# Patient Record
Sex: MALE | Race: WHITE | NOT HISPANIC OR LATINO | Employment: OTHER | URBAN - METROPOLITAN AREA
[De-identification: names, ages, dates, MRNs, and addresses within clinical notes are randomized per-mention and may not be internally consistent; named-entity substitution may affect disease eponyms.]

---

## 2017-06-14 ENCOUNTER — OFFICE VISIT (OUTPATIENT)
Dept: URGENT CARE | Facility: PHYSICIAN GROUP | Age: 82
End: 2017-06-14
Payer: MEDICARE

## 2017-06-14 ENCOUNTER — HOSPITAL ENCOUNTER (OUTPATIENT)
Facility: MEDICAL CENTER | Age: 82
End: 2017-06-14
Attending: PHYSICIAN ASSISTANT
Payer: MEDICARE

## 2017-06-14 VITALS
SYSTOLIC BLOOD PRESSURE: 140 MMHG | DIASTOLIC BLOOD PRESSURE: 72 MMHG | TEMPERATURE: 98.4 F | OXYGEN SATURATION: 99 % | HEART RATE: 60 BPM

## 2017-06-14 DIAGNOSIS — R30.0 DYSURIA: ICD-10-CM

## 2017-06-14 LAB
APPEARANCE UR: CLEAR
BILIRUB UR STRIP-MCNC: NORMAL MG/DL
COLOR UR AUTO: YELLOW
GLUCOSE UR STRIP.AUTO-MCNC: NORMAL MG/DL
KETONES UR STRIP.AUTO-MCNC: NORMAL MG/DL
LEUKOCYTE ESTERASE UR QL STRIP.AUTO: NORMAL
NITRITE UR QL STRIP.AUTO: NORMAL
PH UR STRIP.AUTO: 5 [PH] (ref 5–8)
PROT UR QL STRIP: NORMAL MG/DL
RBC UR QL AUTO: NORMAL
SP GR UR STRIP.AUTO: 1.01
UROBILINOGEN UR STRIP-MCNC: NORMAL MG/DL

## 2017-06-14 PROCEDURE — 87086 URINE CULTURE/COLONY COUNT: CPT

## 2017-06-14 PROCEDURE — 81002 URINALYSIS NONAUTO W/O SCOPE: CPT | Performed by: PHYSICIAN ASSISTANT

## 2017-06-14 PROCEDURE — 99214 OFFICE O/P EST MOD 30 MIN: CPT | Performed by: PHYSICIAN ASSISTANT

## 2017-06-14 ASSESSMENT — ENCOUNTER SYMPTOMS
NAUSEA: 0
SWEATS: 0
CHILLS: 0
FLANK PAIN: 0

## 2017-06-14 NOTE — MR AVS SNAPSHOT
Shawanda Daley JrSheng   2017 9:45 AM   Office Visit   MRN: [id number]    Department:  [facility]   Dept Phone:  [phone]    Description:  Male : 1935   Provider:  Gilda Back PA-C           Reason for Visit     Other Possible UTI; x 1 month      Allergies as of 2017     No Known Allergies      You were diagnosed with     Dysuria   [788.1.ICD-9-CM]  -  Primary     Urinary frequency   [788.41.ICD-9-CM]         Vital Signs     Blood Pressure Pulse Temperature Oxygen Saturation Smoking Status       140/72 mmHg 60 36.9 °C (98.4 °F) 99% Never Smoker        Basic Information     Date Of Birth Sex Race Ethnicity Preferred Language    1935 Male White Non- English      Problem List              ICD-10-CM Priority Class Noted - Resolved    BPH (benign prostatic hyperplasia) N40.0   3/4/2016 - Present    History of kidney stones Z87.442   3/4/2016 - Present    IGT (impaired glucose tolerance) R73.02   9/15/2016 - Present    Lump or mass in breast N63   2016 - Present      Health Maintenance        Date Due Completion Dates    IMM DTaP/Tdap/Td Vaccine (1 - Tdap) 3/24/1955 ---    COLONOSCOPY 2023 (Done)    Override on 2013: Done            Results     POCT Urinalysis                   Current Immunizations     13-VALENT PCV PREVNAR 2014    Influenza Vaccine Adult HD 2015    Pneumococcal polysaccharide vaccine (PPSV-23) 9/15/2016    SHINGLES VACCINE 2011      Below and/or attached are the medications your provider expects you to take. Review all of your home medications and newly ordered medications with your provider and/or pharmacist. Follow medication instructions as directed by your provider and/or pharmacist. Please keep your medication list with you and share with your provider. Update the information when medications are discontinued, doses are changed, or new medications (including over-the-counter products) are added; and carry  medication information at all times in the event of emergency situations     Allergies:  No Known Allergies          Medications  Valid as of: June 14, 2017 - 10:36 AM    Generic Name Brand Name Tablet Size Instructions for use    .                 Medicines prescribed today were sent to:     [id number] - [place], [place] - [address]    [address] [place] [address]    Phone: [phone] Fax: [phone]    Open 24 Hours?: No      Medication refill instructions:       If your prescription bottle indicates you have medication refills left, it is not necessary to call your provider’s office. Please contact your pharmacy and they will refill your medication.    If your prescription bottle indicates you do not have any refills left, you may request refills at any time through one of the following ways: The online [employer] system (except Urgent Care), by calling your provider’s office, or by asking your pharmacy to contact your provider’s office with a refill request. Medication refills are processed only during regular business hours and may not be available until the next business day. Your provider may request additional information or to have a follow-up visit with you prior to refilling your medication.   *Please Note: Medication refills are assigned a new Rx number when refilled electronically. Your pharmacy may indicate that no refills were authorized even though a new prescription for the same medication is available at the pharmacy. Please request the medicine by name with the pharmacy before contacting your provider for a refill.        Your To Do List     Future Labs/Procedures Complete By Expires    Urine Culture  As directed 6/14/2018         [employer] Access Code: Activation code not generated  Current [employer] Status: Active

## 2017-06-14 NOTE — PROGRESS NOTES
Subjective:      Shawanda Daley Jr. is a 82 y.o. male who presents with Other    PMH:  has a past medical history of Renal disorder (2015) and Cataract. He also has no past medical history of Arthritis.  MEDS: No current outpatient prescriptions on file.  ALLERGIES: No Known Allergies  SURGHX:   Past Surgical History   Procedure Laterality Date   • Other  2000     anal cyst   • Inguinal hernia repair robotic Bilateral 8/10/2016     Procedure: INGUINAL HERNIA REPAIR ROBOTIC with mesh  ;  Surgeon: Francis Fletcher M.D.;  Location: SURGERY [facility];  Service:    • Other abdominal surgery  9/10/2016     hernia with mesh   • Breast biopsy Left 11/18/2016     Procedure: BREAST BIOPSY;  Surgeon: Adrianne Dobbins M.D.;  Location: SURGERY [facility];  Service:      SOCHX:  reports that he has never smoked. He has never used smokeless tobacco. He reports that he drinks alcohol. He reports that he does not use illicit drugs.  FH: family history includes Cancer in his maternal uncle; Heart Disease in his father; Other in his mother.          HPI Comments: Patient presents with:    PT states he has noticed occasional burning on initiation of urination when he gets up in the middle of the night to urinate, but otherwise no UTI symptoms.  PT states he noticed this first about a month ago, but then resolved. Has occurred more frequently in the past 1-2 weeks, but not with most urination.     PT denies fever, chills, back pain , abdominal pain, testicular pain, rash or discharge.       Dysuria   This is a new problem. The current episode started 1 to 4 weeks ago. The problem occurs intermittently. The problem has been waxing and waning. The quality of the pain is described as burning. The pain is mild. There has been no fever. He is sexually active. There is no history of pyelonephritis. Pertinent negatives include no chills, discharge, flank pain, frequency, hematuria, hesitancy, nausea, sweats or urgency. He has tried  nothing for the symptoms. His past medical history is significant for kidney stones. There is no history of recurrent UTIs.       Review of Systems   Constitutional: Negative for chills.   Gastrointestinal: Negative for nausea.   Genitourinary: Positive for dysuria. Negative for hesitancy, urgency, frequency, hematuria and flank pain.   All other systems reviewed and are negative.         Objective:     /72 mmHg  Pulse 60  Temp(Src) 36.9 °C (98.4 °F)  SpO2 99%     Physical Exam   Constitutional: He is oriented to person, place, and time. He appears well-developed and well-nourished. No distress.   HENT:   Head: Normocephalic and atraumatic.   Nose: Nose normal.   Mouth/Throat: Oropharynx is clear and moist.   Eyes: EOM are normal. Pupils are equal, round, and reactive to light.   Neck: Normal range of motion. Neck supple.   Cardiovascular: Normal rate, regular rhythm and normal heart sounds.    Pulmonary/Chest: Effort normal and breath sounds normal.   Abdominal: Soft. Bowel sounds are normal. There is no rebound and no guarding.   Genitourinary:   Declined exam   Musculoskeletal: Normal range of motion.   Neurological: He is alert and oriented to person, place, and time.   Skin: Skin is warm and dry.   Psychiatric: He has a normal mood and affect.   Nursing note and vitals reviewed.         UA: neg dip     Assessment/Plan:     1. Dysuria  POCT Urinalysis    Urine Culture        Urine culture sent, will wait to treat for culture results.     PT encouraged to increase fluid intake.     PT should follow up with PCP in 1-2 days for re-evaluation if symptoms have not improved.  Discussed red flags and reasons to return to UC or ED.  Pt and/or family verbalized understanding of diagnosis and follow up instructions and was given informational handout on diagnosis.  PT discharged.

## 2017-06-15 DIAGNOSIS — R30.0 DYSURIA: ICD-10-CM

## 2017-06-17 LAB
BACTERIA UR CULT: NORMAL
SIGNIFICANT IND 70042: NORMAL
SOURCE SOURCE: NORMAL

## 2018-02-20 ENCOUNTER — HOSPITAL ENCOUNTER (OUTPATIENT)
Dept: LAB | Facility: MEDICAL CENTER | Age: 83
End: 2018-02-20
Attending: UROLOGY
Payer: MEDICARE

## 2018-02-20 LAB — PSA SERPL-MCNC: 4.45 NG/ML (ref 0–4)

## 2018-02-20 PROCEDURE — 36415 COLL VENOUS BLD VENIPUNCTURE: CPT

## 2018-02-20 PROCEDURE — 84153 ASSAY OF PSA TOTAL: CPT

## 2018-03-01 ENCOUNTER — TELEPHONE (OUTPATIENT)
Dept: MEDICAL GROUP | Facility: MEDICAL CENTER | Age: 83
End: 2018-03-01

## 2018-03-01 NOTE — TELEPHONE ENCOUNTER
Future Appointments       Provider Department Center    3/2/2018 1:40 PM Luis Serrano M.D.; [place] [address] [address] [facility] ANNUAL WELLNESS VISIT PRE-VISIT PLANNING WITHOUT OUTREACH     1.  Reviewed note from last office visit with PCP: YES Last office visit 09/15/16    2.  If any orders were placed at last visit, do we have Results/Consult Notes?        •  Labs - Labs ordered, completed on 02/20/18 and results are in chart.       •  Imaging - Imaging ordered, completed and results are in chart. 11/03/16  BREAST/MAMMO       •  Referrals - No referrals were ordered at last office visit.     3.  Immunizations were updated in [place] using WebIZ?: Yes       •  WebIZ Recommendations: TDAP        •  Is patient due for Tdap? YES. Patient was not notified of copay/out of pocket cost.       •  Is patient due for Shingles? NO     4.  Patient has the following Care Path diagnoses on Problem List:  NONE    5.  MDX printed and highlighted for Provider? YES    6.  Patient is due for the following Health Maintenance Topics:     Health Maintenance Due   Topic Date Due   • IMM DTaP/Tdap/Td Vaccine (1 - Tdap) 03/24/1955   • IMM INFLUENZA (1) 09/01/2017   • Annual Wellness Visit  09/16/2017

## 2018-03-02 ENCOUNTER — OFFICE VISIT (OUTPATIENT)
Dept: MEDICAL GROUP | Facility: MEDICAL CENTER | Age: 83
End: 2018-03-02
Payer: MEDICARE

## 2018-03-02 VITALS
TEMPERATURE: 98.2 F | BODY MASS INDEX: 24.22 KG/M2 | HEIGHT: 71 IN | RESPIRATION RATE: 16 BRPM | HEART RATE: 53 BPM | OXYGEN SATURATION: 97 % | SYSTOLIC BLOOD PRESSURE: 130 MMHG | DIASTOLIC BLOOD PRESSURE: 62 MMHG | WEIGHT: 173 LBS

## 2018-03-02 DIAGNOSIS — R73.02 IGT (IMPAIRED GLUCOSE TOLERANCE): ICD-10-CM

## 2018-03-02 DIAGNOSIS — Z00.00 MEDICARE ANNUAL WELLNESS VISIT, SUBSEQUENT: ICD-10-CM

## 2018-03-02 DIAGNOSIS — N40.0 BENIGN PROSTATIC HYPERPLASIA, UNSPECIFIED WHETHER LOWER URINARY TRACT SYMPTOMS PRESENT: ICD-10-CM

## 2018-03-02 PROCEDURE — G0439 PPPS, SUBSEQ VISIT: HCPCS | Performed by: INTERNAL MEDICINE

## 2018-03-02 RX ORDER — TAMSULOSIN HYDROCHLORIDE 0.4 MG/1
CAPSULE ORAL
COMMUNITY
Start: 2018-02-05 | End: 2019-01-01

## 2018-03-02 ASSESSMENT — PATIENT HEALTH QUESTIONNAIRE - PHQ9: CLINICAL INTERPRETATION OF PHQ2 SCORE: 0

## 2018-03-02 ASSESSMENT — ACTIVITIES OF DAILY LIVING (ADL): BATHING_REQUIRES_ASSISTANCE: 0

## 2018-03-02 NOTE — PROGRESS NOTES
CC: Wellness examination    HPI:   Shawanda presents today with the following.  Elaina describes their overall health as good.    1. Medicare annual wellness visit, subsequent  Discussed healthy lifestyle habits as well as screening regimens. Information given on advance directives      Information for advance directives given to patient or instructed to bring in advance directives into to office to put in chart.      Depression Screening    Little interest or pleasure in doing things?  0 - not at all  Feeling down, depressed , or hopeless? 0 - not at all  Patient Health Questionnaire Score: 0     If depressive symptoms identified deferred to follow up visit unless specifically addressed in assessment and plan.    Interpretation of PHQ-9 Total Score   Score Severity   1-4 No Depression   5-9 Mild Depression   10-14 Moderate Depression   15-19 Moderately Severe Depression   20-27 Severe Depression    Screening for Cognitive Impairment    Three Minute Recall (apple, watch, slim)  3/3    Draw clock face with all 12 numbers set to the hand to show 10 minutes past 11 o'clock  1 4/5  Cognitive concerns identified deferred for follow up unless specifically addressed in assessment and plan.    Fall Risk Assessment    Has the patient had two or more falls in the last year or any fall with injury in the last year?  No    Safety Assessment    Throw rugs on floor.  Yes  Handrails on all stairs.  Yes  Good lighting in all hallways.  Yes  Difficulty hearing.  Yes  Patient counseled about all safety risks that were identified.    Functional Assessment ADLs    Are there any barriers preventing you from cooking for yourself or meeting nutritional needs?  No.    Are there any barriers preventing you from driving safely or obtaining transportation?  No.    Are there any barriers preventing you from using a telephone or calling for help?  No.    Are there any barriers preventing you from shopping?  No.    Are there any barriers preventing  you from taking care of your own finances?  No.    Are there any barriers preventing you from managing your medications?  No.    Are there any barriers preventing you from showering/bathing yourself?  No.    Are currently engaging any exercise or physical activity?  Yes.       Health Maintenance Summary                IMM DTaP/Tdap/Td Vaccine Postponed 3/19/2025 Originally [birth year]24/1954. Insurance/Financial    Annual Wellness Visit Next Due 3/3/2019      Done 3/2/2018 Visit Dx: Medicare annual wellness visit, subsequent     Patient has more history with this topic...    COLONOSCOPY Next Due 5/17/2023      Done 5/17/2013           Patient Care Team:  Lius Serrano M.D. as PCP - General (Internal Medicine)  Luis Mckeon M.D. as Consulting Physician (Urology)  Ryder Flynn M.D. as Consulting Physician (Ophthalmology)            Health Care Screening: Age-appropriate preventive services that Medicare covers were discussed today and ordered as indicated and agreed upon by the patient, and as recommended by USPTF and ACIP.     Patient Active Problem List    Diagnosis Date Noted   • IGT (impaired glucose tolerance) 09/15/2016   • BPH (benign prostatic hyperplasia) 03/04/2016   • History of kidney stones 03/04/2016       Current Outpatient Prescriptions   Medication Sig Dispense Refill   • tamsulosin (FLOMAX) 0.4 MG capsule        No current facility-administered medications for this visit.        Family History   Problem Relation Age of Onset   • Other Mother      old age   • Heart Disease Father    • Cancer Maternal Uncle      prostate       Social History     Social History   • Marital status:      Spouse name: N/A   • Number of children: N/A   • Years of education: N/A     Occupational History   • Not on file.     Social History Main Topics   • Smoking status: Never Smoker   • Smokeless tobacco: Never Used   • Alcohol use 0.0 oz/week      Comment: 1-2 glasses of wine per month   • Drug use: No   • Sexual  "activity: No     Other Topics Concern   • Not on file     Social History Narrative   • No narrative on file       Past Surgical History:   Procedure Laterality Date   • BREAST BIOPSY Left 11/18/2016    Procedure: BREAST BIOPSY;  Surgeon: Adrianne Dobbins M.D.;  Location: SURGERY [facility];  Service:    • OTHER ABDOMINAL SURGERY  9/10/2016    hernia with mesh   • INGUINAL HERNIA REPAIR ROBOTIC Bilateral 8/10/2016    Procedure: INGUINAL HERNIA REPAIR ROBOTIC with mesh  ;  Surgeon: Francis Fletcher M.D.;  Location: SURGERY [facility];  Service:    • OTHER  2000    anal cyst       Allergies as of 03/02/2018   • (No Known Allergies)        ROS: Denies Chest pain, SOB, LE edema.    /62   Pulse (!) 53   Temp 36.8 °C (98.2 °F)   Resp 16   Ht 1.791 m (5' 10.5\")   Wt 78.5 kg (173 lb)   SpO2 97%   BMI 24.47 kg/m²      Physical Exam:  Gen:         Alert and oriented, No apparent distress.  Neck:        No Lymphadenopathy or Bruits.  Lungs:     Clear to auscultation bilaterally  CV:          Regular rate and rhythm. No murmurs, rubs or gallops.  Abd:         Soft non tender, non distended. Normal active bowel sounds.  No  Hepatosplenomegaly, No pulsatile masses.                   Ext:          No clubbing, cyanosis, edema.      Assessment and Plan.   82 y.o. male with the following issues.    1. Medicare annual wellness visit, subsequent  Discussed healthy lifestyle habits as well as screening regimens.    - Annual Wellness Visit - Includes PPPS Subsequent ()    2. IGT (impaired glucose tolerance)  Discussion on the importance of more routine follow-up have sent for blood work follow-up for abnormalities. Will see back in 6 months to continue monitoring    - Annual Wellness Visit - Includes PPPS Subsequent ()  - COMP METABOLIC PANEL; Future  - LIPID PROFILE; Future  - HEMOGLOBIN A1C; Future    3. Benign prostatic hyperplasia, unspecified whether lower urinary tract symptoms present  Stable " followed by urology no change to medications.  - tamsulosin (FLOMAX) 0.4 MG capsule;   - Annual Wellness Visit - Includes PPPS Subsequent ()        Referrals offered: Community-based lifestyle interventions to reduce health risks and promote self-management and wellness, fall prevention, nutrition, physical activity, tobacco-use cessation, weight loss, and mental health services as per orders if indicated.    Discussion today about general wellness and lifestyle habits:    · Prevent falls and reduce trip hazards; Cautioned about securing or removing rugs.  · Have a working fire alarm and carbon monoxide detector;   · Engage in regular physical activity and social activities

## 2018-03-13 ENCOUNTER — HOSPITAL ENCOUNTER (OUTPATIENT)
Dept: LAB | Facility: MEDICAL CENTER | Age: 83
End: 2018-03-13
Attending: INTERNAL MEDICINE
Payer: MEDICARE

## 2018-03-13 DIAGNOSIS — R73.02 IGT (IMPAIRED GLUCOSE TOLERANCE): ICD-10-CM

## 2018-03-13 LAB
ALBUMIN SERPL BCP-MCNC: 4 G/DL (ref 3.2–4.9)
ALBUMIN/GLOB SERPL: 1.9 G/DL
ALP SERPL-CCNC: 51 U/L (ref 30–99)
ALT SERPL-CCNC: 10 U/L (ref 2–50)
ANION GAP SERPL CALC-SCNC: 6 MMOL/L (ref 0–11.9)
AST SERPL-CCNC: 11 U/L (ref 12–45)
BILIRUB SERPL-MCNC: 0.7 MG/DL (ref 0.1–1.5)
BUN SERPL-MCNC: 22 MG/DL (ref 8–22)
CALCIUM SERPL-MCNC: 8.9 MG/DL (ref 8.5–10.5)
CHLORIDE SERPL-SCNC: 109 MMOL/L (ref 96–112)
CHOLEST SERPL-MCNC: 131 MG/DL (ref 100–199)
CO2 SERPL-SCNC: 26 MMOL/L (ref 20–33)
CREAT SERPL-MCNC: 1.18 MG/DL (ref 0.5–1.4)
EST. AVERAGE GLUCOSE BLD GHB EST-MCNC: 117 MG/DL
GLOBULIN SER CALC-MCNC: 2.1 G/DL (ref 1.9–3.5)
GLUCOSE SERPL-MCNC: 107 MG/DL (ref 65–99)
HBA1C MFR BLD: 5.7 % (ref 0–5.6)
HDLC SERPL-MCNC: 37 MG/DL
LDLC SERPL CALC-MCNC: 76 MG/DL
POTASSIUM SERPL-SCNC: 4.4 MMOL/L (ref 3.6–5.5)
PROT SERPL-MCNC: 6.1 G/DL (ref 6–8.2)
SODIUM SERPL-SCNC: 141 MMOL/L (ref 135–145)
TRIGL SERPL-MCNC: 89 MG/DL (ref 0–149)

## 2018-03-13 PROCEDURE — 36415 COLL VENOUS BLD VENIPUNCTURE: CPT

## 2018-03-13 PROCEDURE — 80053 COMPREHEN METABOLIC PANEL: CPT

## 2018-03-13 PROCEDURE — 83036 HEMOGLOBIN GLYCOSYLATED A1C: CPT

## 2018-03-13 PROCEDURE — 80061 LIPID PANEL: CPT

## 2018-09-05 ENCOUNTER — OFFICE VISIT (OUTPATIENT)
Dept: MEDICAL GROUP | Facility: MEDICAL CENTER | Age: 83
End: 2018-09-05
Payer: MEDICARE

## 2018-09-05 VITALS
BODY MASS INDEX: 22.4 KG/M2 | TEMPERATURE: 97.8 F | HEIGHT: 71 IN | HEART RATE: 58 BPM | RESPIRATION RATE: 16 BRPM | OXYGEN SATURATION: 97 % | WEIGHT: 160 LBS | SYSTOLIC BLOOD PRESSURE: 126 MMHG | DIASTOLIC BLOOD PRESSURE: 62 MMHG

## 2018-09-05 DIAGNOSIS — R53.83 OTHER FATIGUE: ICD-10-CM

## 2018-09-05 DIAGNOSIS — Z11.3 SCREEN FOR STD (SEXUALLY TRANSMITTED DISEASE): ICD-10-CM

## 2018-09-05 DIAGNOSIS — R15.9 INCONTINENCE OF FECES, UNSPECIFIED FECAL INCONTINENCE TYPE: ICD-10-CM

## 2018-09-05 DIAGNOSIS — G62.9 NEUROPATHY: ICD-10-CM

## 2018-09-05 DIAGNOSIS — Z13.220 SCREENING, LIPID: ICD-10-CM

## 2018-09-05 DIAGNOSIS — R73.02 IGT (IMPAIRED GLUCOSE TOLERANCE): ICD-10-CM

## 2018-09-05 LAB
HBA1C MFR BLD: 5.2 % (ref ?–5.8)
INT CON NEG: NORMAL
INT CON POS: NORMAL

## 2018-09-05 PROCEDURE — 99214 OFFICE O/P EST MOD 30 MIN: CPT | Performed by: INTERNAL MEDICINE

## 2018-09-05 PROCEDURE — 83036 HEMOGLOBIN GLYCOSYLATED A1C: CPT | Performed by: INTERNAL MEDICINE

## 2018-09-05 NOTE — PROGRESS NOTES
"CC: Follow-up blood sugars complaining of foot numbness and rectal incontinence.    HPI:   Shawanda presents today with the following.    1. IGT (impaired glucose tolerance)  Presents with a slightly elevated blood sugar in the past A1c of 5.7 recheck today under 5.5.  No true history of diabetes.    2. Neuropathy (HCC)  In complaint today is some discomfort in his feet mostly at night.  Does not have any pain with ambulation he notices it more when he is asleep at night.  Does not prevent him from sleep laying and pain is not severe.  Denies any radiation and no back pain or other pain in his legs.  He is noted to cross his feet multiple ×1 the visit.    3. Incontinence of feces, unspecified fecal incontinence type  Is complaining of some rectal incontinence.  He states he had some kind of surgery several years ago and since that time he has had some issues.  Symptoms are getting worse with time.  He denies any pain but reports if he does not get to the restroom immediately he will have an accident.  Blood in stool or dark tarry stool.      Patient Active Problem List    Diagnosis Date Noted   • IGT (impaired glucose tolerance) 09/15/2016   • BPH (benign prostatic hyperplasia) 03/04/2016   • History of kidney stones 03/04/2016       Current Outpatient Prescriptions   Medication Sig Dispense Refill   • tamsulosin (FLOMAX) 0.4 MG capsule        No current facility-administered medications for this visit.          Allergies as of 09/05/2018   • (No Known Allergies)        ROS: Denies Chest pain, SOB, LE edema.    /62   Pulse (!) 58   Temp 36.6 °C (97.8 °F)   Resp 16   Ht 1.791 m (5' 10.5\")   Wt 72.6 kg (160 lb)   SpO2 97%   BMI 22.63 kg/m²     Physical Exam:  Gen:         Alert and oriented, No apparent distress.  Neck:        No Lymphadenopathy or Bruits.  Lungs:     Clear to auscultation bilaterally  CV:          Regular rate and rhythm. No murmurs, rubs or gallops.               Ext:          No clubbing, " cyanosis, edema.      Assessment and Plan.   83 y.o. male with the following issues.    1. IGT (impaired glucose tolerance)  Currently doing well no change to therapy.  - POCT  A1C  - HEMOGLOBIN A1C; Future    2. Neuropathy (HCC)  Descriptive of neuropathy however sensation is completely intact.  Have sent for blood work to evaluate.  The only other symptom he may relate is some mild fatigue  - VITAMIN B12; Future  - FOLATE; Future  - TSH; Future  - REFERRAL TO GASTROENTEROLOGY  - CBC WITH DIFFERENTIAL; Future    3. Incontinence of feces, unspecified fecal incontinence type  Given the persistence of symptoms likely related to prior surgeries have sent over to GI for evaluation.

## 2018-09-11 ENCOUNTER — HOSPITAL ENCOUNTER (OUTPATIENT)
Dept: LAB | Facility: MEDICAL CENTER | Age: 83
End: 2018-09-11
Attending: INTERNAL MEDICINE
Payer: MEDICARE

## 2018-09-11 DIAGNOSIS — Z13.220 SCREENING, LIPID: ICD-10-CM

## 2018-09-11 DIAGNOSIS — G62.9 NEUROPATHY: ICD-10-CM

## 2018-09-11 DIAGNOSIS — R53.83 OTHER FATIGUE: ICD-10-CM

## 2018-09-11 LAB
ALBUMIN SERPL BCP-MCNC: 3.7 G/DL (ref 3.2–4.9)
ALBUMIN/GLOB SERPL: 1.5 G/DL
ALP SERPL-CCNC: 47 U/L (ref 30–99)
ALT SERPL-CCNC: 10 U/L (ref 2–50)
ANION GAP SERPL CALC-SCNC: 7 MMOL/L (ref 0–11.9)
AST SERPL-CCNC: 12 U/L (ref 12–45)
BASOPHILS # BLD AUTO: 0.3 % (ref 0–1.8)
BASOPHILS # BLD: 0.02 K/UL (ref 0–0.12)
BILIRUB SERPL-MCNC: 0.5 MG/DL (ref 0.1–1.5)
BUN SERPL-MCNC: 30 MG/DL (ref 8–22)
CALCIUM SERPL-MCNC: 9.6 MG/DL (ref 8.5–10.5)
CHLORIDE SERPL-SCNC: 109 MMOL/L (ref 96–112)
CHOLEST SERPL-MCNC: 127 MG/DL (ref 100–199)
CO2 SERPL-SCNC: 26 MMOL/L (ref 20–33)
CREAT SERPL-MCNC: 1.29 MG/DL (ref 0.5–1.4)
EOSINOPHIL # BLD AUTO: 0.09 K/UL (ref 0–0.51)
EOSINOPHIL NFR BLD: 1.4 % (ref 0–6.9)
ERYTHROCYTE [DISTWIDTH] IN BLOOD BY AUTOMATED COUNT: 43.7 FL (ref 35.9–50)
FASTING STATUS PATIENT QL REPORTED: NORMAL
FOLATE SERPL-MCNC: 14.9 NG/ML
GLOBULIN SER CALC-MCNC: 2.4 G/DL (ref 1.9–3.5)
GLUCOSE SERPL-MCNC: 89 MG/DL (ref 65–99)
HCT VFR BLD AUTO: 41 % (ref 42–52)
HDLC SERPL-MCNC: 35 MG/DL
HGB BLD-MCNC: 13.4 G/DL (ref 14–18)
IMM GRANULOCYTES # BLD AUTO: 0.02 K/UL (ref 0–0.11)
IMM GRANULOCYTES NFR BLD AUTO: 0.3 % (ref 0–0.9)
LDLC SERPL CALC-MCNC: 75 MG/DL
LYMPHOCYTES # BLD AUTO: 2.16 K/UL (ref 1–4.8)
LYMPHOCYTES NFR BLD: 32.7 % (ref 22–41)
MCH RBC QN AUTO: 29.7 PG (ref 27–33)
MCHC RBC AUTO-ENTMCNC: 32.7 G/DL (ref 33.7–35.3)
MCV RBC AUTO: 90.9 FL (ref 81.4–97.8)
MONOCYTES # BLD AUTO: 0.51 K/UL (ref 0–0.85)
MONOCYTES NFR BLD AUTO: 7.7 % (ref 0–13.4)
NEUTROPHILS # BLD AUTO: 3.81 K/UL (ref 1.82–7.42)
NEUTROPHILS NFR BLD: 57.6 % (ref 44–72)
NRBC # BLD AUTO: 0 K/UL
NRBC BLD-RTO: 0 /100 WBC
PLATELET # BLD AUTO: 297 K/UL (ref 164–446)
PMV BLD AUTO: 9.3 FL (ref 9–12.9)
POTASSIUM SERPL-SCNC: 4.2 MMOL/L (ref 3.6–5.5)
PROT SERPL-MCNC: 6.1 G/DL (ref 6–8.2)
RBC # BLD AUTO: 4.51 M/UL (ref 4.7–6.1)
SODIUM SERPL-SCNC: 142 MMOL/L (ref 135–145)
TREPONEMA PALLIDUM IGG+IGM AB [PRESENCE] IN SERUM OR PLASMA BY IMMUNOASSAY: NON REACTIVE
TRIGL SERPL-MCNC: 86 MG/DL (ref 0–149)
TSH SERPL DL<=0.005 MIU/L-ACNC: 2.77 UIU/ML (ref 0.38–5.33)
VIT B12 SERPL-MCNC: 103 PG/ML (ref 211–911)
WBC # BLD AUTO: 6.6 K/UL (ref 4.8–10.8)

## 2018-09-11 PROCEDURE — 36415 COLL VENOUS BLD VENIPUNCTURE: CPT

## 2018-09-11 PROCEDURE — 84443 ASSAY THYROID STIM HORMONE: CPT

## 2018-09-11 PROCEDURE — 85025 COMPLETE CBC W/AUTO DIFF WBC: CPT

## 2018-09-11 PROCEDURE — 83036 HEMOGLOBIN GLYCOSYLATED A1C: CPT

## 2018-09-11 PROCEDURE — 80053 COMPREHEN METABOLIC PANEL: CPT

## 2018-09-11 PROCEDURE — 80061 LIPID PANEL: CPT

## 2018-09-11 PROCEDURE — 86780 TREPONEMA PALLIDUM: CPT

## 2018-09-11 PROCEDURE — 82607 VITAMIN B-12: CPT

## 2018-09-11 PROCEDURE — 82746 ASSAY OF FOLIC ACID SERUM: CPT

## 2018-09-12 LAB
EST. AVERAGE GLUCOSE BLD GHB EST-MCNC: 114 MG/DL
HBA1C MFR BLD: 5.6 % (ref 0–5.6)

## 2018-12-27 ENCOUNTER — PATIENT OUTREACH (OUTPATIENT)
Dept: HEALTH INFORMATION MANAGEMENT | Facility: OTHER | Age: 83
End: 2018-12-27

## 2018-12-27 NOTE — PROGRESS NOTES
1. Attempt #:1    2. HealthConnect Verified: yes    3. Verify PCP: yes    4. Review Care Team: yes    5. WebIZ Checked & Epic Updated: Yes  · WebIZ Recommendations: FLU  · Is patient due for Tdap? NO  · Is patient due for Shingles? NO    6. Reviewed/Updated the following with patient:       •   Communication Preference Obtained? YES       •   Preferred Pharmacy? YES       •   Preferred Lab? YES       •   Family History (document living status of immediate family members and if + hx of cancer, diabetes, hypertension, hyperlipidemia, heart attack, stroke) YES    7. Annual Wellness Visit Scheduling  · Scheduling Status:Scheduled     8. Care Gap Scheduling (Attempt to Schedule EACH Overdue Care Gap!)     Health Maintenance Due   Topic Date Due   • IMM INFLUENZA (1) 09/01/2018        Scheduled patient for Annual Wellness Visit     9. [employer] Activation: already active    10. [employer] Bee: no    11. Virtual Visits: no    12. Opt In to Text Messages: no    13. Patient was advised: “This is a free wellness visit. The provider will screen for medical conditions to help you stay healthy. If you have other concerns to address you may be asked to discuss these at a separate visit or there may be an additional fee.”     14. Patient was informed to arrive 15 min prior to their scheduled appointment and bring in their medication bottles.

## 2019-01-01 ENCOUNTER — NON-PROVIDER VISIT (OUTPATIENT)
Dept: MEDICAL GROUP | Facility: PHYSICIAN GROUP | Age: 84
End: 2019-01-01
Payer: MEDICARE

## 2019-01-01 ENCOUNTER — HOSPITAL ENCOUNTER (OUTPATIENT)
Dept: RADIOLOGY | Facility: MEDICAL CENTER | Age: 84
End: 2019-10-23
Attending: INTERNAL MEDICINE
Payer: MEDICARE

## 2019-01-01 ENCOUNTER — TELEPHONE (OUTPATIENT)
Dept: CARDIOLOGY | Facility: MEDICAL CENTER | Age: 84
End: 2019-01-01

## 2019-01-01 ENCOUNTER — OFFICE VISIT (OUTPATIENT)
Dept: MEDICAL GROUP | Facility: PHYSICIAN GROUP | Age: 84
End: 2019-01-01
Payer: MEDICARE

## 2019-01-01 ENCOUNTER — PATIENT OUTREACH (OUTPATIENT)
Dept: HEALTH INFORMATION MANAGEMENT | Facility: OTHER | Age: 84
End: 2019-01-01

## 2019-01-01 ENCOUNTER — HOSPITAL ENCOUNTER (OUTPATIENT)
Dept: RADIOLOGY | Facility: MEDICAL CENTER | Age: 84
End: 2019-07-16
Attending: INTERNAL MEDICINE
Payer: MEDICARE

## 2019-01-01 ENCOUNTER — HOSPITAL ENCOUNTER (OUTPATIENT)
Dept: LAB | Facility: MEDICAL CENTER | Age: 84
End: 2019-09-06
Attending: INTERNAL MEDICINE
Payer: MEDICARE

## 2019-01-01 ENCOUNTER — HOSPITAL ENCOUNTER (OUTPATIENT)
Dept: RADIOLOGY | Facility: MEDICAL CENTER | Age: 84
End: 2019-10-30
Attending: PHYSICIAN ASSISTANT
Payer: MEDICARE

## 2019-01-01 ENCOUNTER — HOSPITAL ENCOUNTER (OUTPATIENT)
Dept: LAB | Facility: MEDICAL CENTER | Age: 84
End: 2019-12-20
Attending: UROLOGY
Payer: MEDICARE

## 2019-01-01 ENCOUNTER — APPOINTMENT (OUTPATIENT)
Dept: RADIOLOGY | Facility: MEDICAL CENTER | Age: 84
End: 2019-01-01
Attending: INTERNAL MEDICINE
Payer: MEDICARE

## 2019-01-01 ENCOUNTER — HOSPITAL ENCOUNTER (OUTPATIENT)
Dept: RADIOLOGY | Facility: MEDICAL CENTER | Age: 84
End: 2019-12-31
Attending: FAMILY MEDICINE
Payer: MEDICARE

## 2019-01-01 ENCOUNTER — TELEPHONE (OUTPATIENT)
Dept: MEDICAL GROUP | Facility: PHYSICIAN GROUP | Age: 84
End: 2019-01-01

## 2019-01-01 ENCOUNTER — HOSPITAL ENCOUNTER (OUTPATIENT)
Dept: LAB | Facility: MEDICAL CENTER | Age: 84
End: 2019-12-13
Payer: MEDICARE

## 2019-01-01 ENCOUNTER — ANESTHESIA (OUTPATIENT)
Dept: SURGERY | Facility: MEDICAL CENTER | Age: 84
End: 2019-01-01
Payer: MEDICARE

## 2019-01-01 ENCOUNTER — OFFICE VISIT (OUTPATIENT)
Dept: NEUROLOGY | Facility: MEDICAL CENTER | Age: 84
End: 2019-01-01
Payer: MEDICARE

## 2019-01-01 ENCOUNTER — PATIENT MESSAGE (OUTPATIENT)
Dept: MEDICAL GROUP | Facility: PHYSICIAN GROUP | Age: 84
End: 2019-01-01

## 2019-01-01 ENCOUNTER — APPOINTMENT (OUTPATIENT)
Dept: RADIOLOGY | Facility: MEDICAL CENTER | Age: 84
End: 2019-01-01
Attending: EMERGENCY MEDICINE
Payer: MEDICARE

## 2019-01-01 ENCOUNTER — HOSPITAL ENCOUNTER (OUTPATIENT)
Facility: MEDICAL CENTER | Age: 84
End: 2019-12-28
Attending: EMERGENCY MEDICINE | Admitting: INTERNAL MEDICINE
Payer: MEDICARE

## 2019-01-01 ENCOUNTER — ANESTHESIA EVENT (OUTPATIENT)
Dept: SURGERY | Facility: MEDICAL CENTER | Age: 84
End: 2019-01-01
Payer: MEDICARE

## 2019-01-01 ENCOUNTER — OFFICE VISIT (OUTPATIENT)
Dept: CARDIOLOGY | Facility: MEDICAL CENTER | Age: 84
End: 2019-01-01
Payer: MEDICARE

## 2019-01-01 ENCOUNTER — HOSPITAL ENCOUNTER (EMERGENCY)
Facility: MEDICAL CENTER | Age: 84
End: 2019-08-08
Attending: EMERGENCY MEDICINE
Payer: MEDICARE

## 2019-01-01 ENCOUNTER — HOSPITAL ENCOUNTER (OUTPATIENT)
Dept: RADIOLOGY | Facility: MEDICAL CENTER | Age: 84
End: 2019-07-15
Attending: INTERNAL MEDICINE
Payer: MEDICARE

## 2019-01-01 ENCOUNTER — HOSPITAL ENCOUNTER (OUTPATIENT)
Facility: MEDICAL CENTER | Age: 84
End: 2019-10-24
Attending: INTERNAL MEDICINE | Admitting: INTERNAL MEDICINE
Payer: MEDICARE

## 2019-01-01 ENCOUNTER — HOSPITAL ENCOUNTER (OUTPATIENT)
Dept: LAB | Facility: MEDICAL CENTER | Age: 84
End: 2019-12-31
Attending: FAMILY MEDICINE
Payer: MEDICARE

## 2019-01-01 ENCOUNTER — HOSPITAL ENCOUNTER (OUTPATIENT)
Dept: CARDIOLOGY | Facility: MEDICAL CENTER | Age: 84
End: 2019-07-05
Attending: INTERNAL MEDICINE
Payer: MEDICARE

## 2019-01-01 ENCOUNTER — NON-PROVIDER VISIT (OUTPATIENT)
Dept: NEUROLOGY | Facility: MEDICAL CENTER | Age: 84
End: 2019-01-01
Payer: MEDICARE

## 2019-01-01 ENCOUNTER — HOSPITAL ENCOUNTER (OUTPATIENT)
Dept: LAB | Facility: MEDICAL CENTER | Age: 84
End: 2019-11-20
Attending: INTERNAL MEDICINE
Payer: MEDICARE

## 2019-01-01 ENCOUNTER — HOSPITAL ENCOUNTER (OUTPATIENT)
Dept: RADIOLOGY | Facility: MEDICAL CENTER | Age: 84
End: 2019-12-13
Payer: MEDICARE

## 2019-01-01 VITALS
HEART RATE: 52 BPM | TEMPERATURE: 96.8 F | SYSTOLIC BLOOD PRESSURE: 122 MMHG | HEIGHT: 71 IN | RESPIRATION RATE: 14 BRPM | WEIGHT: 151 LBS | OXYGEN SATURATION: 99 % | DIASTOLIC BLOOD PRESSURE: 74 MMHG | BODY MASS INDEX: 21.14 KG/M2

## 2019-01-01 VITALS
HEART RATE: 68 BPM | OXYGEN SATURATION: 92 % | RESPIRATION RATE: 18 BRPM | BODY MASS INDEX: 22.23 KG/M2 | SYSTOLIC BLOOD PRESSURE: 105 MMHG | HEIGHT: 71 IN | WEIGHT: 158.8 LBS | TEMPERATURE: 97.6 F | DIASTOLIC BLOOD PRESSURE: 62 MMHG

## 2019-01-01 VITALS
DIASTOLIC BLOOD PRESSURE: 79 MMHG | BODY MASS INDEX: 21.71 KG/M2 | RESPIRATION RATE: 18 BRPM | SYSTOLIC BLOOD PRESSURE: 128 MMHG | TEMPERATURE: 97.5 F | HEART RATE: 69 BPM | WEIGHT: 153.44 LBS | OXYGEN SATURATION: 100 %

## 2019-01-01 VITALS
HEIGHT: 71 IN | HEART RATE: 63 BPM | RESPIRATION RATE: 16 BRPM | TEMPERATURE: 97.9 F | SYSTOLIC BLOOD PRESSURE: 180 MMHG | OXYGEN SATURATION: 100 % | BODY MASS INDEX: 23.24 KG/M2 | DIASTOLIC BLOOD PRESSURE: 90 MMHG | WEIGHT: 166 LBS

## 2019-01-01 VITALS
HEART RATE: 48 BPM | OXYGEN SATURATION: 100 % | WEIGHT: 155 LBS | BODY MASS INDEX: 21.7 KG/M2 | SYSTOLIC BLOOD PRESSURE: 166 MMHG | RESPIRATION RATE: 16 BRPM | HEIGHT: 71 IN | DIASTOLIC BLOOD PRESSURE: 80 MMHG | TEMPERATURE: 97.6 F

## 2019-01-01 VITALS
SYSTOLIC BLOOD PRESSURE: 124 MMHG | OXYGEN SATURATION: 99 % | BODY MASS INDEX: 22.31 KG/M2 | RESPIRATION RATE: 16 BRPM | HEIGHT: 71 IN | TEMPERATURE: 98.2 F | WEIGHT: 159.39 LBS | DIASTOLIC BLOOD PRESSURE: 72 MMHG | HEART RATE: 54 BPM

## 2019-01-01 VITALS
TEMPERATURE: 96.9 F | HEART RATE: 48 BPM | WEIGHT: 152.4 LBS | OXYGEN SATURATION: 98 % | DIASTOLIC BLOOD PRESSURE: 70 MMHG | SYSTOLIC BLOOD PRESSURE: 120 MMHG | BODY MASS INDEX: 21.34 KG/M2 | HEIGHT: 71 IN | RESPIRATION RATE: 14 BRPM

## 2019-01-01 VITALS
HEART RATE: 48 BPM | OXYGEN SATURATION: 98 % | BODY MASS INDEX: 22.26 KG/M2 | SYSTOLIC BLOOD PRESSURE: 142 MMHG | WEIGHT: 159 LBS | DIASTOLIC BLOOD PRESSURE: 76 MMHG | HEIGHT: 71 IN

## 2019-01-01 VITALS
BODY MASS INDEX: 23.15 KG/M2 | DIASTOLIC BLOOD PRESSURE: 69 MMHG | OXYGEN SATURATION: 96 % | WEIGHT: 165.34 LBS | HEIGHT: 71 IN | RESPIRATION RATE: 20 BRPM | SYSTOLIC BLOOD PRESSURE: 131 MMHG | TEMPERATURE: 97.7 F | HEART RATE: 67 BPM

## 2019-01-01 VITALS
BODY MASS INDEX: 23.13 KG/M2 | TEMPERATURE: 98 F | HEART RATE: 53 BPM | OXYGEN SATURATION: 100 % | SYSTOLIC BLOOD PRESSURE: 140 MMHG | RESPIRATION RATE: 16 BRPM | DIASTOLIC BLOOD PRESSURE: 62 MMHG | HEIGHT: 71 IN | WEIGHT: 165.2 LBS

## 2019-01-01 VITALS
DIASTOLIC BLOOD PRESSURE: 78 MMHG | BODY MASS INDEX: 23.32 KG/M2 | HEIGHT: 71 IN | TEMPERATURE: 96.5 F | WEIGHT: 166.6 LBS | SYSTOLIC BLOOD PRESSURE: 126 MMHG | RESPIRATION RATE: 14 BRPM

## 2019-01-01 VITALS
WEIGHT: 153 LBS | SYSTOLIC BLOOD PRESSURE: 120 MMHG | HEIGHT: 71 IN | OXYGEN SATURATION: 99 % | BODY MASS INDEX: 21.42 KG/M2 | HEART RATE: 52 BPM | DIASTOLIC BLOOD PRESSURE: 68 MMHG

## 2019-01-01 VITALS
HEIGHT: 71 IN | DIASTOLIC BLOOD PRESSURE: 60 MMHG | BODY MASS INDEX: 22.82 KG/M2 | TEMPERATURE: 97.6 F | OXYGEN SATURATION: 92 % | WEIGHT: 163 LBS | RESPIRATION RATE: 16 BRPM | HEART RATE: 58 BPM | SYSTOLIC BLOOD PRESSURE: 128 MMHG

## 2019-01-01 VITALS
TEMPERATURE: 96.3 F | WEIGHT: 170 LBS | HEIGHT: 71 IN | OXYGEN SATURATION: 91 % | SYSTOLIC BLOOD PRESSURE: 112 MMHG | BODY MASS INDEX: 23.8 KG/M2 | RESPIRATION RATE: 16 BRPM | HEART RATE: 66 BPM | DIASTOLIC BLOOD PRESSURE: 70 MMHG

## 2019-01-01 DIAGNOSIS — R53.83 FATIGUE, UNSPECIFIED TYPE: ICD-10-CM

## 2019-01-01 DIAGNOSIS — E53.8 LOW SERUM VITAMIN B12: ICD-10-CM

## 2019-01-01 DIAGNOSIS — R73.02 IGT (IMPAIRED GLUCOSE TOLERANCE): ICD-10-CM

## 2019-01-01 DIAGNOSIS — R26.81 GAIT INSTABILITY: ICD-10-CM

## 2019-01-01 DIAGNOSIS — K46.9 ABDOMINAL HERNIA WITHOUT OBSTRUCTION AND WITHOUT GANGRENE, RECURRENCE NOT SPECIFIED, UNSPECIFIED HERNIA TYPE: ICD-10-CM

## 2019-01-01 DIAGNOSIS — R10.10 PAIN OF UPPER ABDOMEN: ICD-10-CM

## 2019-01-01 DIAGNOSIS — R93.1 ABNORMAL FINDINGS ON DIAGNOSTIC IMAGING OF HEART AND CORONARY CIRCULATION: ICD-10-CM

## 2019-01-01 DIAGNOSIS — M21.371 RIGHT FOOT DROP: ICD-10-CM

## 2019-01-01 DIAGNOSIS — R79.89 ELEVATED SERUM CREATININE: ICD-10-CM

## 2019-01-01 DIAGNOSIS — A04.8 H. PYLORI INFECTION: ICD-10-CM

## 2019-01-01 DIAGNOSIS — R10.33 PERIUMBILICAL ABDOMINAL PAIN: ICD-10-CM

## 2019-01-01 DIAGNOSIS — R14.0 ABDOMINAL BLOATING: ICD-10-CM

## 2019-01-01 DIAGNOSIS — R17 JAUNDICE: ICD-10-CM

## 2019-01-01 DIAGNOSIS — Z13.220 SCREENING FOR HYPERLIPIDEMIA: ICD-10-CM

## 2019-01-01 DIAGNOSIS — Z01.812 PRE-OPERATIVE LABORATORY EXAMINATION: ICD-10-CM

## 2019-01-01 DIAGNOSIS — I27.20 PULMONARY HYPERTENSION (HCC): ICD-10-CM

## 2019-01-01 DIAGNOSIS — N40.0 BENIGN PROSTATIC HYPERPLASIA, UNSPECIFIED WHETHER LOWER URINARY TRACT SYMPTOMS PRESENT: ICD-10-CM

## 2019-01-01 DIAGNOSIS — F32.A DEPRESSION, UNSPECIFIED DEPRESSION TYPE: ICD-10-CM

## 2019-01-01 DIAGNOSIS — K63.9 MURAL THICKENING OF COLON: ICD-10-CM

## 2019-01-01 DIAGNOSIS — R53.82 CHRONIC FATIGUE: ICD-10-CM

## 2019-01-01 DIAGNOSIS — R53.83 OTHER FATIGUE: ICD-10-CM

## 2019-01-01 DIAGNOSIS — G62.9 PERIPHERAL POLYNEUROPATHY: ICD-10-CM

## 2019-01-01 DIAGNOSIS — Z23 NEED FOR VACCINATION: ICD-10-CM

## 2019-01-01 DIAGNOSIS — E83.42 HYPOMAGNESEMIA: ICD-10-CM

## 2019-01-01 DIAGNOSIS — K92.1 MELENA: ICD-10-CM

## 2019-01-01 DIAGNOSIS — K31.89 GASTRIC NODULE: ICD-10-CM

## 2019-01-01 DIAGNOSIS — R60.1 ANASARCA: ICD-10-CM

## 2019-01-01 DIAGNOSIS — K59.1 FUNCTIONAL DIARRHEA: ICD-10-CM

## 2019-01-01 DIAGNOSIS — E53.8 VITAMIN B12 DEFICIENCY: ICD-10-CM

## 2019-01-01 DIAGNOSIS — R73.9 HYPERGLYCEMIA: ICD-10-CM

## 2019-01-01 DIAGNOSIS — Z13.29 SCREENING FOR ENDOCRINE DISORDER: ICD-10-CM

## 2019-01-01 DIAGNOSIS — R11.0 NAUSEA: ICD-10-CM

## 2019-01-01 DIAGNOSIS — R79.9 ELEVATED BUN: ICD-10-CM

## 2019-01-01 DIAGNOSIS — Z99.89 WALKER AS AMBULATION AID: ICD-10-CM

## 2019-01-01 DIAGNOSIS — N18.9 ACUTE KIDNEY INJURY SUPERIMPOSED ON CHRONIC KIDNEY DISEASE (HCC): ICD-10-CM

## 2019-01-01 DIAGNOSIS — Z91.81 AT HIGH RISK FOR FALLS: ICD-10-CM

## 2019-01-01 DIAGNOSIS — R63.4 WEIGHT LOSS: ICD-10-CM

## 2019-01-01 DIAGNOSIS — Z51.81 MEDICATION MONITORING ENCOUNTER: ICD-10-CM

## 2019-01-01 DIAGNOSIS — R20.2 TINGLING OF BOTH FEET: ICD-10-CM

## 2019-01-01 DIAGNOSIS — R79.89 ABNORMAL TSH: ICD-10-CM

## 2019-01-01 DIAGNOSIS — I35.0 MILD AORTIC STENOSIS BY PRIOR ECHOCARDIOGRAM: ICD-10-CM

## 2019-01-01 DIAGNOSIS — E55.9 VITAMIN D DEFICIENCY: ICD-10-CM

## 2019-01-01 DIAGNOSIS — E61.1 IRON DEFICIENCY: ICD-10-CM

## 2019-01-01 DIAGNOSIS — R41.89 COGNITIVE CHANGES: ICD-10-CM

## 2019-01-01 DIAGNOSIS — R63.4 UNINTENTIONAL WEIGHT LOSS: ICD-10-CM

## 2019-01-01 DIAGNOSIS — H61.23 BILATERAL IMPACTED CERUMEN: ICD-10-CM

## 2019-01-01 DIAGNOSIS — E87.20 METABOLIC ACIDOSIS: ICD-10-CM

## 2019-01-01 DIAGNOSIS — K43.9 SUPRAUMBILICAL HERNIA: ICD-10-CM

## 2019-01-01 DIAGNOSIS — N17.9 ACUTE KIDNEY INJURY SUPERIMPOSED ON CHRONIC KIDNEY DISEASE (HCC): ICD-10-CM

## 2019-01-01 DIAGNOSIS — Z11.3 ROUTINE SCREENING FOR STI (SEXUALLY TRANSMITTED INFECTION): ICD-10-CM

## 2019-01-01 DIAGNOSIS — R19.00 ABDOMINAL WALL BULGE: ICD-10-CM

## 2019-01-01 DIAGNOSIS — H91.8X3 OTHER SPECIFIED HEARING LOSS OF BOTH EARS: ICD-10-CM

## 2019-01-01 DIAGNOSIS — N17.9 AKI (ACUTE KIDNEY INJURY) (HCC): ICD-10-CM

## 2019-01-01 DIAGNOSIS — R29.898 WEAKNESS OF BOTH LEGS: ICD-10-CM

## 2019-01-01 DIAGNOSIS — R35.0 URINARY FREQUENCY: ICD-10-CM

## 2019-01-01 DIAGNOSIS — Z11.59 ENCOUNTER FOR HEPATITIS C SCREENING TEST FOR LOW RISK PATIENT: ICD-10-CM

## 2019-01-01 LAB
25(OH)D3 SERPL-MCNC: 27 NG/ML (ref 30–100)
ALBUMIN SERPL BCP-MCNC: 2.7 G/DL (ref 3.2–4.9)
ALBUMIN SERPL BCP-MCNC: 3.1 G/DL (ref 3.2–4.9)
ALBUMIN SERPL BCP-MCNC: 3.3 G/DL (ref 3.2–4.9)
ALBUMIN SERPL BCP-MCNC: 3.4 G/DL (ref 3.2–4.9)
ALBUMIN SERPL ELPH-MCNC: 2.88 G/DL (ref 3.75–5.01)
ALBUMIN/GLOB SERPL: 1.2 G/DL
ALBUMIN/GLOB SERPL: 1.3 G/DL
ALBUMIN/GLOB SERPL: 1.4 G/DL
ALBUMIN/GLOB SERPL: 1.4 G/DL
ALP SERPL-CCNC: 46 U/L (ref 30–99)
ALP SERPL-CCNC: 46 U/L (ref 30–99)
ALP SERPL-CCNC: 47 U/L (ref 30–99)
ALP SERPL-CCNC: 48 U/L (ref 30–99)
ALPHA1 GLOB SERPL ELPH-MCNC: 0.31 G/DL (ref 0.19–0.46)
ALPHA2 GLOB SERPL ELPH-MCNC: 0.61 G/DL (ref 0.48–1.05)
ALT SERPL-CCNC: 5 U/L (ref 2–50)
ALT SERPL-CCNC: 7 U/L (ref 2–50)
ALT SERPL-CCNC: <5 U/L (ref 2–50)
ALT SERPL-CCNC: <5 U/L (ref 2–50)
AMMONIA PLAS-SCNC: 23 UMOL/L (ref 11–45)
AMORPH CRY #/AREA URNS HPF: PRESENT /HPF
ANION GAP SERPL CALC-SCNC: 10 MMOL/L (ref 0–11.9)
ANION GAP SERPL CALC-SCNC: 11 MMOL/L (ref 0–11.9)
ANION GAP SERPL CALC-SCNC: 7 MMOL/L (ref 0–11.9)
ANION GAP SERPL CALC-SCNC: 7 MMOL/L (ref 0–11.9)
ANION GAP SERPL CALC-SCNC: 8 MMOL/L (ref 0–11.9)
APPEARANCE UR: ABNORMAL
APPEARANCE UR: NORMAL
APTT PPP: 40.2 SEC (ref 24.7–36)
AST SERPL-CCNC: 11 U/L (ref 12–45)
AST SERPL-CCNC: 6 U/L (ref 12–45)
AST SERPL-CCNC: 8 U/L (ref 12–45)
AST SERPL-CCNC: 9 U/L (ref 12–45)
B-GLOBULIN SERPL ELPH-MCNC: 0.63 G/DL (ref 0.48–1.1)
BACTERIA #/AREA URNS HPF: NEGATIVE /HPF
BASOPHILS # BLD AUTO: 0 % (ref 0–1.8)
BASOPHILS # BLD AUTO: 0.3 % (ref 0–1.8)
BASOPHILS # BLD AUTO: 0.4 % (ref 0–1.8)
BASOPHILS # BLD AUTO: 0.4 % (ref 0–1.8)
BASOPHILS # BLD AUTO: 0.5 % (ref 0–1.8)
BASOPHILS # BLD: 0 K/UL (ref 0–0.12)
BASOPHILS # BLD: 0.02 K/UL (ref 0–0.12)
BASOPHILS # BLD: 0.02 K/UL (ref 0–0.12)
BASOPHILS # BLD: 0.03 K/UL (ref 0–0.12)
BASOPHILS # BLD: 0.04 K/UL (ref 0–0.12)
BILIRUB SERPL-MCNC: 0.3 MG/DL (ref 0.1–1.5)
BILIRUB SERPL-MCNC: 0.3 MG/DL (ref 0.1–1.5)
BILIRUB SERPL-MCNC: 0.4 MG/DL (ref 0.1–1.5)
BILIRUB SERPL-MCNC: 0.5 MG/DL (ref 0.1–1.5)
BILIRUB UR QL STRIP.AUTO: NEGATIVE
BILIRUB UR STRIP-MCNC: NORMAL MG/DL
BUN SERPL-MCNC: 44 MG/DL (ref 8–22)
BUN SERPL-MCNC: 46 MG/DL (ref 8–22)
BUN SERPL-MCNC: 46 MG/DL (ref 8–22)
BUN SERPL-MCNC: 62 MG/DL (ref 8–22)
BUN SERPL-MCNC: 64 MG/DL (ref 8–22)
BUN SERPL-MCNC: 65 MG/DL (ref 8–22)
BUN SERPL-MCNC: 69 MG/DL (ref 8–22)
CALCIUM SERPL-MCNC: 7.6 MG/DL (ref 8.5–10.5)
CALCIUM SERPL-MCNC: 7.8 MG/DL (ref 8.5–10.5)
CALCIUM SERPL-MCNC: 7.9 MG/DL (ref 8.5–10.5)
CALCIUM SERPL-MCNC: 8 MG/DL (ref 8.5–10.5)
CALCIUM SERPL-MCNC: 8.3 MG/DL (ref 8.5–10.5)
CALCIUM SERPL-MCNC: 8.5 MG/DL (ref 8.5–10.5)
CALCIUM SERPL-MCNC: 8.5 MG/DL (ref 8.5–10.5)
CHLORIDE SERPL-SCNC: 110 MMOL/L (ref 96–112)
CHLORIDE SERPL-SCNC: 110 MMOL/L (ref 96–112)
CHLORIDE SERPL-SCNC: 111 MMOL/L (ref 96–112)
CHLORIDE SERPL-SCNC: 112 MMOL/L (ref 96–112)
CHLORIDE SERPL-SCNC: 112 MMOL/L (ref 96–112)
CHLORIDE SERPL-SCNC: 113 MMOL/L (ref 96–112)
CHLORIDE SERPL-SCNC: 114 MMOL/L (ref 96–112)
CHOLEST SERPL-MCNC: 94 MG/DL (ref 100–199)
CO2 SERPL-SCNC: 10 MMOL/L (ref 20–33)
CO2 SERPL-SCNC: 12 MMOL/L (ref 20–33)
CO2 SERPL-SCNC: 13 MMOL/L (ref 20–33)
CO2 SERPL-SCNC: 13 MMOL/L (ref 20–33)
CO2 SERPL-SCNC: 20 MMOL/L (ref 20–33)
CO2 SERPL-SCNC: 21 MMOL/L (ref 20–33)
CO2 SERPL-SCNC: 21 MMOL/L (ref 20–33)
COLOR UR AUTO: NORMAL
COLOR UR: YELLOW
CREAT SERPL-MCNC: 1.79 MG/DL (ref 0.5–1.4)
CREAT SERPL-MCNC: 2.09 MG/DL (ref 0.5–1.4)
CREAT SERPL-MCNC: 2.28 MG/DL (ref 0.5–1.4)
CREAT SERPL-MCNC: 3.2 MG/DL (ref 0.5–1.4)
CREAT SERPL-MCNC: 3.25 MG/DL (ref 0.5–1.4)
CREAT SERPL-MCNC: 3.26 MG/DL (ref 0.5–1.4)
CREAT SERPL-MCNC: 3.38 MG/DL (ref 0.5–1.4)
EOSINOPHIL # BLD AUTO: 0 K/UL (ref 0–0.51)
EOSINOPHIL # BLD AUTO: 0.04 K/UL (ref 0–0.51)
EOSINOPHIL # BLD AUTO: 0.04 K/UL (ref 0–0.51)
EOSINOPHIL # BLD AUTO: 0.07 K/UL (ref 0–0.51)
EOSINOPHIL # BLD AUTO: 0.1 K/UL (ref 0–0.51)
EOSINOPHIL NFR BLD: 0 % (ref 0–6.9)
EOSINOPHIL NFR BLD: 0.5 % (ref 0–6.9)
EOSINOPHIL NFR BLD: 0.7 % (ref 0–6.9)
EOSINOPHIL NFR BLD: 1 % (ref 0–6.9)
EOSINOPHIL NFR BLD: 1.8 % (ref 0–6.9)
EPI CELLS #/AREA URNS HPF: NEGATIVE /HPF
ERYTHROCYTE [DISTWIDTH] IN BLOOD BY AUTOMATED COUNT: 45.6 FL (ref 35.9–50)
ERYTHROCYTE [DISTWIDTH] IN BLOOD BY AUTOMATED COUNT: 47.9 FL (ref 35.9–50)
ERYTHROCYTE [DISTWIDTH] IN BLOOD BY AUTOMATED COUNT: 48.3 FL (ref 35.9–50)
ERYTHROCYTE [DISTWIDTH] IN BLOOD BY AUTOMATED COUNT: 49.1 FL (ref 35.9–50)
ERYTHROCYTE [DISTWIDTH] IN BLOOD BY AUTOMATED COUNT: 49.9 FL (ref 35.9–50)
ERYTHROCYTE [DISTWIDTH] IN BLOOD BY AUTOMATED COUNT: 50.5 FL (ref 35.9–50)
FASTING STATUS PATIENT QL REPORTED: NORMAL
FOLATE SERPL-MCNC: 6.2 NG/ML
GAMMA GLOB SERPL ELPH-MCNC: 0.86 G/DL (ref 0.62–1.51)
GGT SERPL-CCNC: 7 U/L (ref 7–51)
GLOBULIN SER CALC-MCNC: 2.3 G/DL (ref 1.9–3.5)
GLOBULIN SER CALC-MCNC: 2.4 G/DL (ref 1.9–3.5)
GLUCOSE SERPL-MCNC: 106 MG/DL (ref 65–99)
GLUCOSE SERPL-MCNC: 142 MG/DL (ref 65–99)
GLUCOSE SERPL-MCNC: 148 MG/DL (ref 65–99)
GLUCOSE SERPL-MCNC: 81 MG/DL (ref 65–99)
GLUCOSE SERPL-MCNC: 94 MG/DL (ref 65–99)
GLUCOSE SERPL-MCNC: 94 MG/DL (ref 65–99)
GLUCOSE SERPL-MCNC: 98 MG/DL (ref 65–99)
GLUCOSE UR STRIP.AUTO-MCNC: NEGATIVE MG/DL
GLUCOSE UR STRIP.AUTO-MCNC: NORMAL MG/DL
HCT VFR BLD AUTO: 36 % (ref 42–52)
HCT VFR BLD AUTO: 38.2 % (ref 42–52)
HCT VFR BLD AUTO: 38.4 % (ref 42–52)
HCT VFR BLD AUTO: 38.5 % (ref 42–52)
HCT VFR BLD AUTO: 40.4 % (ref 42–52)
HCT VFR BLD AUTO: 42.3 % (ref 42–52)
HDLC SERPL-MCNC: 31 MG/DL
HGB BLD-MCNC: 11.1 G/DL (ref 14–18)
HGB BLD-MCNC: 11.8 G/DL (ref 14–18)
HGB BLD-MCNC: 11.9 G/DL (ref 14–18)
HGB BLD-MCNC: 12 G/DL (ref 14–18)
HGB BLD-MCNC: 12.9 G/DL (ref 14–18)
HGB BLD-MCNC: 13.1 G/DL (ref 14–18)
HYALINE CASTS #/AREA URNS LPF: ABNORMAL /LPF
IF BLOCK AB SER QL RIA: NEGATIVE
IGA SERPL-MCNC: 269 MG/DL (ref 68–408)
IGG SERPL-MCNC: 936 MG/DL (ref 768–1632)
IGM SERPL-MCNC: 96 MG/DL (ref 35–263)
IMM GRANULOCYTES # BLD AUTO: 0.01 K/UL (ref 0–0.11)
IMM GRANULOCYTES # BLD AUTO: 0.02 K/UL (ref 0–0.11)
IMM GRANULOCYTES # BLD AUTO: 0.03 K/UL (ref 0–0.11)
IMM GRANULOCYTES NFR BLD AUTO: 0.2 % (ref 0–0.9)
IMM GRANULOCYTES NFR BLD AUTO: 0.3 % (ref 0–0.9)
IMM GRANULOCYTES NFR BLD AUTO: 0.4 % (ref 0–0.9)
IMM GRANULOCYTES NFR BLD AUTO: 0.4 % (ref 0–0.9)
IMM GRANULOCYTES NFR BLD AUTO: 0.5 % (ref 0–0.9)
INR PPP: 1.25 (ref 0.87–1.13)
INTERPRETATION SERPL IFE-IMP: ABNORMAL
INTERPRETATION SERPL IFE-IMP: ABNORMAL
KETONES UR STRIP.AUTO-MCNC: NEGATIVE MG/DL
KETONES UR STRIP.AUTO-MCNC: NORMAL MG/DL
LDLC SERPL CALC-MCNC: 46 MG/DL
LEUKOCYTE ESTERASE UR QL STRIP.AUTO: ABNORMAL
LEUKOCYTE ESTERASE UR QL STRIP.AUTO: NORMAL
LIPASE SERPL-CCNC: 14 U/L (ref 11–82)
LV EJECT FRACT  99904: 65
LV EJECT FRACT MOD 2C 99903: 63.13
LV EJECT FRACT MOD 4C 99902: 58.46
LV EJECT FRACT MOD BP 99901: 60.97
LYMPHOCYTES # BLD AUTO: 0.92 K/UL (ref 1–4.8)
LYMPHOCYTES # BLD AUTO: 1.65 K/UL (ref 1–4.8)
LYMPHOCYTES # BLD AUTO: 1.86 K/UL (ref 1–4.8)
LYMPHOCYTES # BLD AUTO: 2.01 K/UL (ref 1–4.8)
LYMPHOCYTES # BLD AUTO: 2.28 K/UL (ref 1–4.8)
LYMPHOCYTES NFR BLD: 19.5 % (ref 22–41)
LYMPHOCYTES NFR BLD: 21.1 % (ref 22–41)
LYMPHOCYTES NFR BLD: 31 % (ref 22–41)
LYMPHOCYTES NFR BLD: 32.3 % (ref 22–41)
LYMPHOCYTES NFR BLD: 35.6 % (ref 22–41)
MAGNESIUM SERPL-MCNC: 2.1 MG/DL (ref 1.5–2.5)
MCH RBC QN AUTO: 28.4 PG (ref 27–33)
MCH RBC QN AUTO: 28.5 PG (ref 27–33)
MCH RBC QN AUTO: 28.6 PG (ref 27–33)
MCH RBC QN AUTO: 28.8 PG (ref 27–33)
MCH RBC QN AUTO: 29.1 PG (ref 27–33)
MCH RBC QN AUTO: 29.3 PG (ref 27–33)
MCHC RBC AUTO-ENTMCNC: 30.6 G/DL (ref 33.7–35.3)
MCHC RBC AUTO-ENTMCNC: 30.8 G/DL (ref 33.7–35.3)
MCHC RBC AUTO-ENTMCNC: 31 G/DL (ref 33.7–35.3)
MCHC RBC AUTO-ENTMCNC: 31.2 G/DL (ref 33.7–35.3)
MCHC RBC AUTO-ENTMCNC: 31.3 G/DL (ref 33.7–35.3)
MCHC RBC AUTO-ENTMCNC: 31.9 G/DL (ref 33.7–35.3)
MCV RBC AUTO: 91.2 FL (ref 81.4–97.8)
MCV RBC AUTO: 91.6 FL (ref 81.4–97.8)
MCV RBC AUTO: 91.6 FL (ref 81.4–97.8)
MCV RBC AUTO: 93.4 FL (ref 81.4–97.8)
MCV RBC AUTO: 93.5 FL (ref 81.4–97.8)
MCV RBC AUTO: 93.7 FL (ref 81.4–97.8)
METHYLMALONATE SERPL-SCNC: 0.16 UMOL/L (ref 0–0.4)
MICRO URNS: ABNORMAL
MONOCYTES # BLD AUTO: 0.09 K/UL (ref 0–0.85)
MONOCYTES # BLD AUTO: 0.44 K/UL (ref 0–0.85)
MONOCYTES # BLD AUTO: 0.49 K/UL (ref 0–0.85)
MONOCYTES # BLD AUTO: 0.64 K/UL (ref 0–0.85)
MONOCYTES # BLD AUTO: 0.77 K/UL (ref 0–0.85)
MONOCYTES NFR BLD AUTO: 10.5 % (ref 0–13.4)
MONOCYTES NFR BLD AUTO: 2.1 % (ref 0–13.4)
MONOCYTES NFR BLD AUTO: 7.6 % (ref 0–13.4)
MONOCYTES NFR BLD AUTO: 7.6 % (ref 0–13.4)
MONOCYTES NFR BLD AUTO: 8.7 % (ref 0–13.4)
NEUTROPHILS # BLD AUTO: 2.99 K/UL (ref 1.82–7.42)
NEUTROPHILS # BLD AUTO: 3.35 K/UL (ref 1.82–7.42)
NEUTROPHILS # BLD AUTO: 3.38 K/UL (ref 1.82–7.42)
NEUTROPHILS # BLD AUTO: 4.17 K/UL (ref 1.82–7.42)
NEUTROPHILS # BLD AUTO: 6.08 K/UL (ref 1.82–7.42)
NEUTROPHILS NFR BLD: 53 % (ref 44–72)
NEUTROPHILS NFR BLD: 56.6 % (ref 44–72)
NEUTROPHILS NFR BLD: 58.8 % (ref 44–72)
NEUTROPHILS NFR BLD: 71.6 % (ref 44–72)
NEUTROPHILS NFR BLD: 76.6 % (ref 44–72)
NITRITE UR QL STRIP.AUTO: NEGATIVE
NITRITE UR QL STRIP.AUTO: NORMAL
NRBC # BLD AUTO: 0 K/UL
NRBC BLD-RTO: 0 /100 WBC
PATHOLOGY CONSULT NOTE: NORMAL
PATHOLOGY STUDY: ABNORMAL
PH UR STRIP.AUTO: 5 [PH] (ref 5–8)
PH UR STRIP.AUTO: 5 [PH] (ref 5–8)
PLATELET # BLD AUTO: 119 K/UL (ref 164–446)
PLATELET # BLD AUTO: 158 K/UL (ref 164–446)
PLATELET # BLD AUTO: 162 K/UL (ref 164–446)
PLATELET # BLD AUTO: 257 K/UL (ref 164–446)
PLATELET # BLD AUTO: 270 K/UL (ref 164–446)
PLATELET # BLD AUTO: 312 K/UL (ref 164–446)
PMV BLD AUTO: 10.1 FL (ref 9–12.9)
PMV BLD AUTO: 10.2 FL (ref 9–12.9)
PMV BLD AUTO: 10.6 FL (ref 9–12.9)
PMV BLD AUTO: 9.1 FL (ref 9–12.9)
PMV BLD AUTO: 9.5 FL (ref 9–12.9)
PMV BLD AUTO: 9.7 FL (ref 9–12.9)
POTASSIUM SERPL-SCNC: 4.4 MMOL/L (ref 3.6–5.5)
POTASSIUM SERPL-SCNC: 4.8 MMOL/L (ref 3.6–5.5)
POTASSIUM SERPL-SCNC: 4.9 MMOL/L (ref 3.6–5.5)
POTASSIUM SERPL-SCNC: 5 MMOL/L (ref 3.6–5.5)
POTASSIUM SERPL-SCNC: 5.3 MMOL/L (ref 3.6–5.5)
PROCALCITONIN SERPL-MCNC: 0.43 NG/ML
PROT SERPL-MCNC: 5 G/DL (ref 6–8.2)
PROT SERPL-MCNC: 5.3 G/DL (ref 6.3–8.2)
PROT SERPL-MCNC: 5.5 G/DL (ref 6–8.2)
PROT SERPL-MCNC: 5.7 G/DL (ref 6–8.2)
PROT SERPL-MCNC: 5.8 G/DL (ref 6–8.2)
PROT UR QL STRIP: 100 MG/DL
PROT UR QL STRIP: 30 MG/DL
PROTHROMBIN TIME: 16 SEC (ref 12–14.6)
RBC # BLD AUTO: 3.85 M/UL (ref 4.7–6.1)
RBC # BLD AUTO: 4.1 M/UL (ref 4.7–6.1)
RBC # BLD AUTO: 4.12 M/UL (ref 4.7–6.1)
RBC # BLD AUTO: 4.17 M/UL (ref 4.7–6.1)
RBC # BLD AUTO: 4.43 M/UL (ref 4.7–6.1)
RBC # BLD AUTO: 4.62 M/UL (ref 4.7–6.1)
RBC # URNS HPF: ABNORMAL /HPF
RBC UR QL AUTO: NEGATIVE
RBC UR QL AUTO: NORMAL
SODIUM SERPL-SCNC: 134 MMOL/L (ref 135–145)
SODIUM SERPL-SCNC: 135 MMOL/L (ref 135–145)
SODIUM SERPL-SCNC: 136 MMOL/L (ref 135–145)
SODIUM SERPL-SCNC: 136 MMOL/L (ref 135–145)
SODIUM SERPL-SCNC: 138 MMOL/L (ref 135–145)
SODIUM SERPL-SCNC: 138 MMOL/L (ref 135–145)
SODIUM SERPL-SCNC: 140 MMOL/L (ref 135–145)
SP GR UR STRIP.AUTO: 1.02
SP GR UR STRIP.AUTO: >1.03
T4 FREE SERPL-MCNC: 0.62 NG/DL (ref 0.53–1.43)
T4 FREE SERPL-MCNC: 0.72 NG/DL (ref 0.53–1.43)
TRIGL SERPL-MCNC: 83 MG/DL (ref 0–149)
TSH SERPL DL<=0.005 MIU/L-ACNC: 5.69 UIU/ML (ref 0.38–5.33)
TSH SERPL DL<=0.005 MIU/L-ACNC: 7.12 UIU/ML (ref 0.38–5.33)
UROBILINOGEN UR STRIP-MCNC: 0.2 MG/DL
UROBILINOGEN UR STRIP.AUTO-MCNC: 0.2 MG/DL
VIT B12 SERPL-MCNC: 218 PG/ML (ref 211–911)
VIT B12 SERPL-MCNC: >1500 PG/ML (ref 211–911)
WBC # BLD AUTO: 4.4 K/UL (ref 4.8–10.8)
WBC # BLD AUTO: 5.6 K/UL (ref 4.8–10.8)
WBC # BLD AUTO: 5.8 K/UL (ref 4.8–10.8)
WBC # BLD AUTO: 7.4 K/UL (ref 4.8–10.8)
WBC # BLD AUTO: 8.5 K/UL (ref 4.8–10.8)
WBC # BLD AUTO: 9.1 K/UL (ref 4.8–10.8)
WBC #/AREA URNS HPF: ABNORMAL /HPF

## 2019-01-01 PROCEDURE — 80053 COMPREHEN METABOLIC PANEL: CPT

## 2019-01-01 PROCEDURE — 93306 TTE W/DOPPLER COMPLETE: CPT

## 2019-01-01 PROCEDURE — 36415 COLL VENOUS BLD VENIPUNCTURE: CPT

## 2019-01-01 PROCEDURE — 84443 ASSAY THYROID STIM HORMONE: CPT

## 2019-01-01 PROCEDURE — 99220 PR INITIAL OBSERVATION CARE,LEVL III: CPT | Performed by: INTERNAL MEDICINE

## 2019-01-01 PROCEDURE — 700105 HCHG RX REV CODE 258: Performed by: NURSE PRACTITIONER

## 2019-01-01 PROCEDURE — 96375 TX/PRO/DX INJ NEW DRUG ADDON: CPT

## 2019-01-01 PROCEDURE — 97161 PT EVAL LOW COMPLEX 20 MIN: CPT

## 2019-01-01 PROCEDURE — 82784 ASSAY IGA/IGD/IGG/IGM EACH: CPT

## 2019-01-01 PROCEDURE — 99214 OFFICE O/P EST MOD 30 MIN: CPT | Performed by: FAMILY MEDICINE

## 2019-01-01 PROCEDURE — 71045 X-RAY EXAM CHEST 1 VIEW: CPT

## 2019-01-01 PROCEDURE — 700105 HCHG RX REV CODE 258: Performed by: INTERNAL MEDICINE

## 2019-01-01 PROCEDURE — 99225 PR SUBSEQUENT OBSERVATION CARE,LEVEL II: CPT | Performed by: INTERNAL MEDICINE

## 2019-01-01 PROCEDURE — 80048 BASIC METABOLIC PNL TOTAL CA: CPT

## 2019-01-01 PROCEDURE — A9270 NON-COVERED ITEM OR SERVICE: HCPCS | Performed by: INTERNAL MEDICINE

## 2019-01-01 PROCEDURE — 72148 MRI LUMBAR SPINE W/O DYE: CPT

## 2019-01-01 PROCEDURE — 78452 HT MUSCLE IMAGE SPECT MULT: CPT | Mod: 26 | Performed by: INTERNAL MEDICINE

## 2019-01-01 PROCEDURE — G0008 ADMIN INFLUENZA VIRUS VAC: HCPCS | Performed by: INTERNAL MEDICINE

## 2019-01-01 PROCEDURE — 160203 HCHG ENDO MINUTES - 1ST 30 MINS LEVEL 4: Performed by: INTERNAL MEDICINE

## 2019-01-01 PROCEDURE — G0378 HOSPITAL OBSERVATION PER HR: HCPCS

## 2019-01-01 PROCEDURE — 99214 OFFICE O/P EST MOD 30 MIN: CPT | Performed by: INTERNAL MEDICINE

## 2019-01-01 PROCEDURE — 82977 ASSAY OF GGT: CPT

## 2019-01-01 PROCEDURE — 70450 CT HEAD/BRAIN W/O DYE: CPT

## 2019-01-01 PROCEDURE — 99217 PR OBSERVATION CARE DISCHARGE: CPT | Performed by: INTERNAL MEDICINE

## 2019-01-01 PROCEDURE — 160036 HCHG PACU - EA ADDL 30 MINS PHASE I: Performed by: INTERNAL MEDICINE

## 2019-01-01 PROCEDURE — 85025 COMPLETE CBC W/AUTO DIFF WBC: CPT

## 2019-01-01 PROCEDURE — 160046 HCHG PACU - 1ST 60 MINS PHASE II: Performed by: INTERNAL MEDICINE

## 2019-01-01 PROCEDURE — 82607 VITAMIN B-12: CPT

## 2019-01-01 PROCEDURE — 700111 HCHG RX REV CODE 636 W/ 250 OVERRIDE (IP): Performed by: INTERNAL MEDICINE

## 2019-01-01 PROCEDURE — 700111 HCHG RX REV CODE 636 W/ 250 OVERRIDE (IP): Performed by: ANESTHESIOLOGY

## 2019-01-01 PROCEDURE — 85610 PROTHROMBIN TIME: CPT

## 2019-01-01 PROCEDURE — 70551 MRI BRAIN STEM W/O DYE: CPT

## 2019-01-01 PROCEDURE — 160002 HCHG RECOVERY MINUTES (STAT): Performed by: INTERNAL MEDICINE

## 2019-01-01 PROCEDURE — 96374 THER/PROPH/DIAG INJ IV PUSH: CPT

## 2019-01-01 PROCEDURE — 81002 URINALYSIS NONAUTO W/O SCOPE: CPT | Performed by: INTERNAL MEDICINE

## 2019-01-01 PROCEDURE — 99203 OFFICE O/P NEW LOW 30 MIN: CPT

## 2019-01-01 PROCEDURE — 99214 OFFICE O/P EST MOD 30 MIN: CPT | Mod: 25 | Performed by: INTERNAL MEDICINE

## 2019-01-01 PROCEDURE — 83735 ASSAY OF MAGNESIUM: CPT

## 2019-01-01 PROCEDURE — 700111 HCHG RX REV CODE 636 W/ 250 OVERRIDE (IP): Performed by: EMERGENCY MEDICINE

## 2019-01-01 PROCEDURE — 82306 VITAMIN D 25 HYDROXY: CPT

## 2019-01-01 PROCEDURE — 83690 ASSAY OF LIPASE: CPT

## 2019-01-01 PROCEDURE — 93018 CV STRESS TEST I&R ONLY: CPT | Performed by: INTERNAL MEDICINE

## 2019-01-01 PROCEDURE — 93000 ELECTROCARDIOGRAM COMPLETE: CPT | Performed by: INTERNAL MEDICINE

## 2019-01-01 PROCEDURE — 500066 HCHG BITE BLOCK, ECT: Performed by: INTERNAL MEDICINE

## 2019-01-01 PROCEDURE — 700102 HCHG RX REV CODE 250 W/ 637 OVERRIDE(OP): Performed by: INTERNAL MEDICINE

## 2019-01-01 PROCEDURE — 76700 US EXAM ABDOM COMPLETE: CPT

## 2019-01-01 PROCEDURE — 96372 THER/PROPH/DIAG INJ SC/IM: CPT | Performed by: INTERNAL MEDICINE

## 2019-01-01 PROCEDURE — G0439 PPPS, SUBSEQ VISIT: HCPCS | Performed by: INTERNAL MEDICINE

## 2019-01-01 PROCEDURE — 700101 HCHG RX REV CODE 250: Performed by: ANESTHESIOLOGY

## 2019-01-01 PROCEDURE — 700102 HCHG RX REV CODE 250 W/ 637 OVERRIDE(OP): Performed by: NURSE PRACTITIONER

## 2019-01-01 PROCEDURE — 71250 CT THORAX DX C-: CPT

## 2019-01-01 PROCEDURE — 71046 X-RAY EXAM CHEST 2 VIEWS: CPT

## 2019-01-01 PROCEDURE — 83921 ORGANIC ACID SINGLE QUANT: CPT

## 2019-01-01 PROCEDURE — 84165 PROTEIN E-PHORESIS SERUM: CPT

## 2019-01-01 PROCEDURE — 160025 RECOVERY II MINUTES (STATS): Performed by: INTERNAL MEDICINE

## 2019-01-01 PROCEDURE — 99204 OFFICE O/P NEW MOD 45 MIN: CPT | Performed by: INTERNAL MEDICINE

## 2019-01-01 PROCEDURE — 96376 TX/PRO/DX INJ SAME DRUG ADON: CPT

## 2019-01-01 PROCEDURE — 99285 EMERGENCY DEPT VISIT HI MDM: CPT | Mod: 25

## 2019-01-01 PROCEDURE — 86340 INTRINSIC FACTOR ANTIBODY: CPT

## 2019-01-01 PROCEDURE — 84155 ASSAY OF PROTEIN SERUM: CPT

## 2019-01-01 PROCEDURE — 160048 HCHG OR STATISTICAL LEVEL 1-5: Performed by: INTERNAL MEDICINE

## 2019-01-01 PROCEDURE — 74176 CT ABD & PELVIS W/O CONTRAST: CPT

## 2019-01-01 PROCEDURE — 93306 TTE W/DOPPLER COMPLETE: CPT | Mod: 26 | Performed by: INTERNAL MEDICINE

## 2019-01-01 PROCEDURE — 160009 HCHG ANES TIME/MIN: Performed by: INTERNAL MEDICINE

## 2019-01-01 PROCEDURE — 160035 HCHG PACU - 1ST 60 MINS PHASE I: Performed by: INTERNAL MEDICINE

## 2019-01-01 PROCEDURE — 95886 MUSC TEST DONE W/N TEST COMP: CPT | Performed by: PSYCHIATRY & NEUROLOGY

## 2019-01-01 PROCEDURE — A9270 NON-COVERED ITEM OR SERVICE: HCPCS | Performed by: NURSE PRACTITIONER

## 2019-01-01 PROCEDURE — 85730 THROMBOPLASTIN TIME PARTIAL: CPT

## 2019-01-01 PROCEDURE — 84439 ASSAY OF FREE THYROXINE: CPT

## 2019-01-01 PROCEDURE — 88305 TISSUE EXAM BY PATHOLOGIST: CPT

## 2019-01-01 PROCEDURE — 700111 HCHG RX REV CODE 636 W/ 250 OVERRIDE (IP)

## 2019-01-01 PROCEDURE — 81001 URINALYSIS AUTO W/SCOPE: CPT

## 2019-01-01 PROCEDURE — 82746 ASSAY OF FOLIC ACID SERUM: CPT

## 2019-01-01 PROCEDURE — 160208 HCHG ENDO MINUTES - EA ADDL 1 MIN LEVEL 4: Performed by: INTERNAL MEDICINE

## 2019-01-01 PROCEDURE — 96372 THER/PROPH/DIAG INJ SC/IM: CPT | Performed by: FAMILY MEDICINE

## 2019-01-01 PROCEDURE — A9502 TC99M TETROFOSMIN: HCPCS

## 2019-01-01 PROCEDURE — 82140 ASSAY OF AMMONIA: CPT

## 2019-01-01 PROCEDURE — 85027 COMPLETE CBC AUTOMATED: CPT

## 2019-01-01 PROCEDURE — 99285 EMERGENCY DEPT VISIT HI MDM: CPT

## 2019-01-01 PROCEDURE — 90662 IIV NO PRSV INCREASED AG IM: CPT | Performed by: INTERNAL MEDICINE

## 2019-01-01 PROCEDURE — 8041 PR SCP AHA: Performed by: INTERNAL MEDICINE

## 2019-01-01 PROCEDURE — 82150 ASSAY OF AMYLASE: CPT

## 2019-01-01 PROCEDURE — 95912 NRV CNDJ TEST 11-12 STUDIES: CPT | Performed by: PSYCHIATRY & NEUROLOGY

## 2019-01-01 PROCEDURE — 99214 OFFICE O/P EST MOD 30 MIN: CPT | Performed by: NURSE PRACTITIONER

## 2019-01-01 PROCEDURE — 51798 US URINE CAPACITY MEASURE: CPT

## 2019-01-01 PROCEDURE — 86334 IMMUNOFIX E-PHORESIS SERUM: CPT

## 2019-01-01 PROCEDURE — 84145 PROCALCITONIN (PCT): CPT

## 2019-01-01 PROCEDURE — 80061 LIPID PANEL: CPT

## 2019-01-01 RX ORDER — ERYTHROMYCIN 5 MG/G
OINTMENT OPHTHALMIC
COMMUNITY
Start: 2019-01-01 | End: 2019-01-01

## 2019-01-01 RX ORDER — METHYLPREDNISOLONE SODIUM SUCCINATE 40 MG/ML
40 INJECTION, POWDER, LYOPHILIZED, FOR SOLUTION INTRAMUSCULAR; INTRAVENOUS DAILY
Status: DISCONTINUED | OUTPATIENT
Start: 2019-01-01 | End: 2019-01-01 | Stop reason: HOSPADM

## 2019-01-01 RX ORDER — CLARITHROMYCIN 500 MG/1
500 TABLET, COATED ORAL 2 TIMES DAILY
COMMUNITY
End: 2019-01-01

## 2019-01-01 RX ORDER — HYDROMORPHONE HYDROCHLORIDE 1 MG/ML
0.2 INJECTION, SOLUTION INTRAMUSCULAR; INTRAVENOUS; SUBCUTANEOUS
Status: DISCONTINUED | OUTPATIENT
Start: 2019-01-01 | End: 2019-01-01

## 2019-01-01 RX ORDER — TAMSULOSIN HYDROCHLORIDE 0.4 MG/1
CAPSULE ORAL
COMMUNITY
Start: 2019-01-01 | End: 2019-01-01

## 2019-01-01 RX ORDER — DIPHENHYDRAMINE HCL 25 MG
25 TABLET ORAL EVERY 12 HOURS
Status: DISCONTINUED | OUTPATIENT
Start: 2019-01-01 | End: 2019-01-01 | Stop reason: HOSPADM

## 2019-01-01 RX ORDER — HYDROMORPHONE HYDROCHLORIDE 1 MG/ML
0.1 INJECTION, SOLUTION INTRAMUSCULAR; INTRAVENOUS; SUBCUTANEOUS
Status: DISCONTINUED | OUTPATIENT
Start: 2019-01-01 | End: 2019-01-01

## 2019-01-01 RX ORDER — BISACODYL 10 MG
10 SUPPOSITORY, RECTAL RECTAL
Status: DISCONTINUED | OUTPATIENT
Start: 2019-01-01 | End: 2019-01-01 | Stop reason: HOSPADM

## 2019-01-01 RX ORDER — POLYETHYLENE GLYCOL 3350 17 G/17G
1 POWDER, FOR SOLUTION ORAL
Status: DISCONTINUED | OUTPATIENT
Start: 2019-01-01 | End: 2019-01-01 | Stop reason: HOSPADM

## 2019-01-01 RX ORDER — PREDNISONE 20 MG/1
40 TABLET ORAL DAILY
Qty: 6 TAB | Refills: 0 | Status: SHIPPED | OUTPATIENT
Start: 2019-01-01 | End: 2019-01-01

## 2019-01-01 RX ORDER — TAMSULOSIN HYDROCHLORIDE 0.4 MG/1
0.4 CAPSULE ORAL 2 TIMES DAILY
COMMUNITY

## 2019-01-01 RX ORDER — ALBUTEROL SULFATE 90 UG/1
2 AEROSOL, METERED RESPIRATORY (INHALATION) EVERY 4 HOURS PRN
Qty: 8.5 G | Refills: 0 | Status: SHIPPED | OUTPATIENT
Start: 2019-01-01

## 2019-01-01 RX ORDER — EPINEPHRINE 1 MG/ML(1)
AMPUL (ML) INJECTION PRN
Status: DISCONTINUED | OUTPATIENT
Start: 2019-01-01 | End: 2019-01-01 | Stop reason: SURG

## 2019-01-01 RX ORDER — PHENYLEPHRINE HYDROCHLORIDE 10 MG/ML
INJECTION, SOLUTION INTRAMUSCULAR; INTRAVENOUS; SUBCUTANEOUS PRN
Status: DISCONTINUED | OUTPATIENT
Start: 2019-01-01 | End: 2019-01-01 | Stop reason: SURG

## 2019-01-01 RX ORDER — OMEPRAZOLE 20 MG/1
CAPSULE, DELAYED RELEASE ORAL
COMMUNITY
Start: 2019-01-01 | End: 2019-01-01

## 2019-01-01 RX ORDER — BUDESONIDE AND FORMOTEROL FUMARATE DIHYDRATE 80; 4.5 UG/1; UG/1
2 AEROSOL RESPIRATORY (INHALATION) 2 TIMES DAILY
Qty: 1 INHALER | Refills: 0 | Status: SHIPPED | OUTPATIENT
Start: 2019-01-01

## 2019-01-01 RX ORDER — GABAPENTIN 100 MG/1
100 CAPSULE ORAL EVERY EVENING
Qty: 90 CAP | Refills: 3 | Status: SHIPPED | OUTPATIENT
Start: 2019-01-01

## 2019-01-01 RX ORDER — ONDANSETRON 2 MG/ML
4 INJECTION INTRAMUSCULAR; INTRAVENOUS
Status: DISCONTINUED | OUTPATIENT
Start: 2019-01-01 | End: 2019-01-01

## 2019-01-01 RX ORDER — OMEPRAZOLE 20 MG/1
20 CAPSULE, DELAYED RELEASE ORAL 2 TIMES DAILY
Status: DISCONTINUED | OUTPATIENT
Start: 2019-01-01 | End: 2019-01-01 | Stop reason: HOSPADM

## 2019-01-01 RX ORDER — BISMUTH SUBSALICYLATE 262 MG/1
524 TABLET, CHEWABLE ORAL
Qty: 112 TAB | Refills: 0 | Status: ON HOLD
Start: 2019-01-01 | End: 2019-01-01

## 2019-01-01 RX ORDER — ONDANSETRON 4 MG/1
4 TABLET, ORALLY DISINTEGRATING ORAL EVERY 6 HOURS PRN
Qty: 30 TAB | Refills: 0 | Status: SHIPPED | OUTPATIENT
Start: 2019-01-01

## 2019-01-01 RX ORDER — OXYCODONE HCL 5 MG/5 ML
5 SOLUTION, ORAL ORAL
Status: DISCONTINUED | OUTPATIENT
Start: 2019-01-01 | End: 2019-01-01

## 2019-01-01 RX ORDER — SODIUM BICARBONATE 325 MG/1
325 TABLET ORAL 3 TIMES DAILY
Status: DISCONTINUED | OUTPATIENT
Start: 2019-01-01 | End: 2019-01-01 | Stop reason: HOSPADM

## 2019-01-01 RX ORDER — SODIUM CHLORIDE 9 MG/ML
INJECTION, SOLUTION INTRAVENOUS CONTINUOUS
Status: DISCONTINUED | OUTPATIENT
Start: 2019-01-01 | End: 2019-01-01

## 2019-01-01 RX ORDER — THIAMINE HCL 100 MG
2500 TABLET ORAL EVERY MORNING
COMMUNITY
End: 2020-01-01

## 2019-01-01 RX ORDER — CYANOCOBALAMIN 1000 UG/ML
1000 INJECTION, SOLUTION INTRAMUSCULAR; SUBCUTANEOUS
Status: DISCONTINUED | OUTPATIENT
Start: 2019-01-01 | End: 2019-01-01

## 2019-01-01 RX ORDER — DIPHENHYDRAMINE HYDROCHLORIDE 50 MG/ML
25 INJECTION INTRAMUSCULAR; INTRAVENOUS ONCE
Status: COMPLETED | OUTPATIENT
Start: 2019-01-01 | End: 2019-01-01

## 2019-01-01 RX ORDER — METOPROLOL TARTRATE 1 MG/ML
1 INJECTION, SOLUTION INTRAVENOUS
Status: DISCONTINUED | OUTPATIENT
Start: 2019-01-01 | End: 2019-01-01

## 2019-01-01 RX ORDER — SODIUM CHLORIDE, SODIUM LACTATE, POTASSIUM CHLORIDE, CALCIUM CHLORIDE 600; 310; 30; 20 MG/100ML; MG/100ML; MG/100ML; MG/100ML
INJECTION, SOLUTION INTRAVENOUS CONTINUOUS
Status: DISCONTINUED | OUTPATIENT
Start: 2019-01-01 | End: 2019-01-01 | Stop reason: HOSPADM

## 2019-01-01 RX ORDER — DEXAMETHASONE SODIUM PHOSPHATE 4 MG/ML
10 INJECTION, SOLUTION INTRA-ARTICULAR; INTRALESIONAL; INTRAMUSCULAR; INTRAVENOUS; SOFT TISSUE ONCE
Status: COMPLETED | OUTPATIENT
Start: 2019-01-01 | End: 2019-01-01

## 2019-01-01 RX ORDER — HYDRALAZINE HYDROCHLORIDE 20 MG/ML
5 INJECTION INTRAMUSCULAR; INTRAVENOUS
Status: DISCONTINUED | OUTPATIENT
Start: 2019-01-01 | End: 2019-01-01

## 2019-01-01 RX ORDER — CLARITHROMYCIN 500 MG/1
TABLET, COATED ORAL
COMMUNITY
Start: 2019-01-01 | End: 2019-01-01

## 2019-01-01 RX ORDER — ONDANSETRON 4 MG/1
4 TABLET, ORALLY DISINTEGRATING ORAL EVERY 4 HOURS PRN
Status: DISCONTINUED | OUTPATIENT
Start: 2019-01-01 | End: 2019-01-01 | Stop reason: HOSPADM

## 2019-01-01 RX ORDER — HALOPERIDOL 5 MG/ML
1 INJECTION INTRAMUSCULAR
Status: DISCONTINUED | OUTPATIENT
Start: 2019-01-01 | End: 2019-01-01

## 2019-01-01 RX ORDER — SODIUM CHLORIDE, SODIUM LACTATE, POTASSIUM CHLORIDE, CALCIUM CHLORIDE 600; 310; 30; 20 MG/100ML; MG/100ML; MG/100ML; MG/100ML
INJECTION, SOLUTION INTRAVENOUS CONTINUOUS
Status: DISCONTINUED | OUTPATIENT
Start: 2019-01-01 | End: 2019-01-01

## 2019-01-01 RX ORDER — AMOXICILLIN 500 MG/1
CAPSULE ORAL
COMMUNITY
Start: 2019-01-01 | End: 2019-01-01

## 2019-01-01 RX ORDER — OXYCODONE HCL 5 MG/5 ML
10 SOLUTION, ORAL ORAL
Status: DISCONTINUED | OUTPATIENT
Start: 2019-01-01 | End: 2019-01-01

## 2019-01-01 RX ORDER — ACETAMINOPHEN 500 MG
500-1000 TABLET ORAL EVERY 6 HOURS PRN
COMMUNITY
End: 2019-01-01

## 2019-01-01 RX ORDER — METHYLPREDNISOLONE SODIUM SUCCINATE 40 MG/ML
40 INJECTION, POWDER, LYOPHILIZED, FOR SOLUTION INTRAMUSCULAR; INTRAVENOUS DAILY
Status: DISCONTINUED | OUTPATIENT
Start: 2019-01-01 | End: 2019-01-01

## 2019-01-01 RX ORDER — HYDROMORPHONE HYDROCHLORIDE 1 MG/ML
0.4 INJECTION, SOLUTION INTRAMUSCULAR; INTRAVENOUS; SUBCUTANEOUS
Status: DISCONTINUED | OUTPATIENT
Start: 2019-01-01 | End: 2019-01-01

## 2019-01-01 RX ORDER — DEXAMETHASONE SODIUM PHOSPHATE 4 MG/ML
INJECTION, SOLUTION INTRA-ARTICULAR; INTRALESIONAL; INTRAMUSCULAR; INTRAVENOUS; SOFT TISSUE PRN
Status: DISCONTINUED | OUTPATIENT
Start: 2019-01-01 | End: 2019-01-01 | Stop reason: SURG

## 2019-01-01 RX ORDER — REGADENOSON 0.08 MG/ML
INJECTION, SOLUTION INTRAVENOUS
Status: COMPLETED
Start: 2019-01-01 | End: 2019-01-01

## 2019-01-01 RX ORDER — OMEPRAZOLE 20 MG/1
20 CAPSULE, DELAYED RELEASE ORAL 2 TIMES DAILY
Qty: 84 CAP | Refills: 0 | Status: SHIPPED | OUTPATIENT
Start: 2019-01-01 | End: 2020-01-30

## 2019-01-01 RX ORDER — TAMSULOSIN HYDROCHLORIDE 0.4 MG/1
0.4 CAPSULE ORAL DAILY
Status: DISCONTINUED | OUTPATIENT
Start: 2019-01-01 | End: 2019-01-01 | Stop reason: HOSPADM

## 2019-01-01 RX ORDER — ACETAMINOPHEN 325 MG/1
650 TABLET ORAL EVERY 6 HOURS PRN
Status: DISCONTINUED | OUTPATIENT
Start: 2019-01-01 | End: 2019-01-01 | Stop reason: HOSPADM

## 2019-01-01 RX ORDER — AMOXICILLIN 250 MG
2 CAPSULE ORAL 2 TIMES DAILY
Status: DISCONTINUED | OUTPATIENT
Start: 2019-01-01 | End: 2019-01-01 | Stop reason: HOSPADM

## 2019-01-01 RX ORDER — ONDANSETRON 2 MG/ML
4 INJECTION INTRAMUSCULAR; INTRAVENOUS EVERY 4 HOURS PRN
Status: DISCONTINUED | OUTPATIENT
Start: 2019-01-01 | End: 2019-01-01 | Stop reason: HOSPADM

## 2019-01-01 RX ORDER — SODIUM CHLORIDE 9 MG/ML
INJECTION, SOLUTION INTRAVENOUS CONTINUOUS
Status: DISCONTINUED | OUTPATIENT
Start: 2019-01-01 | End: 2019-01-01 | Stop reason: HOSPADM

## 2019-01-01 RX ORDER — ALBUTEROL SULFATE 90 UG/1
2 AEROSOL, METERED RESPIRATORY (INHALATION) EVERY 4 HOURS PRN
Status: DISCONTINUED | OUTPATIENT
Start: 2019-01-01 | End: 2019-01-01 | Stop reason: HOSPADM

## 2019-01-01 RX ORDER — MIDAZOLAM HYDROCHLORIDE 1 MG/ML
1 INJECTION INTRAMUSCULAR; INTRAVENOUS
Status: DISCONTINUED | OUTPATIENT
Start: 2019-01-01 | End: 2019-01-01

## 2019-01-01 RX ORDER — DEXMEDETOMIDINE HYDROCHLORIDE 100 UG/ML
INJECTION, SOLUTION INTRAVENOUS PRN
Status: DISCONTINUED | OUTPATIENT
Start: 2019-01-01 | End: 2019-01-01 | Stop reason: SURG

## 2019-01-01 RX ORDER — GABAPENTIN 100 MG/1
100 CAPSULE ORAL EVERY EVENING
Status: DISCONTINUED | OUTPATIENT
Start: 2019-01-01 | End: 2019-01-01 | Stop reason: HOSPADM

## 2019-01-01 RX ORDER — ONDANSETRON 2 MG/ML
INJECTION INTRAMUSCULAR; INTRAVENOUS PRN
Status: DISCONTINUED | OUTPATIENT
Start: 2019-01-01 | End: 2019-01-01 | Stop reason: HOSPADM

## 2019-01-01 RX ORDER — AMOXICILLIN 500 MG/1
1000 CAPSULE ORAL 2 TIMES DAILY
Status: ON HOLD | COMMUNITY
Start: 2019-01-01 | End: 2019-01-01

## 2019-01-01 RX ORDER — CYANOCOBALAMIN 1000 UG/ML
1000 INJECTION, SOLUTION INTRAMUSCULAR; SUBCUTANEOUS
Status: COMPLETED | OUTPATIENT
Start: 2019-01-01 | End: 2019-01-01

## 2019-01-01 RX ORDER — BUDESONIDE AND FORMOTEROL FUMARATE DIHYDRATE 80; 4.5 UG/1; UG/1
2 AEROSOL RESPIRATORY (INHALATION) 2 TIMES DAILY
Status: DISCONTINUED | OUTPATIENT
Start: 2019-01-01 | End: 2019-01-01 | Stop reason: HOSPADM

## 2019-01-01 RX ORDER — MEPERIDINE HYDROCHLORIDE 25 MG/ML
12.5 INJECTION INTRAMUSCULAR; INTRAVENOUS; SUBCUTANEOUS
Status: DISCONTINUED | OUTPATIENT
Start: 2019-01-01 | End: 2019-01-01

## 2019-01-01 RX ADMIN — CYANOCOBALAMIN 1000 MCG: 1000 INJECTION, SOLUTION INTRAMUSCULAR; SUBCUTANEOUS at 14:04

## 2019-01-01 RX ADMIN — MIDAZOLAM HYDROCHLORIDE 1 MG: 1 INJECTION, SOLUTION INTRAMUSCULAR; INTRAVENOUS at 07:59

## 2019-01-01 RX ADMIN — OMEPRAZOLE 20 MG: 20 CAPSULE, DELAYED RELEASE ORAL at 18:46

## 2019-01-01 RX ADMIN — ALBUTEROL SULFATE 2 PUFF: 90 AEROSOL, METERED RESPIRATORY (INHALATION) at 20:20

## 2019-01-01 RX ADMIN — METHYLPREDNISOLONE SODIUM SUCCINATE 40 MG: 40 INJECTION, POWDER, FOR SOLUTION INTRAMUSCULAR; INTRAVENOUS at 18:46

## 2019-01-01 RX ADMIN — SODIUM CHLORIDE: 9 INJECTION, SOLUTION INTRAVENOUS at 01:12

## 2019-01-01 RX ADMIN — SODIUM CHLORIDE, POTASSIUM CHLORIDE, SODIUM LACTATE AND CALCIUM CHLORIDE: 600; 310; 30; 20 INJECTION, SOLUTION INTRAVENOUS at 07:32

## 2019-01-01 RX ADMIN — MIDAZOLAM HYDROCHLORIDE 1 MG: 1 INJECTION, SOLUTION INTRAMUSCULAR; INTRAVENOUS at 08:11

## 2019-01-01 RX ADMIN — CYANOCOBALAMIN 1000 MCG: 1000 INJECTION, SOLUTION INTRAMUSCULAR; SUBCUTANEOUS at 11:46

## 2019-01-01 RX ADMIN — ONDANSETRON 4 MG: 2 INJECTION INTRAMUSCULAR; INTRAVENOUS at 08:13

## 2019-01-01 RX ADMIN — CYANOCOBALAMIN 1000 MCG: 1000 INJECTION, SOLUTION INTRAMUSCULAR; SUBCUTANEOUS at 09:46

## 2019-01-01 RX ADMIN — PROPOFOL 200 MCG/KG/MIN: 10 INJECTION, EMULSION INTRAVENOUS at 07:59

## 2019-01-01 RX ADMIN — DEXAMETHASONE SODIUM PHOSPHATE 10 MG: 4 INJECTION, SOLUTION INTRA-ARTICULAR; INTRALESIONAL; INTRAMUSCULAR; INTRAVENOUS; SOFT TISSUE at 13:13

## 2019-01-01 RX ADMIN — DIPHENHYDRAMINE HYDROCHLORIDE 25 MG: 50 INJECTION INTRAMUSCULAR; INTRAVENOUS at 13:13

## 2019-01-01 RX ADMIN — PROPOFOL 40 MG: 10 INJECTION, EMULSION INTRAVENOUS at 07:59

## 2019-01-01 RX ADMIN — DIPHENHYDRAMINE HYDROCHLORIDE 25 MG: 25 TABLET ORAL at 18:46

## 2019-01-01 RX ADMIN — CYANOCOBALAMIN 1000 MCG: 1000 INJECTION, SOLUTION INTRAMUSCULAR; SUBCUTANEOUS at 11:39

## 2019-01-01 RX ADMIN — ONDANSETRON 4 MG: 2 INJECTION INTRAMUSCULAR; INTRAVENOUS at 20:23

## 2019-01-01 RX ADMIN — DEXAMETHASONE SODIUM PHOSPHATE 4 MG: 4 INJECTION, SOLUTION INTRAMUSCULAR; INTRAVENOUS at 08:13

## 2019-01-01 RX ADMIN — METHYLPREDNISOLONE SODIUM SUCCINATE 40 MG: 40 INJECTION, POWDER, FOR SOLUTION INTRAMUSCULAR; INTRAVENOUS at 04:54

## 2019-01-01 RX ADMIN — SODIUM BICARBONATE TAB 325 MG 325 MG: 325 TAB at 20:20

## 2019-01-01 RX ADMIN — TAMSULOSIN HYDROCHLORIDE 0.4 MG: 0.4 CAPSULE ORAL at 04:53

## 2019-01-01 RX ADMIN — BUDESONIDE AND FORMOTEROL FUMARATE DIHYDRATE 2 PUFF: 80; 4.5 AEROSOL RESPIRATORY (INHALATION) at 18:51

## 2019-01-01 RX ADMIN — CYANOCOBALAMIN 1000 MCG: 1000 INJECTION, SOLUTION INTRAMUSCULAR; SUBCUTANEOUS at 13:40

## 2019-01-01 RX ADMIN — ALBUTEROL SULFATE 2 PUFF: 90 AEROSOL, METERED RESPIRATORY (INHALATION) at 04:59

## 2019-01-01 RX ADMIN — FENTANYL CITRATE 50 MCG: 50 INJECTION, SOLUTION INTRAMUSCULAR; INTRAVENOUS at 07:59

## 2019-01-01 RX ADMIN — SODIUM CHLORIDE: 9 INJECTION, SOLUTION INTRAVENOUS at 20:16

## 2019-01-01 RX ADMIN — CYANOCOBALAMIN 1000 MCG: 1000 INJECTION, SOLUTION INTRAMUSCULAR; SUBCUTANEOUS at 10:34

## 2019-01-01 RX ADMIN — SODIUM CHLORIDE, POTASSIUM CHLORIDE, SODIUM LACTATE AND CALCIUM CHLORIDE: 600; 310; 30; 20 INJECTION, SOLUTION INTRAVENOUS at 06:30

## 2019-01-01 RX ADMIN — ACETAMINOPHEN 650 MG: 325 TABLET, FILM COATED ORAL at 20:16

## 2019-01-01 RX ADMIN — OMEPRAZOLE 20 MG: 20 CAPSULE, DELAYED RELEASE ORAL at 04:53

## 2019-01-01 RX ADMIN — SODIUM BICARBONATE TAB 325 MG 325 MG: 325 TAB at 04:53

## 2019-01-01 RX ADMIN — REGADENOSON 0.4 MG: 0.08 INJECTION, SOLUTION INTRAVENOUS at 08:52

## 2019-01-01 RX ADMIN — CYANOCOBALAMIN 1000 MCG: 1000 INJECTION, SOLUTION INTRAMUSCULAR; SUBCUTANEOUS at 10:16

## 2019-01-01 RX ADMIN — GLYCOPYRROLATE 0.2 MG: 0.2 INJECTION INTRAMUSCULAR; INTRAVENOUS at 07:59

## 2019-01-01 RX ADMIN — ALBUTEROL SULFATE 2 PUFF: 90 AEROSOL, METERED RESPIRATORY (INHALATION) at 01:11

## 2019-01-01 RX ADMIN — DIPHENHYDRAMINE HYDROCHLORIDE 25 MG: 25 TABLET ORAL at 04:53

## 2019-01-01 RX ADMIN — EPINEPHRINE 12.5 MCG: 1 INJECTION, SOLUTION, CONCENTRATE INTRAVENOUS at 08:25

## 2019-01-01 RX ADMIN — SODIUM CHLORIDE, POTASSIUM CHLORIDE, SODIUM LACTATE AND CALCIUM CHLORIDE: 600; 310; 30; 20 INJECTION, SOLUTION INTRAVENOUS at 07:34

## 2019-01-01 RX ADMIN — ALBUTEROL SULFATE 2 PUFF: 90 AEROSOL, METERED RESPIRATORY (INHALATION) at 04:03

## 2019-01-01 RX ADMIN — PHENYLEPHRINE HYDROCHLORIDE 250 MCG: 10 INJECTION INTRAVENOUS at 08:25

## 2019-01-01 RX ADMIN — GABAPENTIN 100 MG: 100 CAPSULE ORAL at 18:51

## 2019-01-01 RX ADMIN — DEXMEDETOMIDINE HYDROCHLORIDE 15 MCG: 100 INJECTION, SOLUTION INTRAVENOUS at 08:13

## 2019-01-01 RX ADMIN — DIPHENHYDRAMINE HYDROCHLORIDE 25 MG: 25 TABLET ORAL at 06:31

## 2019-01-01 RX ADMIN — ALBUTEROL SULFATE 2 PUFF: 90 AEROSOL, METERED RESPIRATORY (INHALATION) at 13:18

## 2019-01-01 RX ADMIN — LIDOCAINE HYDROCHLORIDE 40 MG: 20 INJECTION, SOLUTION INFILTRATION; PERINEURAL at 07:59

## 2019-01-01 RX ADMIN — SODIUM CHLORIDE: 9 INJECTION, SOLUTION INTRAVENOUS at 14:30

## 2019-01-01 RX ADMIN — DIPHENHYDRAMINE HYDROCHLORIDE 25 MG: 25 TABLET ORAL at 18:51

## 2019-01-01 RX ADMIN — FENTANYL CITRATE 50 MCG: 50 INJECTION, SOLUTION INTRAMUSCULAR; INTRAVENOUS at 08:11

## 2019-01-01 RX ADMIN — TAMSULOSIN HYDROCHLORIDE 0.4 MG: 0.4 CAPSULE ORAL at 06:30

## 2019-01-01 RX ADMIN — GABAPENTIN 100 MG: 100 CAPSULE ORAL at 18:46

## 2019-01-01 RX ADMIN — BUDESONIDE AND FORMOTEROL FUMARATE DIHYDRATE 2 PUFF: 80; 4.5 AEROSOL RESPIRATORY (INHALATION) at 04:54

## 2019-01-01 RX ADMIN — METHYLPREDNISOLONE SODIUM SUCCINATE 40 MG: 40 INJECTION, POWDER, FOR SOLUTION INTRAMUSCULAR; INTRAVENOUS at 06:30

## 2019-01-01 SDOH — HEALTH STABILITY: MENTAL HEALTH: HOW OFTEN DO YOU HAVE 6 OR MORE DRINKS ON ONE OCCASION?: NEVER

## 2019-01-01 ASSESSMENT — ENCOUNTER SYMPTOMS
DIARRHEA: 0
ROS GI COMMENTS: POOR APPETITE
SHORTNESS OF BREATH: 1
TREMORS: 0
NAUSEA: 0
COUGH: 0
MYALGIAS: 0
FALLS: 1
BRUISES/BLEEDS EASILY: 0
SPEECH CHANGE: 0
FEVER: 0
SENSORY CHANGE: 0
ABDOMINAL PAIN: 0
SHORTNESS OF BREATH: 0
EYES NEGATIVE: 1
SORE THROAT: 0
ORTHOPNEA: 0
SPEECH CHANGE: 0
VOMITING: 1
FEVER: 0
MYALGIAS: 0
TINGLING: 1
DIARRHEA: 0
RESPIRATORY NEGATIVE: 1
ABDOMINAL PAIN: 0
WEAKNESS: 1
DIZZINESS: 0
CARDIOVASCULAR NEGATIVE: 1
EYE DISCHARGE: 0
PND: 0
BLURRED VISION: 0
ABDOMINAL PAIN: 1
NAUSEA: 0
MYALGIAS: 0
PALPITATIONS: 0
DOUBLE VISION: 0
CLAUDICATION: 0
SHORTNESS OF BREATH: 1
CONSTIPATION: 0
BLURRED VISION: 0
PALPITATIONS: 0
FEVER: 0
HEARTBURN: 0
BACK PAIN: 0
CONSTIPATION: 0
PALPITATIONS: 0
NECK PAIN: 0
SENSORY CHANGE: 0
DIZZINESS: 0
FOCAL WEAKNESS: 1
DEPRESSION: 0
SPUTUM PRODUCTION: 0
SHORTNESS OF BREATH: 1
BLOOD IN STOOL: 0
GENERAL WELL-BEING: FAIR
STRIDOR: 0
DIZZINESS: 0
TINGLING: 0
WHEEZING: 1
TINGLING: 0
HEADACHES: 0
BACK PAIN: 1
VOMITING: 0
CONSTITUTIONAL NEGATIVE: 1
PHOTOPHOBIA: 0
PND: 0
NAUSEA: 0
WEIGHT LOSS: 0
CONSTIPATION: 0
FOCAL WEAKNESS: 0
DIZZINESS: 0
HEADACHES: 0
HALLUCINATIONS: 0
NERVOUS/ANXIOUS: 0
NAUSEA: 0
CHILLS: 0
WEAKNESS: 1
DOUBLE VISION: 0
WEIGHT LOSS: 1
ORTHOPNEA: 0
SENSORY CHANGE: 1
HEADACHES: 0
BLURRED VISION: 0
PALPITATIONS: 0
TREMORS: 0
DIARRHEA: 0
EYE PAIN: 0
CHILLS: 0
CHILLS: 0
COUGH: 0
WEIGHT LOSS: 1
FOCAL WEAKNESS: 0
PSYCHIATRIC NEGATIVE: 1

## 2019-01-01 ASSESSMENT — LIFESTYLE VARIABLES
HOW MANY TIMES IN THE PAST YEAR HAVE YOU HAD 5 OR MORE DRINKS IN A DAY: 0
TOTAL SCORE: 0
EVER FELT BAD OR GUILTY ABOUT YOUR DRINKING: NO
ON A TYPICAL DAY WHEN YOU DRINK ALCOHOL HOW MANY DRINKS DO YOU HAVE: 0
HAVE PEOPLE ANNOYED YOU BY CRITICIZING YOUR DRINKING: NO
EVER_SMOKED: NEVER
HAVE YOU EVER FELT YOU SHOULD CUT DOWN ON YOUR DRINKING: NO
EVER HAD A DRINK FIRST THING IN THE MORNING TO STEADY YOUR NERVES TO GET RID OF A HANGOVER: NO
TOTAL SCORE: 0
DOES PATIENT WANT TO STOP DRINKING: NO
ALCOHOL_USE: NO
TOTAL SCORE: 0
CONSUMPTION TOTAL: NEGATIVE
DO YOU DRINK ALCOHOL: NO
AVERAGE NUMBER OF DAYS PER WEEK YOU HAVE A DRINK CONTAINING ALCOHOL: 0
SUBSTANCE_ABUSE: 0
EVER_SMOKED: NEVER

## 2019-01-01 ASSESSMENT — PATIENT HEALTH QUESTIONNAIRE - PHQ9
SUM OF ALL RESPONSES TO PHQ9 QUESTIONS 1 AND 2: 0
SUM OF ALL RESPONSES TO PHQ9 QUESTIONS 1 AND 2: 0
CLINICAL INTERPRETATION OF PHQ2 SCORE: 0
2. FEELING DOWN, DEPRESSED, IRRITABLE, OR HOPELESS: NOT AT ALL
1. LITTLE INTEREST OR PLEASURE IN DOING THINGS: NOT AT ALL
5. POOR APPETITE OR OVEREATING: 2 - MORE THAN HALF THE DAYS
SUM OF ALL RESPONSES TO PHQ QUESTIONS 1-9: 15
1. LITTLE INTEREST OR PLEASURE IN DOING THINGS: NOT AT ALL
2. FEELING DOWN, DEPRESSED, IRRITABLE, OR HOPELESS: NOT AT ALL
SUM OF ALL RESPONSES TO PHQ9 QUESTIONS 1 AND 2: 0
1. LITTLE INTEREST OR PLEASURE IN DOING THINGS: NOT AT ALL
CLINICAL INTERPRETATION OF PHQ2 SCORE: 2

## 2019-01-01 ASSESSMENT — GAIT ASSESSMENTS
ASSISTIVE DEVICE: 4 WHEEL WALKER
DISTANCE (FEET): 200
GAIT LEVEL OF ASSIST: SUPERVISED

## 2019-01-01 ASSESSMENT — COPD QUESTIONNAIRES
DO YOU EVER COUGH UP ANY MUCUS OR PHLEGM?: NO/ONLY WITH OCCASIONAL COLDS OR INFECTIONS
COPD SCREENING SCORE: 2
HAVE YOU SMOKED AT LEAST 100 CIGARETTES IN YOUR ENTIRE LIFE: NO/DON'T KNOW
DURING THE PAST 4 WEEKS HOW MUCH DID YOU FEEL SHORT OF BREATH: NONE/LITTLE OF THE TIME

## 2019-01-01 ASSESSMENT — PAIN SCALES - WONG BAKER
WONGBAKER_NUMERICALRESPONSE: DOESN'T HURT AT ALL

## 2019-01-01 ASSESSMENT — COGNITIVE AND FUNCTIONAL STATUS - GENERAL
CLIMB 3 TO 5 STEPS WITH RAILING: A LITTLE
MOBILITY SCORE: 23
SUGGESTED CMS G CODE MODIFIER MOBILITY: CI

## 2019-01-01 ASSESSMENT — ACTIVITIES OF DAILY LIVING (ADL)
BATHING_REQUIRES_ASSISTANCE: 0
TRANSPORTATION COMMENTS: WIFE DRIVES

## 2019-01-01 ASSESSMENT — PAIN SCALES - GENERAL: PAIN_LEVEL: 0

## 2019-02-01 NOTE — TELEPHONE ENCOUNTER
Future Appointments       Provider Department Center    2/5/2019 11:15 AM Varun Scott M.D. [facility]    3/8/2019 10:15 AM Varun Scott M.D.; [facility]  [facility]        ESTABLISHED PATIENT PRE-VISIT PLANNING     Patient was NOT contacted to complete PVP.       1.  Reviewed notes from the last few office visits within the medical group: Yes    2.  If any orders were placed at last visit or intended to be done for this visit (i.e. 6 mos follow-up), do we have Results/Consult Notes?        •  Labs - Labs ordered, completed on 09/11/2018 and results are in chart.       •  Imaging - Imaging was not ordered at last office visit.       •  Referrals - No referrals were ordered at last office visit.    3. Is this appointment scheduled as a Hospital Follow-Up? No    4.  Immunizations were updated in Epic using WebIZ?: Yes       •  Web Iz Recommendations: TD and SHINGRIX (Shingles)    5.  Patient is due for the following Health Maintenance Topics:   There are no preventive care reminders to display for this patient.    6. Orders for overdue Health Maintenance topics pended in Pre-Charting? NO    7.  AHA (MDX) form printed for Provider? YES    8.  Patient was NOT informed to arrive 15 min prior to their scheduled appointment and bring in their medication bottles.

## 2019-03-12 NOTE — TELEPHONE ENCOUNTER
Future Appointments       Provider Department Center    3/15/2019 12:55 PM Varun Scott M.D. [facility]        ESTABLISHED PATIENT PRE-VISIT PLANNING     Patient was NOT contacted to complete PVP.       1.  Reviewed notes from the last few office visits within the medical group: Yes    2.  If any orders were placed at last visit or intended to be done for this visit (i.e. 6 mos follow-up), do we have Results/Consult Notes?        •  Labs - Labs ordered, completed on 09/11/2019 and results are in chart.       •  Imaging - Imaging was not ordered at last office visit.       •  Referrals - No referrals were ordered at last office visit.    3. Is this appointment scheduled as a Hospital Follow-Up? No    4.  Immunizations were updated in [employer] using WebIZ?: Yes       •  Web Iz Recommendations: TD, VARICELLA (Chicken Pox)  and SHINGRIX (Shingles)    5.  Patient is due for the following Health Maintenance Topics:   Health Maintenance Due   Topic Date Due   • Annual Wellness Visit  03/03/2019     6. Orders for overdue Health Maintenance topics pended in Pre-Charting? YES    7.  AHA (MDX) form printed for Provider? YES    8.  Patient was NOT informed to arrive 15 min prior to their scheduled appointment and bring in their medication bottles.

## 2019-03-15 PROBLEM — R53.83 FATIGUE: Status: ACTIVE | Noted: 2019-01-01

## 2019-03-15 PROBLEM — E53.8 LOW SERUM VITAMIN B12: Status: ACTIVE | Noted: 2019-01-01

## 2019-03-15 NOTE — PATIENT INSTRUCTIONS
Today, your Healthcare Provider may have discussed the following recommendations:    1. Exercise and Physical Activity  According to the American Heart Association, it is recommended to engage in physical activity regularly and to aim for 150 minutes of moderate-intensity aerobic activity per week.  Your Healthcare Provider may have recommended taking the stairs instead of the elevator, starting or maintaining a walking program or strength-training program.    2. Emotional Well-being  Mental and emotional well-being is essential to overall health.  Your Healthcare Provider may have encouraged you to build strong, positive relationships with family and friends, become more involved in your community (by volunteering or joining a spiritual community), or focus on self-care.    3. Fall and Injury Prevention  To prevent falls and injuries and also improve your balance, your Healthcare Provider may have suggested that you use a cane or walker, start an exercise of physical therapy program, or have your vision and/or hearing tested.    4. Urinary Leakage (Urinary Incontinence)  To control or manage the leakage of urine, your Healthcare Provider may have recommended you start bladder training exercises (such as Kegel exercises), a trial of a medication or a referral to see a specialist to discuss surgical options.      · You will be receiving monthly B12 injections for 6 months and then have your B12 level checked in the lab in September

## 2019-03-15 NOTE — ASSESSMENT & PLAN NOTE
Shawanda has been having a lot of fatigue, just wants to sleep all the time. Also started having tingling in his feet. Had a B12 of 103 as of 9/2018 but hasn't been treated for this yet.

## 2019-03-15 NOTE — PROGRESS NOTES
Annual Health Assessment Questions:    1.  Are you currently engaging in any exercise or physical activity? No    2.  How would you describe your mood or emotional well-being today? Okay    3.  Have you had any falls in the last year? No    4.  Have you noticed any problems with your balance or had difficulty walking? No    5.  In the last six months have you experienced any leakage of urine? No    6. DPA/Advanced Directive: Patient has Advanced Directive, but it is not on file. Instructed to bring in a copy to scan into their chart.     PRIMARY CARE CLINIC NEW PATIENT H&P  Chief Complaint   Patient presents with   • Other     low B12      History of Present Illness     BPH (benign prostatic hyperplasia)  On flomax.     Fatigue  Shawanda has been having a lot of fatigue, just wants to sleep all the time. Also started having tingling in his feet. Had a B12 of 103 as of 9/2018 but hasn't been treated for this yet.     IGT (impaired glucose tolerance)  A1c was normal as of 9/2018.     Current Outpatient Prescriptions   Medication Sig Dispense Refill   • tamsulosin (FLOMAX) 0.4 MG capsule        Current Facility-Administered Medications   Medication Dose Route Frequency Provider Last Rate Last Dose   • cyanocobalamin (VITAMIN B-12) injection 1,000 mcg  1,000 mcg Intramuscular Q30 DAYS Varun Scott M.D.         Past Medical History:   Diagnosis Date   • BPH (benign prostatic hyperplasia) 3/4/2016   • Cataract     fabiola lens surgery   • Low serum vitamin B12 3/15/2019   • Renal disorder 2015    kidney stone     Past Surgical History:   Procedure Laterality Date   • BREAST BIOPSY Left 11/18/2016    Procedure: BREAST BIOPSY;  Surgeon: Adrianne Dobbins M.D.;  Location: SURGERY [facility];  Service:    • INGUINAL HERNIA REPAIR ROBOTIC Bilateral 8/10/2016    Procedure: INGUINAL HERNIA REPAIR ROBOTIC with mesh  ;  Surgeon: Francis Fletcher M.D.;  Location: SURGERY [facility];  Service:    • OTHER  2000    anal cyst  "    Social History   Substance Use Topics   • Smoking status: Never Smoker   • Smokeless tobacco: Never Used   • Alcohol use 0.0 oz/week      Comment: 1-2 glasses of wine per month     Social History     Social History Narrative    Retired       Family History   Problem Relation Age of Onset   • Other Mother         old age   • Heart Disease Father    • Stroke Father 86         from stroke   • Colon Cancer Father         colon cancer   • Cancer Maternal Uncle         prostate     Family Status   Relation Status   • Mo    • Fa    • MUnc    • Sis    • Bro    • Bro Alive     Allergies: Patient has no known allergies.    ROS  Constitutional: Positive for fatigue/generalized weakness.   HEENT: Negative for  vision changes, hearing changes    Respiratory: Negative for shortness of breath  Cardiovascular: Negative for chest pain, palpitations  Gastrointestinal: Negative for blood in stool, constipation, diarrhea  Genitourinary: Negative for dysuria, polyuria  Musculoskeletal: Negative for myalgias, back pain, and joint pain.   Skin: Negative for rash  Neurological: Positive for numbness, tingling  Psychiatric/Behavioral: Negative for depression, anxiety       Objective   Blood pressure (!) 180/90, pulse 63, temperature 36.6 °C (97.9 °F), resp. rate 16, height 1.791 m (5' 10.5\"), weight 75.3 kg (166 lb), SpO2 100 %. Body mass index is 23.48 kg/m².    General: Alert, oriented. In no acute distress   HEET: EOMI, PERRL, conjunctiva non-injected, sclera non-icteric.  Nares patent with no significant congestion or drainage.  Adamaris pinnae, external auditory canals, TM pearly gray with normal light reflex bilaterally.Oral mucous membranes pink and moist with no lesions.  Neck: supple with no cervical, subclavicular lymphadenopathy, JVD, palpable thyroid nodules   Lungs: clear to auscultation bilaterally with good excursion.  CV: regular rate and rhythm.  Abdomen soft, " non-distended, non-tender with normal bowel sounds. No hepatosplenomegaly, no masses palpated  Skin: no lesions. Warm, dry   Psychiatric: appropriate mood and affect     Assessment and Plan   The following treatment plan was discussed     1. Benign prostatic hyperplasia, unspecified whether lower urinary tract symptoms present  Continue flomax.     2. Other fatigue  B12 very low at 103 when checked 9/2018, will start B12 monthly injections and recheck after 6 months.     3. Low serum vitamin B12  See above.   - cyanocobalamin (VITAMIN B-12) injection 1,000 mcg; 1 mL by Intramuscular route every 30 days.  - VITAMIN B12; Future    4. IGT (impaired glucose tolerance)  A1c was normal 9/2018, will recheck fasting sugar with annual labs 9/2019.     5. Screening for hyperlipidemia  - Comp Metabolic Panel; Future  - CBC WITH DIFFERENTIAL; Future  - Lipid Profile; Future    6. Screening for endocrine disorder  - TSH WITH REFLEX TO FT4; Future  - VITAMIN D,25 HYDROXY; Future    Return in about 6 months (around 9/15/2019).    Health Maintenance      Health Maintenance Due   Topic Date Due   • Annual Wellness Visit  03/03/2019       Varun Scott MD  Internal Medicine  [facility]

## 2019-03-19 NOTE — TELEPHONE ENCOUNTER
Future Appointments       Provider Department Center    3/22/2019 1:15 PM Varun Scott M.D.; [facility]  [facility]    9/16/2019 10:55 AM Varun Scott M.D. [facility]        ANNUAL WELLNESS VISIT PRE-VISIT PLANNING WITHOUT OUTREACH    1.  Reviewed note from last office visit with PCP: YES    2.  If any orders were placed at last visit, do we have Results/Consult Notes?        •  Labs - Labs ordered, NOT completed. Patient advised to complete prior to next appointment.        •  Imaging - Imaging was not ordered at last office visit.       •  Referrals - No referrals were ordered at last office visit.    3.  Immunizations were updated in [place] using WebIZ?: Yes       •  WebIZ Recommendations: TD, SHINGRIX (Shingles) and CPOX        •  Is patient due for Tdap? NO       •  Is patient due for Shingrix? YES. Patient was not notified of copay/out of pocket cost.     4.  Patient is due for the following Health Maintenance Topics:   Health Maintenance Due   Topic Date Due   • Annual Wellness Visit  03/03/2019     5.  Reviewed/Updated the following with patient:       •   Preferred Pharmacy? YES       •   Preferred Lab? YES       •   Preferred Communication? YES       •   Allergies? YES       •   Medications? YES. Was Abstract Encounter opened and chart updated? YES       •   Social History? YES. Was Abstract Encounter opened and chart updated? YES       •   Family History (document living status of immediate family members and if + hx of  cancer, diabetes, hypertension, hyperlipidemia, heart attack, stroke) YES. Was Abstract Encounter opened and chart updated? YES    6.  Care Team Updated:       •   DME Company (gait device, O2, CPAP, etc.): NO       •   Other Specialists (eye doctor, derm, GYN, cardiology, endo, etc): YES    7. Orders for overdue Health Maintenance topics pended in Pre-Charting? NO    8.  Patient has the following Care Path diagnoses on  Problem List:  NONE    9.  Patient was advised: “This is a free wellness visit. The provider will screen for medical conditions to help you stay healthy. If you have other concerns to address you may be asked to discuss these at a separate visit or there may be an additional fee.”     10.  Patient was informed to arrive 15 min prior to their scheduled appointment and bring in their medication bottles.

## 2019-03-22 PROBLEM — F32.A DEPRESSION: Status: ACTIVE | Noted: 2019-01-01

## 2019-03-22 NOTE — PATIENT INSTRUCTIONS
Today, your Healthcare Provider may have discussed the following recommendations:    1. Exercise and Physical Activity  According to the American Heart Association, it is recommended to engage in physical activity regularly and to aim for 150 minutes of moderate-intensity aerobic activity per week.  Your Healthcare Provider may have recommended taking the stairs instead of the elevator, starting or maintaining a walking program or strength-training program.    2. Emotional Well-being  Mental and emotional well-being is essential to overall health.  Your Healthcare Provider may have encouraged you to build strong, positive relationships with family and friends, become more involved in your community (by volunteering or joining a spiritual community), or focus on self-care.    3. Fall and Injury Prevention  To prevent falls and injuries and also improve your balance, your Healthcare Provider may have suggested that you use a cane or walker, start an exercise of physical therapy program, or have your vision and/or hearing tested.    4. Urinary Leakage (Urinary Incontinence)  To control or manage the leakage of urine, your Healthcare Provider may have recommended you start bladder training exercises (such as Kegel exercises), a trial of a medication or a referral to see a specialist to discuss surgical options.    Sleep Hygiene Tips - Research & Treatments  From American Sleep Association    Getting good sleep is important in maintaining health. There are several things that you can do to promote good sleep and sleep hygiene, and ultimately get better sleep.  What is sleep hygiene?  Sleep hygiene is defined as behaviors that one can do to help promote good sleep using behavioral interventions.    TIPS:     Maintain a regular sleep routine  · Go to bed at the same time. Wake up at the same time. Ideally, your schedule will remain the same (+/- 20 minutes) every night of the week.  Avoid naps if possible  Naps decrease  the ‘Sleep Debt’ that is so necessary for easy sleep onset.  Each of us needs a certain amount of sleep per 24-hour period. We need that amount, and we don’t need more than that.When we take naps, it decreases the amount of sleep that we need the next night - which may cause sleep fragmentation and difficulty initiating sleep, and may lead to insomnia.  Don’t stay in bed awake for more than 5-10 minutes.  If you find your mind racing, or worrying about not being able to sleep during the middle of the night, get out of bed, and sit in a chair in the dark. Do your mind racing in the chair until you are sleepy, then return to bed. No TV or internet during these periods! That will just stimulate you more than desired.            If this happens several times during the night, that is OK. Just maintain your           regular wake time, and try to avoid naps.  Don’t watch TV or read in bed.  When you watch TV or read in bed, you associate the bed with wakefulness.  Drink caffeinated drinks with caution   · The effects of caffeine may last for several hours after ingestion. Caffeine can fragment sleep, and cause difficulty initiating sleep. If you drink caffeine, use it only before noon.   Remember that soda and tea contain caffeine as well.  Avoid inappropriate substances that interfere with sleep       Cigarettes, alcohol, and over-the-counter medications may cause fragmented sleep.  Exercise regularly  Exercise before 2 pm every day. Exercise promotes continuous sleep.  · Avoid rigorous exercise before bedtime. Rigorous exercise circulates endorphins into the body which may cause difficulty initiating sleep.   Have a quiet, comfortable bedroom      Set your bedroom thermostat at a comfortable temperature. Generally, a little cooler        is better than a little warmer. Turn off the TV and other extraneous noise that may disrupt sleep. Background ‘white noise’ like a fan is Ok. If your pets awaken you, keep them outside  the bedroom. Your bedroom should be dark. Turn off bright lights.  Have a comfortable mattress.  If you are a ‘clock watcher’ at night, hide the clock.  Have a comfortable pre-bedtime routine  A warm bath, shower  Meditation, or quiet time  Some who are struggling with sleep regularly find it helpful to print out these recommendations and read them regularly. If you accidentally miss some of recommendations, or have a bad night, do not fret. By following these sleep hygiene recommendations, you will help yourself to get into a routine that promotes good sleep opportunities.

## 2019-03-22 NOTE — PROGRESS NOTES
Chief Complaint   Patient presents with   • Annual Wellness Visit         HPI:  Shawanda is a 83 y.o. here for Medicare Annual Wellness Visit        Patient Active Problem List    Diagnosis Date Noted   • Fatigue 03/15/2019   • Low serum vitamin B12 03/15/2019   • IGT (impaired glucose tolerance) 09/15/2016   • BPH (benign prostatic hyperplasia) 03/04/2016   • History of kidney stones 03/04/2016       Current Outpatient Prescriptions   Medication Sig Dispense Refill   • acetaminophen (TYLENOL) 500 MG Tab Take 500-1,000 mg by mouth every 6 hours as needed.     • tamsulosin (FLOMAX) 0.4 MG capsule        Current Facility-Administered Medications   Medication Dose Route Frequency Provider Last Rate Last Dose   • cyanocobalamin (VITAMIN B-12) injection 1,000 mcg  1,000 mcg Intramuscular Q30 DAYS Varun Scott M.D.   1,000 mcg at 03/15/19 1340        Patient is taking medications as noted in medication list.  Current supplements as per medication list.     Allergies: Patient has no known allergies.    Current social contact/activities: Visiting with friends and family,    Is patient current with immunizations? Yes.    He  reports that he has never smoked. He has never used smokeless tobacco. He reports that he drinks alcohol. He reports that he does not use drugs.  Counseling given: Not Answered        DPA/Advanced directive: Patient has Living Will on file.     ROS:    Gait: Uses a cane- started using it today   Ostomy: No   Other tubes: No   Amputations: No   Chronic oxygen use No   Last eye exam 2 weeks ago  Wears hearing aids: No   : Denies any urinary leakage during the last 6 months  Annual Health Assessment Questions:    1.  Are you currently engaging in any exercise or physical activity? No    2.  How would you describe your mood or emotional well-being today? fair    3.  Have you had any falls in the last year? No    4.  Have you noticed any problems with your balance or had difficulty walking? No    5.  In the  last six months have you experienced any leakage of urine? No    6. DPA/Advanced Directive: Patient has Living Will on file.     Screening:        Depression Screening    Little interest or pleasure in doing things?  2 - more than half the days  Feeling down, depressed, or hopeless? 0 - not at all  Trouble falling or staying asleep, or sleeping too much?  2 - more than half the days  Feeling tired or having little energy?  3 - nearly every day  Poor appetite or overeating?  2 - more than half the days  Feeling bad about yourself - or that you are a failure or have let yourself or your family down? 0 - not at all  Trouble concentrating on things, such as reading the newspaper or watching television? 3 - nearly every day  Moving or speaking so slowly that other people could have noticed.  Or the opposite - being so fidgety or restless that you have been moving around a lot more than usual?  3 - nearly every day  Thoughts that you would be better off dead, or of hurting yourself?  0 - not at all  Patient Health Questionnaire Score: 15      If depressive symptoms identified deferred to follow up visit unless specifically addressed in assessment and plan.    Interpretation of PHQ-9 Total Score   Score Severity   1-4 No Depression   5-9 Mild Depression   10-14 Moderate Depression   15-19 Moderately Severe Depression   20-27 Severe Depression      Screening for Cognitive Impairment    Three Minute Recall (leader, season, table)  3/3 Leader, season, table  Draw clock face with all 12 numbers and set the hands to show 10 past 11.  Yes 11:10 5/5  If cognitive concerns identified, deferred for follow up unless specifically addressed in assessment and plan.    Fall Risk Assessment    Has the patient had two or more falls in the last year or any fall with injury in the last year?  No  If fall risk identified, deferred for follow up unless specifically addressed in assessment and plan.      Safety Assessment    Throw rugs on  floor.  Yes  Handrails on all stairs.  Yes  Good lighting in all hallways.  Yes  Difficulty hearing.  No  Patient counseled about all safety risks that were identified.    Functional Assessment ADLs    Are there any barriers preventing you from cooking for yourself or meeting nutritional needs?  No.    Are there any barriers preventing you from driving safely or obtaining transportation?  Yes. Wife drives  Are there any barriers preventing you from using a telephone or calling for help?  No.    Are there any barriers preventing you from shopping?  No.    Are there any barriers preventing you from taking care of your own finances?  No.    Are there any barriers preventing you from managing your medications?    No.    Are there any barriers preventing you from showering, bathing or dressing yourself?  No.    Are you currently engaging in any exercise or physical activity?  Yes.  Walking  What is your perception of your health?  Fair.    Health Maintenance Summary                Annual Wellness Visit Overdue 3/3/2019      Done 3/2/2018 Visit Dx: Medicare annual wellness visit, subsequent     Patient has more history with this topic...    IMM ZOSTER VACCINES Postponed 9/21/2033 Originally 4/19/2011. Insurance/Financial     Done 2/22/2011 Imm Admin: Zoster Vaccine Live (ZVL) (Zostavax)    COLONOSCOPY Next Due 11/13/2023      Done 11/13/2018 REFERRAL TO GI FOR COLONOSCOPY    IMM DTaP/Tdap/Td Vaccine Next Due 10/12/2028      Done 10/12/2018 Imm Admin: Tdap Vaccine          Patient Care Team:  Varun Scott M.D. as PCP - General (Internal Medicine)  Luis Mckeon M.D. as Consulting Physician (Urology)  Dontrell Alfonso M.D. as Consulting Physician (Ophthalmology)      Social History   Substance Use Topics   • Smoking status: Never Smoker   • Smokeless tobacco: Never Used   • Alcohol use 0.0 oz/week      Comment: 0-2 glasses of wine per month     Family History   Problem Relation Age of Onset   • Other Mother         old  "age   • Heart Disease Father    • Stroke Father 86         from stroke   • Colon Cancer Father         colon cancer   • Cancer Maternal Uncle         prostate   • Alzheimer's Disease Sister    • Cancer Brother         LUNG CANCER     He  has a past medical history of BPH (benign prostatic hyperplasia) (3/4/2016); Cataract; Low serum vitamin B12 (3/15/2019); and Renal disorder ().   Past Surgical History:   Procedure Laterality Date   • BREAST BIOPSY Left 2016    Procedure: BREAST BIOPSY;  Surgeon: Adrianne Dobbins M.D.;  Location: SURGERY [facility];  Service:    • INGUINAL HERNIA REPAIR ROBOTIC Bilateral 8/10/2016    Procedure: INGUINAL HERNIA REPAIR ROBOTIC with mesh  ;  Surgeon: Francis Fletcher M.D.;  Location: SURGERY [facility];  Service:    • OTHER      anal cyst         Exam:     Blood pressure 140/62, pulse (!) 53, temperature 36.7 °C (98 °F), temperature source Temporal, resp. rate 16, height 1.791 m (5' 10.51\"), weight 74.9 kg (165 lb 3.2 oz), SpO2 100 %. Body mass index is 23.36 kg/m².    Hearing fair.    Dentition fair  Alert, oriented in no acute distress.  Eye contact is good, speech goal directed, affect calm      Assessment and Plan. The following treatment and monitoring plan is recommended:    BPH (benign prostatic hyperplasia)  Well controlled on flomax.     Low serum vitamin B12  Just recently started on monthly B12 injections for a B12 level of 103 as of 2018.     IGT (impaired glucose tolerance)  A1c normal as of 2018.     Depression  Shawanda scored a 15 on his PHQ-9 however his wife says that he isn't depressed. 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 He denies feeling depressed but is run down. We just started treating him with monthly B12 injections starting 3/15/2019 for his B12 in the low 100s when last checked 2018.     Services suggested: No services needed at this time  Health Care Screening recommendations as per orders if " indicated.  Referrals offered: PT/OT/Nutrition counseling/Behavioral Health/Smoking cessation as per orders if indicated.    Discussion today about general wellness and lifestyle habits:    · Prevent falls and reduce trip hazards; Cautioned about securing or removing rugs.  · Have a working fire alarm and carbon monoxide detector;   · Engage in regular physical activity and social activities       Follow-up: No Follow-up on file.

## 2019-03-22 NOTE — ASSESSMENT & PLAN NOTE
Shawanda scored a 15 on his PHQ-9 however his wife says that he isn't depressed. 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 He denies feeling depressed but is run down. We just started treating him with monthly B12 injections starting 3/15/2019 for his B12 in the low 100s when last checked 9/2018.

## 2019-04-15 NOTE — PROGRESS NOTES
Shawanda Daley JrSheng is a 84 y.o. male here for a non-provider visit for B12 injection.    Reason for injection:Low Serum Vitamin B12  Order in MAR?: Yes  Patient supplied?:No  Minimum interval has been met for this injection (per MAR order): Yes    Order and dose verified by: CP  Patient tolerated injection and no adverse effects were observed or reported: Yes    # of Administrations remaining in MAR: 1

## 2019-05-15 NOTE — PROGRESS NOTES
Shawanda Daley JrSheng is a 84 y.o. male here for a non-provider visit for b12 injection.    Reason for injection: Low serum vitamin B12   Order in MAR?: Yes  Patient supplied?:No  Minimum interval has been met for this injection (per MAR order): Yes    Order and dose verified by: MT  Patient tolerated injection and no adverse effects were observed or reported: Yes    # of Administrations remaining in MAR: 4

## 2019-05-15 NOTE — TELEPHONE ENCOUNTER
Pt came in for an MA visit b12 shot.  He states today will be his third shot and is asking how long before he feels any effect.  He states he is actually feeling worse

## 2019-06-11 NOTE — PROGRESS NOTES
Pt had a question about $1005.00 bill from [employer] their number is [phone] they did a biopsy on 2 polyps and when I called them they stated that [facility] hasn't responded to the claim they will try to send another claim to [facility] and told me to tell the pt disregard the letter that was sent to him they will be sending another one In 30-40 days.

## 2019-06-11 NOTE — PROGRESS NOTES
Called the pt informed him to disregard the letter [facility] will send another claims to [facility] and then send the pt another statement in about 30 days.

## 2019-06-14 NOTE — PROGRESS NOTES
Shawanda Daley JrSheng is a 84 y.o. male here for a non-provider visit for B12 injection.    Reason for injection: Fatigue  Order in MAR?: Yes  Patient supplied?:No  Minimum interval has been met for this injection (per MAR order): Yes    Order and dose verified by: VS  Patient tolerated injection and no adverse effects were observed or reported: Yes    # of Administrations remaining in MAR: N/A

## 2019-07-03 PROBLEM — R20.2 TINGLING OF BOTH FEET: Status: ACTIVE | Noted: 2019-01-01

## 2019-07-03 PROBLEM — R30.0 DYSURIA: Status: ACTIVE | Noted: 2019-01-01

## 2019-07-03 NOTE — ASSESSMENT & PLAN NOTE
When he places his feet up on a couch his redness and swelling improves. Elevates his feet about 3 times a day and symptoms improve after doing this.

## 2019-07-03 NOTE — ASSESSMENT & PLAN NOTE
Continues to have fatigue, used to be able to walk quite a bit but now can hardly walk 30 minutes without having fatigue and feeling winded.

## 2019-07-03 NOTE — PROGRESS NOTES
PRIMARY CARE CLINIC FOLLOW UP VISIT  Chief Complaint   Patient presents with   • Urinary Frequency   • Foot Problem     bilateral tingling    • Fatigue     History of Present Illness     Dysuria  Was having some dysuria but this has resolved. In for a UA today. Drank a lot of water and had some cranberry juice.     Tingling of both feet  When he places his feet up on a couch his redness and swelling improves. Elevates his feet about 3 times a day and symptoms improve after doing this.     Fatigue  Continues to have fatigue, used to be able to walk quite a bit but now can hardly walk 30 minutes without having fatigue and feeling winded.     Current Outpatient Prescriptions   Medication Sig Dispense Refill   • erythromycin 5 MG/GM Ointment      • acetaminophen (TYLENOL) 500 MG Tab Take 500-1,000 mg by mouth every 6 hours as needed.     • tamsulosin (FLOMAX) 0.4 MG capsule        Current Facility-Administered Medications   Medication Dose Route Frequency Provider Last Rate Last Dose   • cyanocobalamin (VITAMIN B-12) injection 1,000 mcg  1,000 mcg Intramuscular Q30 DAYS Varun Scott M.D.   1,000 mcg at 06/14/19 1034     Past Medical History:   Diagnosis Date   • BPH (benign prostatic hyperplasia) 3/4/2016   • Cataract     fabiola lens surgery   • Low serum vitamin B12 3/15/2019   • Renal disorder 2015    kidney stone     Past Surgical History:   Procedure Laterality Date   • BREAST BIOPSY Left 11/18/2016    Procedure: BREAST BIOPSY;  Surgeon: Adrianne Dobbins M.D.;  Location: SURGERY [facility];  Service:    • INGUINAL HERNIA REPAIR ROBOTIC Bilateral 8/10/2016    Procedure: INGUINAL HERNIA REPAIR ROBOTIC with mesh  ;  Surgeon: Francis Fletcher M.D.;  Location: SURGERY [facility];  Service:    • OTHER  2000    anal cyst     Social History   Substance Use Topics   • Smoking status: Never Smoker   • Smokeless tobacco: Never Used   • Alcohol use 0.0 oz/week      Comment: 0-2 glasses of wine per month     Social History  "    Social History Narrative    Retired       Family History   Problem Relation Age of Onset   • Other Mother         old age   • Heart Disease Father    • Stroke Father 86         from stroke   • Colon Cancer Father         colon cancer   • Cancer Maternal Uncle         prostate   • Alzheimer's Disease Sister    • Cancer Brother         LUNG CANCER     Family Status   Relation Status   • Mo    • Fa    • MUnc    • Sis    • Bro    • Bro Alive   • MGMo    • MGFa    • PGMo    • PGFa      Allergies: Patient has no known allergies.    ROS  As per HPI above. All other systems reviewed and negative.        Objective   BP (!) 166/80   Pulse (!) 48   Temp 36.4 °C (97.6 °F)   Resp 16   Ht 1.791 m (5' 10.51\")   Wt 70.3 kg (155 lb)   SpO2 100%  Body mass index is 21.92 kg/m².    General: alert and oriented, pleasant, cooperative  HEENT: Normocephalic, atraumatic. No thyroid masses. Oropharynx clear without exudate or injection.   Cardiovascular: regular rhythm, bradycardia, normal S1/S2  Pulmonary: lungs clear to auscultation bilaterally  Skin: warm and dry, no lesions or rashes  Psychiatric: appropriate mood and affect. Good insight and appropriate judgment     Assessment and Plan   The following treatment plan was discussed     1. Urinary frequency  UA not suggestive of a UTI, dysuria has resolved.   - POCT Urinalysis    2. Tingling of both feet  Since his symptoms improve with elevation they are most likely secondary to venous insufficiency, encouraged to continue elevation and also compression socks.     4. Fatigue, unspecified type  Prolonged MI interval on EKG, also check TTE. Given this and his progressive fatigue in the past year will refer to cardiology.   - EC-ECHOCARDIOGRAM COMPLETE W/O CONT; Future  - EKG - Clinic Performed      Healthcare maintenance     There are no preventive care reminders to display for this " patient.    No Follow-up on file.    Varun Scott MD  Internal Medicine  [facility]

## 2019-07-03 NOTE — ASSESSMENT & PLAN NOTE
Was having some dysuria but this has resolved. In for a UA today. Drank a lot of water and had some cranberry juice.

## 2019-07-08 NOTE — TELEPHONE ENCOUNTER
1. Caller Name: Shawanda                                          Call Back Number: [phone] (home)        Patient approves a detailed voicemail message: N\A    2. Patient is requesting EKG results dated: 07/05/19    3. Confirmed results are in chart. Patient advised they will be contacted once interpreted by provider.

## 2019-07-08 NOTE — TELEPHONE ENCOUNTER
We had discussed during his appt that his EKG shows a slowing in electrical activity of the heart, which is why he will be seeing a cardiologist

## 2019-07-09 NOTE — TELEPHONE ENCOUNTER
Phone Number Called: [phone]    Call outcome: spoke to patient regarding message below    Message: Patient given message from below.  He states that he really appreciates you ordering the test and reviewing it.  He is very thankful that you are referring him to cardiology.  HAYDE

## 2019-07-11 PROBLEM — I27.20 PULMONARY HYPERTENSION (HCC): Status: ACTIVE | Noted: 2019-01-01

## 2019-07-11 PROBLEM — R93.1 ABNORMAL FINDINGS ON DIAGNOSTIC IMAGING OF HEART AND CORONARY CIRCULATION: Status: ACTIVE | Noted: 2019-01-01

## 2019-07-11 PROBLEM — I35.0 MILD AORTIC STENOSIS BY PRIOR ECHOCARDIOGRAM: Status: ACTIVE | Noted: 2019-01-01

## 2019-07-11 NOTE — LETTER
[facility]   [address]  [place] [address]  Phone: [phone]  Fax: [phone]              Shawanda Daley Jr.  1935    Encounter Date: 7/11/2019    Gordon Chris M.D.          PROGRESS NOTE:  Chief Complaint   Patient presents with   • Fatigue       Subjective:   Shawanda Daley Jr. is a 84 y.o. male who presents today in consultation at the request of Dr. Scott for fatigue and aortic stenosis.  This patient has deteriorated in the last year or year and a half with profound fatigue even after sleeping 9 hours at night.  This particular problem has been for the last 4 months.  For over a year he has had redness and mild pain of his feet as well as tingling.  When he tries to walk over 100 feet he gets fatigue and quite short of breath.  This is progressive over the last month.  Stool color has changed to gray.  15 to 20 pound weight loss in the last past year with poor appetite.  He states he has had a colonoscopy within the last year with minimal polyps removed.    He denies any chest discomfort or palpitations.    Recent echo demonstrates mild aortic stenosis, normal left ventricular ejection fraction, and moderate elevation of pulmonary artery pressure of 46 mmHg.  There is no history of lung disease cigarette usage, asthma, and although he sleeps poorly at night, his wife states he does not snore.    Recent EKG demonstrates sinus bradycardia at a rate of 55, mild first-degree AV block, low QRS voltage in limb leads.    Lab work from September 2018 and December 2018 including CBC, comprehensive metabolic panel thyroid panel, lipid panel all appear normal.  No recent lab work.    Past Medical History:   Diagnosis Date   • BPH (benign prostatic hyperplasia) 3/4/2016   • Cataract     fabiola lens surgery   • Low serum vitamin B12 3/15/2019   • Renal disorder 2015    kidney stone     Past Surgical History:   Procedure Laterality Date   • BREAST BIOPSY  Left 2016    Procedure: BREAST BIOPSY;  Surgeon: Adrianne Dobbins M.D.;  Location: SURGERY [facility];  Service:    • INGUINAL HERNIA REPAIR ROBOTIC Bilateral 8/10/2016    Procedure: INGUINAL HERNIA REPAIR ROBOTIC with mesh  ;  Surgeon: Francis Fletcher M.D.;  Location: SURGERY [facility];  Service:    • OTHER  2000    anal cyst     Family History   Problem Relation Age of Onset   • Other Mother         old age   • Heart Disease Father    • Stroke Father 86         from stroke   • Colon Cancer Father         colon cancer   • Cancer Maternal Uncle         prostate   • Alzheimer's Disease Sister    • Cancer Brother         LUNG CANCER     Social History     Social History   • Marital status:      Spouse name: N/A   • Number of children: N/A   • Years of education: N/A     Occupational History   • Not on file.     Social History Main Topics   • Smoking status: Never Smoker   • Smokeless tobacco: Never Used   • Alcohol use No      Comment: 0-2 glasses of wine per month   • Drug use: No   • Sexual activity: No     Other Topics Concern   • Not on file     Social History Narrative    Retired       No Known Allergies  Outpatient Encounter Prescriptions as of 2019   Medication Sig Dispense Refill   • [DISCONTINUED] erythromycin 5 MG/GM Ointment      • [DISCONTINUED] acetaminophen (TYLENOL) 500 MG Tab Take 500-1,000 mg by mouth every 6 hours as needed.     • [DISCONTINUED] tamsulosin (FLOMAX) 0.4 MG capsule        Facility-Administered Encounter Medications as of 2019   Medication Dose Route Frequency Provider Last Rate Last Dose   • cyanocobalamin (VITAMIN B-12) injection 1,000 mcg  1,000 mcg Intramuscular Q30 DAYS Varun Scott M.D.   1,000 mcg at 19 1034     Review of Systems   Constitutional: Positive for malaise/fatigue and weight loss. Negative for chills and fever.   Eyes: Negative for blurred vision and discharge.   Respiratory: Positive for shortness of breath.  "Negative for cough.    Cardiovascular: Positive for leg swelling. Negative for chest pain, palpitations and PND.   Gastrointestinal: Negative for heartburn and nausea.        Poor appetite   Genitourinary: Positive for frequency and urgency. Negative for dysuria.   Musculoskeletal: Positive for joint pain. Negative for myalgias.   Skin: Negative for itching and rash.   Neurological: Negative for dizziness and headaches.   Endo/Heme/Allergies: Negative for environmental allergies. Does not bruise/bleed easily.   Psychiatric/Behavioral: Negative for depression. The patient is not nervous/anxious.         Objective:   /76 (BP Location: Left arm, Patient Position: Sitting, BP Cuff Size: Adult)   Pulse (!) 48   Ht 1.791 m (5' 10.5\")   Wt 72.1 kg (159 lb)   SpO2 98%   BMI 22.49 kg/m²      Physical Exam   Constitutional: He is oriented to person, place, and time.   Pleasant slow-moving man   Neck: No JVD present.   Cardiovascular:   Murmur heard.   Systolic murmur is present with a grade of 2/6   Pulses:       Carotid pulses are 3+ on the right side, and 3+ on the left side.       Radial pulses are 3+ on the right side, and 3+ on the left side.        Dorsalis pedis pulses are 1+ on the left side.        Posterior tibial pulses are 2+ on the right side, and 2+ on the left side.   Carotid upstroke normal.  Murmur does not radiate to carotids   Pulmonary/Chest: Effort normal and breath sounds normal.   Abdominal: Soft. He exhibits no mass. There is no tenderness.   Musculoskeletal:   Symmetrical pitting edema in the forefeet only   Neurological: He is alert and oriented to person, place, and time.   Skin: Skin is warm and dry.       Assessment:     1. Mild aortic stenosis by prior echocardiogram     2. Fatigue, unspecified type  OVERNIGHT PULSE OXIMETRY    NM-CARDIAC STRESS TEST    DX-CHEST-2 VIEWS   3. Pulmonary hypertension (HCC)  OVERNIGHT PULSE OXIMETRY    DX-CHEST-2 VIEWS   4. Abnormal findings on diagnostic " imaging of heart and coronary circulation   NM-CARDIAC STRESS TEST       Medical Decision Making:  Today's Assessment / Status / Plan:   Fatigue and weight loss and poor appetite certainly raise the question of GI malignancy of some sort.  Change in color of stools of some concern.    Fatigue and shortness of breath with effort could be subtle manifestations of coronary artery disease, therefore chemical MPI ordered.  I do not think his mild aortic stenosis is contributing at all to his chief complaints.  With respect to pulmonary hypertension, moderate, I have ordered overnight pulse oximetry and a chest x-ray.    Follow-up after the above test.  Thank you for this consult.      Varun Scott M.D.  [address] [place] [id number]  VIA In Basket

## 2019-07-11 NOTE — PROGRESS NOTES
Chief Complaint   Patient presents with   • Fatigue       Subjective:   Shawanda Daley Jr. is a 84 y.o. male who presents today in consultation at the request of Dr. Scott for fatigue and aortic stenosis.  This patient has deteriorated in the last year or year and a half with profound fatigue even after sleeping 9 hours at night.  This particular problem has been for the last 4 months.  For over a year he has had redness and mild pain of his feet as well as tingling.  When he tries to walk over 100 feet he gets fatigue and quite short of breath.  This is progressive over the last month.  Stool color has changed to gray.  15 to 20 pound weight loss in the last past year with poor appetite.  He states he has had a colonoscopy within the last year with minimal polyps removed.    He denies any chest discomfort or palpitations.    Recent echo demonstrates mild aortic stenosis, normal left ventricular ejection fraction, and moderate elevation of pulmonary artery pressure of 46 mmHg.  There is no history of lung disease cigarette usage, asthma, and although he sleeps poorly at night, his wife states he does not snore.    Recent EKG demonstrates sinus bradycardia at a rate of 55, mild first-degree AV block, low QRS voltage in limb leads.    Lab work from September 2018 and December 2018 including CBC, comprehensive metabolic panel thyroid panel, lipid panel all appear normal.  No recent lab work.    Past Medical History:   Diagnosis Date   • BPH (benign prostatic hyperplasia) 3/4/2016   • Cataract     fabiola lens surgery   • Low serum vitamin B12 3/15/2019   • Renal disorder 2015    kidney stone     Past Surgical History:   Procedure Laterality Date   • BREAST BIOPSY Left 11/18/2016    Procedure: BREAST BIOPSY;  Surgeon: Adrianne Dobbins M.D.;  Location: SURGERY [facility];  Service:    • INGUINAL HERNIA REPAIR ROBOTIC Bilateral 8/10/2016    Procedure: INGUINAL HERNIA REPAIR ROBOTIC with mesh  ;  Surgeon: Francis Fletcher,  M.D.;  Location: SURGERY [facility];  Service:    • OTHER  2000    anal cyst     Family History   Problem Relation Age of Onset   • Other Mother         old age   • Heart Disease Father    • Stroke Father 86         from stroke   • Colon Cancer Father         colon cancer   • Cancer Maternal Uncle         prostate   • Alzheimer's Disease Sister    • Cancer Brother         LUNG CANCER     Social History     Social History   • Marital status:      Spouse name: N/A   • Number of children: N/A   • Years of education: N/A     Occupational History   • Not on file.     Social History Main Topics   • Smoking status: Never Smoker   • Smokeless tobacco: Never Used   • Alcohol use No      Comment: 0-2 glasses of wine per month   • Drug use: No   • Sexual activity: No     Other Topics Concern   • Not on file     Social History Narrative    Retired       No Known Allergies  Outpatient Encounter Prescriptions as of 2019   Medication Sig Dispense Refill   • [DISCONTINUED] erythromycin 5 MG/GM Ointment      • [DISCONTINUED] acetaminophen (TYLENOL) 500 MG Tab Take 500-1,000 mg by mouth every 6 hours as needed.     • [DISCONTINUED] tamsulosin (FLOMAX) 0.4 MG capsule        Facility-Administered Encounter Medications as of 2019   Medication Dose Route Frequency Provider Last Rate Last Dose   • cyanocobalamin (VITAMIN B-12) injection 1,000 mcg  1,000 mcg Intramuscular Q30 DAYS Varun Scott M.D.   1,000 mcg at 19 1034     Review of Systems   Constitutional: Positive for malaise/fatigue and weight loss. Negative for chills and fever.   Eyes: Negative for blurred vision and discharge.   Respiratory: Positive for shortness of breath. Negative for cough.    Cardiovascular: Positive for leg swelling. Negative for chest pain, palpitations and PND.   Gastrointestinal: Negative for heartburn and nausea.        Poor appetite   Genitourinary: Positive for frequency and urgency. Negative for dysuria.  "  Musculoskeletal: Positive for joint pain. Negative for myalgias.   Skin: Negative for itching and rash.   Neurological: Negative for dizziness and headaches.   Endo/Heme/Allergies: Negative for environmental allergies. Does not bruise/bleed easily.   Psychiatric/Behavioral: Negative for depression. The patient is not nervous/anxious.         Objective:   /76 (BP Location: Left arm, Patient Position: Sitting, BP Cuff Size: Adult)   Pulse (!) 48   Ht 1.791 m (5' 10.5\")   Wt 72.1 kg (159 lb)   SpO2 98%   BMI 22.49 kg/m²     Physical Exam   Constitutional: He is oriented to person, place, and time.   Pleasant slow-moving man   Neck: No JVD present.   Cardiovascular:   Murmur heard.   Systolic murmur is present with a grade of 2/6   Pulses:       Carotid pulses are 3+ on the right side, and 3+ on the left side.       Radial pulses are 3+ on the right side, and 3+ on the left side.        Dorsalis pedis pulses are 1+ on the left side.        Posterior tibial pulses are 2+ on the right side, and 2+ on the left side.   Carotid upstroke normal.  Murmur does not radiate to carotids   Pulmonary/Chest: Effort normal and breath sounds normal.   Abdominal: Soft. He exhibits no mass. There is no tenderness.   Musculoskeletal:   Symmetrical pitting edema in the forefeet only   Neurological: He is alert and oriented to person, place, and time.   Skin: Skin is warm and dry.       Assessment:     1. Mild aortic stenosis by prior echocardiogram     2. Fatigue, unspecified type  OVERNIGHT PULSE OXIMETRY    NM-CARDIAC STRESS TEST    DX-CHEST-2 VIEWS   3. Pulmonary hypertension (HCC)  OVERNIGHT PULSE OXIMETRY    DX-CHEST-2 VIEWS   4. Abnormal findings on diagnostic imaging of heart and coronary circulation   NM-CARDIAC STRESS TEST       Medical Decision Making:  Today's Assessment / Status / Plan:   Fatigue and weight loss and poor appetite certainly raise the question of GI malignancy of some sort.  Change in color of stools " of some concern.    Fatigue and shortness of breath with effort could be subtle manifestations of coronary artery disease, therefore chemical MPI ordered.  I do not think his mild aortic stenosis is contributing at all to his chief complaints.  With respect to pulmonary hypertension, moderate, I have ordered overnight pulse oximetry and a chest x-ray.    Follow-up after the above test.  Thank you for this consult.

## 2019-07-11 NOTE — TELEPHONE ENCOUNTER
Per Dr. Gordon Chris Would like this patient to have a OPO.    OPO sent to [facility]  Phone: [phone]  Fax: [phone]

## 2019-07-16 NOTE — TELEPHONE ENCOUNTER
Pt calls back & is notified that CXR showed no acute abnormalities, and MPI was negative for ischemia. He is getting the OPO this week and will keep his future FV w/ Alanna Mendez to review all results.

## 2019-07-16 NOTE — TELEPHONE ENCOUNTER
"Left message for pt. to call back for recent test results -  \"good news\"  (see results MPI & CXR)  "

## 2019-07-16 NOTE — PROGRESS NOTES
Shawanda Daley JrSheng is a 84 y.o. male here for a non-provider visit for B12 injection.    Reason for injection: Low Serum Vitamin B12  Order in MAR?: Yes  Patient supplied?:No  Minimum interval has been met for this injection (per MAR order): Yes    Order and dose verified by: MT  Patient tolerated injection and no adverse effects were observed or reported: Yes    # of Administrations remaining in MAR: N/A

## 2019-07-18 NOTE — PROGRESS NOTES
Pt called and let me know he received the bill again for $1005.00 and his bill states that the insurance was not able to identify him as a member. I called [facility] and made sure all of the pt's info matches what we have in . They let me know that they also had an incorrect claims address. I updated that to [id number] [place] [address] and made sure they had [facility] as the insurance company. Once that was updated they let me know a new claim would be sent and the pt can disregard the bill he received at this time, we will wait for [facility] to receive the claim. I called the pt to let him know he can disregard the bill and we will find out how much is owed once the claim is processed.

## 2019-08-07 NOTE — PROGRESS NOTES
Chief Complaint   Patient presents with   • Hypertension       Subjective:   WILLIAM Daley Jr. is a very pleasant 84 y.o. male who presents today with his equally pleasant wife Esther for follow up regarding his aortic stenosis and pulmonary hypertension.     Patient was seen for initial consultation by Dr. Gordon Chris on 7/11/19 where patient reported significant deterioration in his physical health. Primary complaint was excessive fatigue, diminished exercise capacity and 15-20 lbs weight loss. A chemical MPI, CXR and OPO was ordered. The MPI was negative for ischemia and his CXR showed no acute abnormalities.    Today patient is somewhat tearful, states that he is not doing well overall at all.  Continues to have excessive fatigue and unintentional weight loss.  Patient tells me that last night he had sudden severe stomach pain in the umbilical area with a sudden protrusion of a golf ball sized lump.  This morning he still has some abdominal discomfort rated 3-4/10 but states that the size of the lump has somewhat decreased but is still present.  Denies difficulty with bowel movements or urination.  Denies blood in his stools or black tarry stools.  Denies fever. Patient is concerned as he did have a hernia repair surgery just below the area with mesh placement, worried that the mesh could be failing.     Wife tells me that her  has hardly any appetite and that they use to walk 2-3 miles per day and he has not been able to do so for many months.     From a cardiac perspective patient does not have any complaints.  Denies chest pain, palpitations, shortness of breath, lower extremity edema or dizziness.    Past Medical History:   Diagnosis Date   • BPH (benign prostatic hyperplasia) 3/4/2016   • Cataract     fabiola lens surgery   • Low serum vitamin B12 3/15/2019   • Renal disorder 2015    kidney stone     Past Surgical History:   Procedure Laterality Date   • BREAST BIOPSY Left 11/18/2016    Procedure:  BREAST BIOPSY;  Surgeon: Adrianne Dobbins M.D.;  Location: SURGERY [facility];  Service:    • INGUINAL HERNIA REPAIR ROBOTIC Bilateral 8/10/2016    Procedure: INGUINAL HERNIA REPAIR ROBOTIC with mesh  ;  Surgeon: Francis Fletcher M.D.;  Location: SURGERY [facility];  Service:    • OTHER  2000    anal cyst     Family History   Problem Relation Age of Onset   • Other Mother         old age   • Heart Disease Father    • Stroke Father 86         from stroke   • Colon Cancer Father         colon cancer   • Cancer Maternal Uncle         prostate   • Alzheimer's Disease Sister    • Cancer Brother         LUNG CANCER     Social History     Socioeconomic History   • Marital status:      Spouse name: Not on file   • Number of children: Not on file   • Years of education: Not on file   • Highest education level: Not on file   Occupational History   • Not on file   Social Needs   • Financial resource strain: Not on file   • Food insecurity:     Worry: Not on file     Inability: Not on file   • Transportation needs:     Medical: Not on file     Non-medical: Not on file   Tobacco Use   • Smoking status: Never Smoker   • Smokeless tobacco: Never Used   Substance and Sexual Activity   • Alcohol use: No     Alcohol/week: 0.0 oz     Comment: 0-2 glasses of wine per month   • Drug use: No   • Sexual activity: Never     Partners: Female   Lifestyle   • Physical activity:     Days per week: Not on file     Minutes per session: Not on file   • Stress: Not on file   Relationships   • Social connections:     Talks on phone: Not on file     Gets together: Not on file     Attends Confucianism service: Not on file     Active member of club or organization: Not on file     Attends meetings of clubs or organizations: Not on file     Relationship status: Not on file   • Intimate partner violence:     Fear of current or ex partner: Not on file     Emotionally abused: Not on file     Physically abused: Not on file     Forced  "sexual activity: Not on file   Other Topics Concern   • Not on file   Social History Narrative    Retired       No Known Allergies  Facility-Administered Encounter Medications as of 8/8/2019   Medication Dose Route Frequency Provider Last Rate Last Dose   • cyanocobalamin (VITAMIN B-12) injection 1,000 mcg  1,000 mcg Intramuscular Q30 DAYS Varun Scott M.D.   1,000 mcg at 07/16/19 1146     No outpatient encounter medications on file as of 8/8/2019.     Review of Systems   Constitutional: Positive for malaise/fatigue and weight loss.   Respiratory: Negative for shortness of breath.    Cardiovascular: Negative for chest pain, palpitations, orthopnea, claudication, leg swelling and PND.   Gastrointestinal: Positive for abdominal pain. Negative for blood in stool, constipation, diarrhea and nausea.   Neurological: Positive for weakness. Negative for dizziness.   All other systems reviewed and are negative.       Objective:   /68 (BP Location: Left arm, Patient Position: Sitting, BP Cuff Size: Adult)   Pulse (!) 52   Ht 1.791 m (5' 10.5\")   Wt 69.4 kg (153 lb)   SpO2 99%   BMI 21.64 kg/m²     Physical Exam   Constitutional: He is oriented to person, place, and time. He appears well-developed and well-nourished. No distress.   HENT:   Head: Normocephalic.   Eyes: EOM are normal.   Neck: No JVD present.   Cardiovascular: Normal rate and regular rhythm.   Murmur heard.  Pulmonary/Chest: Effort normal and breath sounds normal. No respiratory distress.   Abdominal: Soft. Bowel sounds are decreased. There is tenderness in the periumbilical area. There is guarding.   Non-reducible mass above umbilical area.    Musculoskeletal: He exhibits no edema.   Neurological: He is alert and oriented to person, place, and time.   Skin: Skin is warm and dry.   Psychiatric: He has a normal mood and affect. His speech is normal. Judgment normal. Cognition and memory are normal.   Tearful, concerned        NM-Cardiac " Stress Test 7/15/19:  NUCLEAR IMAGING INTERPRETATION  No evidence of significant jeopardized viable myocardium or prior myocardial infarction.  Normal left ventricular size, ejection fraction, and wall motion.  ECG INTERPRETATION  Negative stress ECG for ischemia.    TTE 7/5/19:  CONCLUSIONS  No prior study is available for comparison.   Normal left ventricular systolic function.   Left ventricular ejection fraction is visually estimated to be 65%.   Grade I diastolic dysfunction.  Mild aortic stenosis. Transvalvular gradients are - Peak: 27 mmHg, Mean: 16 mmHg.  Estimated right ventricular systolic pressure  is 46 Hg.  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    Left Ventricle  Normal left ventricular chamber size. Mild concentric left ventricular hypertrophy. Normal left ventricular systolic function. Left ventricular ejection fraction is visually estimated to be 65%. Normal regional wall motion. Grade I diastolic dysfunction.    Chest X-Ray 7/16/19:  No acute cardiopulmonary abnormality.    Assessment:     1. Mild aortic stenosis by prior echocardiogram     2. Pulmonary hypertension (HCC)     3. Chronic fatigue     4. Unintentional weight loss     5. Abdominal wall bulge     6. Periumbilical abdominal pain         Medical Decision Making:  Today's Assessment / Status / Plan:     Aortic Stenosis:  -Mild per TTE on 7/5/19.  -We will continue to monitor.    Pulmonary Hypertension:  -TTE on 7/5/19 showed RVSP of 46 mmHg.  -Appears euvolemic.    Fatigue/Unintentional Weight Loss:  -Concern for malignancy.    Abdominal pain with non-reducible abdominal bulge:  -Discussed with patient that I can order a STAT abdominal CT to be completed today and if there is abnormality (which I am sure there will be of some kind) we can refer him to surgery/GI or ER depending on the results. Patient is very uncomfortable and he and his wife is very concerned about the abdominal pain and would like to go to the emergency  room for further evaluation and work-up.    Patient will follow-up with myself in 2-3 months or earlier if needed.  Encouraged patient to contact office should any questions or concerns arise in the meantime.  Patient understands and agrees plan of care.    ROGELIO Aparicio.    Collaborating Provider: Dr. Mohan Rojas       Please note that this dictation was created using voice recognition software. I have made every reasonable attempt to correct obvious errors, but I expect that there are errors of grammar and possibly content I did not discover before finalizing the note.

## 2019-08-08 PROBLEM — R10.33 PERIUMBILICAL ABDOMINAL PAIN: Status: ACTIVE | Noted: 2019-01-01

## 2019-08-08 PROBLEM — R19.00 ABDOMINAL WALL BULGE: Status: ACTIVE | Noted: 2019-01-01

## 2019-08-08 PROBLEM — R63.4 UNINTENTIONAL WEIGHT LOSS: Status: ACTIVE | Noted: 2019-01-01

## 2019-08-08 NOTE — ED TRIAGE NOTES
Chief Complaint   Patient presents with   • Abdominal Pain     mid abdomen     Pt ambulated to triage, c/o pain mid abdomen where he had previous hernia repair 2016. Bulging noted, pain with palpation.

## 2019-08-08 NOTE — DISCHARGE INSTRUCTIONS
Follow-up with primary care as scheduled for you on Monday at 12:50 PM for reevaluation, medication management, close blood pressure monitoring and continued treatment and referrals for abdominal pain/hernia as well as lower extremity paresthesias.  Follow-up with Dr. Fletcher for reevaluation of fat-containing ventral hernia.    Continue home occasions as previously indicated.  Tylenol 650 mg every 6 hours as needed for discomfort.    Return to the emergency department for persistent worsening abdominal pain, cutaneous changes or discoloration, fever, vomiting or other new concerns.

## 2019-08-09 NOTE — TELEPHONE ENCOUNTER
Future Appointments       Provider Department Center    8/12/2019 12:55 PM Varun Scott M.D. [facility]    9/16/2019 10:55 AM Varun Scott M.D. [facility]        ESTABLISHED PATIENT PRE-VISIT PLANNING     Patient was NOT contacted to complete PVP.       1.  Reviewed notes from the last few office visits within the medical group: Yes    2.  If any orders were placed at last visit or intended to be done for this visit (i.e. 6 mos follow-up), do we have Results/Consult Notes?        •  Labs - Labs ordered, completed on 08/08/2019 and results are in chart.       •  Imaging - Imaging ordered, completed and results are in chart.       •  Referrals - No referrals were ordered at last office visit.    3. Is this appointment scheduled as a Hospital Follow-Up? No    4.  Immunizations were updated in [employer] using WebIZ?: Yes       •  Web Iz Recommendations: FLU, TD, VARICELLA (Chicken Pox)  and SHINGRIX (Shingles)    5.  Patient is due for the following Health Maintenance Topics:   There are no preventive care reminders to display for this patient.    6. Orders for overdue Health Maintenance topics pended in Pre-Charting? NO    7.  AHA (MDX) form printed for Provider? No, already completed    8.  Patient was NOT informed to arrive 15 min prior to their scheduled appointment and bring in their medication bottles.

## 2019-08-09 NOTE — PROGRESS NOTES
Called pt to f/u regarding ED visit yesterday. He stated he is doing ok. He does have an appt with pcp for Monday 8/12. I also asked pt if he needed help scheduling with Dr. Fletcher at [facility]. He stated that he already scheduled that appt for 8/19. Pt did not have any questions or concerns at this time.

## 2019-08-12 PROBLEM — K46.9 ABDOMINAL HERNIA: Status: ACTIVE | Noted: 2019-01-01

## 2019-08-12 NOTE — ASSESSMENT & PLAN NOTE
He continues to be quite fatigued and has stool color changes. Has no appetite, is also continuing to lose weight and feel quite weak.

## 2019-08-12 NOTE — ASSESSMENT & PLAN NOTE
Was in the ER recently for an abdominal bulge noted to be a hernia. He has been feeling nausea for the last 6 months or so. The CT a/p in the ED demonstrated a fat containing hernia, no intestinal involvement. He has had a repair of this in 2016, has follow up with his surgeon again 8/20/19 who did the original hernia repair.

## 2019-08-26 NOTE — TELEPHONE ENCOUNTER
Dr. Francis Fletcher with [facility] is requesting cardiac clearance for pt's Incisional Hernia Repair. Clearance should be faxed to [phone] when complete.    To WILLIAM

## 2019-08-26 NOTE — LETTER
PROCEDURE/SURGERY CLEARANCE FORM      Encounter Date: 8/26/2019    Patient: Shawanda Daley Jr.  YOB: 1935    CARDIOLOGIST:  FAROOQ Paredes  REFERRING DOCTOR:  Francis Fletcher MD    The above patient is low-moderate risk to have the following procedure/surgery: Incisional Hernia Repair                                           Additional comments: Normal MPI. Echo normal except moderate pulmonary hypertension.                 MD Signature  FAROOQ Paredes

## 2019-08-26 NOTE — TELEPHONE ENCOUNTER
ROGELIO Aparicio.  You 32 minutes ago (2:58 PM)      Patient is low-moderate risk. Normal MPI. Echo normal except moderate pulmonary hypertension.       Letter drafted with above recommendations and faxed to [phone]. Receipt confirmed.

## 2019-09-13 NOTE — TELEPHONE ENCOUNTER
ESTABLISHED PATIENT PRE-VISIT PLANNING     Patient was NOT contacted to complete PVP.    1.  Reviewed notes from the last few office visits within the medical group: Yes    2.  If any orders were placed at last visit or intended to be done for this visit (i.e. 6 mos follow-up), do we have Results/Consult Notes?        •  Labs - Labs ordered, completed on 9/6/2019 and results are in chart.       •  Imaging - Imaging was not ordered at last office visit.       •  Referrals - Referral ordered, patient has NOT been seen.    3. Is this appointment scheduled as a Hospital Follow-Up? No    4.  Immunizations were updated in [employer] using WebIZ?: Yes       •  Web Iz Recommendations: FLU, TD, VARICELLA (Chicken Pox)  and SHINGRIX (Shingles)    5.  Patient is due for the following Health Maintenance Topics:   Health Maintenance Due   Topic Date Due   • IMM INFLUENZA (1) 09/01/2019     6. Orders for overdue Health Maintenance topics pended in Pre-Charting? NO    7.  AHA (MDX) form printed for Provider? No, already completed    8.  Patient was NOT informed to arrive 15 min prior to their scheduled appointment and bring in their medication bottles.

## 2019-09-16 PROBLEM — R79.89 ELEVATED SERUM CREATININE: Status: ACTIVE | Noted: 2019-01-01

## 2019-09-16 NOTE — PROGRESS NOTES
PRIMARY CARE CLINIC FOLLOW UP VISIT  Chief Complaint   Patient presents with   • Fatigue     Req B-12 injection      History of Present Illness     Fatigue  Continues to have fatigue. Has an appointment with GI on 10/2019. Has noted some thickening on his colon with prior CT imaging.     Low serum vitamin B12  After 6 months of replacement with B12 injections his B12 went from 103 to 218.     Elevated serum creatinine  Notes elevated Cr on his 2019 and 2019 labs.     Current Outpatient Medications   Medication Sig Dispense Refill   • tamsulosin (FLOMAX) 0.4 MG capsule        Current Facility-Administered Medications   Medication Dose Route Frequency Provider Last Rate Last Dose   • cyanocobalamin (VITAMIN B-12) injection 1,000 mcg  1,000 mcg Intramuscular Q30 DAYS Varun Scott M.D.         Past Medical History:   Diagnosis Date   • BPH (benign prostatic hyperplasia) 3/4/2016   • Cataract     fabiola lens surgery   • Low serum vitamin B12 3/15/2019   • Renal disorder 2015    kidney stone     Past Surgical History:   Procedure Laterality Date   • BREAST BIOPSY Left 2016    Procedure: BREAST BIOPSY;  Surgeon: Adrianne Dobbins M.D.;  Location: SURGERY [facility];  Service:    • INGUINAL HERNIA REPAIR ROBOTIC Bilateral 8/10/2016    Procedure: INGUINAL HERNIA REPAIR ROBOTIC with mesh  ;  Surgeon: Francis Fletcher M.D.;  Location: SURGERY [facility];  Service:    • OTHER  2000    anal cyst     Social History     Tobacco Use   • Smoking status: Never Smoker   • Smokeless tobacco: Never Used   Substance Use Topics   • Alcohol use: No     Alcohol/week: 0.0 oz     Comment: 0-2 glasses of wine per month   • Drug use: No     Social History     Social History Narrative    Retired       Family History   Problem Relation Age of Onset   • Other Mother         old age   • Heart Disease Father    • Stroke Father 86         from stroke   • Colon Cancer Father         colon cancer   • Cancer Maternal Uncle   "       prostate   • Alzheimer's Disease Sister    • Cancer Brother         LUNG CANCER     Family Status   Relation Name Status   • Mo 92    • Fa 86    • MUnc     • Sis 81    • Bro 73    • Bro 77 Alive   • MGMo     • MGFa     • PGMo     • PGFa       Allergies: Patient has no known allergies.    ROS  As per HPI above. All other systems reviewed and negative.        Objective   /70   Pulse (!) 48   Temp 36.1 °C (96.9 °F)   Resp 14   Ht 1.791 m (5' 10.5\")   Wt 69.1 kg (152 lb 6.4 oz)   SpO2 98%  Body mass index is 21.56 kg/m².    General: alert and oriented, pleasant, cooperative  HEENT: Normocephalic, atraumatic.   Psychiatric: appropriate mood and affect. Good insight and appropriate judgment     Assessment and Plan   The following treatment plan was discussed     1. Chronic fatigue  Shawanda continues to have chronic fatigue. He does show signs of dementia during his appointment and if his GI work up is un concerning as well this very well could be a more global picture of failure to thrive.     2. Low serum vitamin B12  B12 is still in the low 200s, continue on another 6 months of B12 injections.     3. Elevated serum creatinine  May be a progressive CKD picture, he says he does hydrate well. Recheck prior to his new \[facility]\"" care visit 2019.   - Basic Metabolic Panel; Future    4. Need for vaccination  - INFLUENZA VACCINE, HIGH DOSE (65+ ONLY)    Healthcare maintenance     Health Maintenance Due   Topic Date Due   • IMM INFLUENZA (1) 2019       Return if symptoms worsen or fail to improve.    Varun Scott MD  Internal Medicine  [facility]                   "

## 2019-09-16 NOTE — ASSESSMENT & PLAN NOTE
Continues to have fatigue. Has an appointment with GI on 10/2019. Has noted some thickening on his colon with prior CT imaging.

## 2019-10-21 NOTE — OR NURSING
"Pre-admit appointment completed. \"Preparing for your procedure\" sheet given to pt along with verbal and written instructions.     Pt states he is scheduled to get CT of chest. Given phone number to schedule CT. Pt states his BUN has been elevated. Note elevated creatinine and explain will check on lab today. Enc pt to inform his PCP that labs were drawn so PCP can also follow up. Pt reports itchy skin, bad breath, chronic fatigue, and loss of appetiite with frequent feeling of need to vomit.     Enc pt to follow up with cardiology office as last seen 7/2019 with note to return in 2-3 months. Pt voices understanding.     Dr Lara notified via email of procedure due to hx of aortic stenosis and pulmonary HTN (both noted in last cardiology note).  "

## 2019-10-22 NOTE — OR NURSING
Spoke with Randa Medrano office regarding CT of chest prior to anesthesia. She will speak with Dr Luna's nurse to follow up.

## 2019-10-22 NOTE — OR NURSING
Dr Lara reviewed patients medical history. Based upon information available, Dr Lara asks:     Can you inform me as to why this patient needs a CT of the chest?    Informed Dr Lara: Dr Luna  ordered it with dx of gastric nodules, abdominal bloating, weight loss. Pt thinks to make sure no more nodules elsewhere. Pt has had shortness of breath for past 2 years with increase in past 4 weeks.     Dr Caal replied: If I am understanding this correctly, the CT was ordered by one of Dr. Medrano’ partners with the intention of clarifying shortness of breath in the setting of abdominal nodules. Therefore, it is best that we await for the results of the CT scan prior to this patient being exposed to any type of anesthetic given the potential for pulmonary complications afterwords. Thank you.  Russell Lara M.D.  [facility] note faxed to Dr Medrano office. Office closed for the day, so will call in AM.

## 2019-10-24 NOTE — DISCHARGE INSTRUCTIONS
ENDOSCOPY HOME CARE INSTRUCTIONS    GASTROSCOPY OR ERCP  1. Don't eat or drink anything for about an hour after the test. You can then resume your regular diet.  2. Don't drive or drink alcohol for 24 hours. The medication you received will make you too drowsy.  3. Don't take any coffee, tea, or aspirin products until after you see your doctor. These can harm the lining of your stomach.  4. If you begin to vomit bloody material, or develop black or bloody stools, call your doctor as soon as possible.  5. If you have any neck, chest, abdominal pain or temp of 100 degrees, call your doctor.  6. See your doctor see below  7. Additional instructions:     RECOMMENDATION:    1)f/u pathology  2) f/u with Dr MALAVE  3) further surveillance EGD will be based on pathology  8. Prescriptions: none    Dr Medrano [phone]    You should call 911 if you develop problems with breathing or chest pain.  If any questions arise, call your doctor. If your doctor is not available, please feel free to call [phone]. You can also call the HEALTH HOTLINE open 24 hours/day, 7 days/week and speak to a nurse at [phone], or toll free [phone].    Depression / Suicide Risk    As you are discharged from this [facility] facility, it is important to learn how to keep safe from harming yourself.    Recognize the warning signs:  · Abrupt changes in personality, positive or negative- including increase in energy   · Giving away possessions  · Change in eating patterns- significant weight changes-  positive or negative  · Change in sleeping patterns- unable to sleep or sleeping all the time   · Unwillingness or inability to communicate  · Depression  · Unusual sadness, discouragement and loneliness  · Talk of wanting to die  · Neglect of personal appearance   · Rebelliousness- reckless behavior  · Withdrawal from people/activities they love  · Confusion- inability to concentrate     If you or a loved one observes any of these  behaviors or has concerns about self-harm, here's what you can do:  · Talk about it- your feelings and reasons for harming yourself  · Remove any means that you might use to hurt yourself (examples: pills, rope, extension cords, firearm)  · Get professional help from the community (Mental Health, Substance Abuse, psychological counseling)  · Do not be alone:Call your Safe Contact- someone whom you trust who will be there for you.  · Call your local CRISIS HOTLINE 325-2834 or 263-183-4477  · Call your local Children's Mobile Crisis Response Team [place] (134) 075-0853 or www.[place].com  · Call the toll free National Suicide Prevention Hotlines   · National Suicide Prevention Lifeline 388-038-YATE (8860)  · National Hope Line Network 800-SUICIDE (755-9243)    I acknowledge receipt and understanding of these Home Care Instructions.

## 2019-10-24 NOTE — ANESTHESIA POSTPROCEDURE EVALUATION
Patient: Shawanda Daley Jr.    Procedure Summary     Date:  10/24/19 Room / Location:   ENDOSCOPIC ULTRASOUND ROOM / SURGERY [facility]    Anesthesia Start:  0756 Anesthesia Stop:  0834    Procedures:       GASTROSCOPY- POSSIBLE BIOPSY, DILATION, POLYPECTOMY, CONTROL OF HEMORRHAGE      EGD, WITH ENDOSCOPIC US      EGD, WITH ASPIRATION BIOPSY- FNA Diagnosis:       Gastric polyp      (gastric polyp)    Surgeon:  Ephraim Medrano M.D. Responsible Provider:  Luis Caldwell M.D.    Anesthesia Type:  general ASA Status:  2          Final Anesthesia Type: general  Last vitals  BP   NIBP: 139/78    Temp   36.2 °C (97.2 °F)    Pulse   Heart Rate (Monitored): (!) 52   Resp   15    SpO2   98 %      Anesthesia Post Evaluation    Patient location during evaluation: PACU  Patient participation: complete - patient participated  Level of consciousness: awake and alert  Pain score: 0    Airway patency: patent  Anesthetic complications: no  Cardiovascular status: hemodynamically stable  Respiratory status: acceptable  Hydration status: euvolemic    PONV: none           Nurse Pain Score: 4 (NPRS)

## 2019-10-24 NOTE — ANESTHESIA QCDR
2019 [facility] Clinical Data Registry (for Quality Improvement)     Postoperative nausea/vomiting risk protocol (Adult = 18 yrs and Pediatric 3-17 yrs)- (430 and 463)  General inhalation anesthetic (NOT TIVA) with PONV risk factors: No  Provision of anti-emetic therapy with at least 2 different classes of agents: N/A  Patient DID NOT receive anti-emetic therapy and reason is documented in Medical Record: N/A    Multimodal Pain Management- (AQI59)  Patient undergoing Elective Surgery (i.e. Outpatient, or ASC, or Prescheduled Surgery prior to Hospital Admission): Yes  Use of Multimodal Pain Management, two or more drugs and/or interventions, NOT including systemic opioids: Yes   Exception: Documented allergy to multiple classes of analgesics:  N/A    PACU assessment of acute postoperative pain prior to Anesthesia Care End- Applies to Patients Age = 18- (ABG7)  Initial PACU pain score is which of the following: < 7/10  Patient unable to report pain score: N/A    Post-anesthetic transfer of care checklist/protocol to PACU/ICU- (426 and 427)  Upon conclusion of case, patient transferred to which of the following locations: PACU/Non-ICU  Use of transfer checklist/protocol: Yes  Exclusion: Service Performed in Patient Hospital Room (and thus did not require transfer): N/A    PACU Reintubation- (AQI31)  General anesthesia requiring endotracheal intubation (ETT) along with subsequent extubation in OR or PACU: No  Required reintubation in the PACU: N/A  Extubation was a planned trial documented in the medical record prior to removal of the original airway device: N/A    Unplanned admission to ICU related to anesthesia service up through end of PACU care- (MD51)  Unplanned admission to ICU (not initially anticipated at anesthesia start time): No

## 2019-10-24 NOTE — OP REPORT
DATE OF SERVICE:  10/24/2019     INDICATION FOR PROCEDURE:  gastric antral submucosal nodule    PROCEDURE PERFORMED: EGD with EMR and EUS     CONSENT:  Informed consent was obtained directly from the patient after benefits, risks and possible alternatives were discussed.     MEDICATIONS:  The patient received Propofol for this procedure. Please see the anesthesia record for details     PROCEDURE DESCRIPTION:  The patient was placed in the left lateral decubitus position after sedation and intubation by the anesthesiologist. Her vital signs were monitored continuously throughout the procedure.  When ready, the upper endoscope was placed in the patient's mouth and advanced easily and carefully to the esophagus and subsequently to the stomach and duodenum. The scope was then slowly withdrawn and mucosa examined. Retroflexion was performed in the stomach. The EGD scope was removed.    The EUS scope was then selected. The scope was placed in the mouth and advanced with semi-direct visualization through the oropharynx to the esophagus, stomach and duodenum. Ultrasound images are noted below. The patients toleration of this procedure was excellent.     FINDINGS:    Esophagus: normal    Stomach: mild diffuse flattening of the gastric folds with a 1cm ulcerated antral submucosal nodule identified. This had appearances of a hyperplastic polyp. After the EUS the nodule was raised with O-Rise and then removed using a hot snare. The mucosal defect was closed using one endoclip. The tissue was then collected using a anderson net    Duodenum: normal    EUS: The linear scope was selected. Arising from the gastric wall there was a mildly hypoechoic 9.9 x4.1mm mucosally based lesion in the gastric antrum. This would correlate with the identified nodule on EGD. This looked to be amenable for endoscopic removal. The liver appeared normal. There was no intrahepatic ductal dilation. The aorta, celiac artery and SMA appeared normal. The left  kidney, left adrenal and spleen appeared normal. The pancreas appeared heterogeneous with mild lobularity and hyperechoic lines. These are non-specific changes. The PD measured 0.9mm in the pancreatic body and was normal. The scope was advanced to the duodenal bulb. The duodenal wall appeared normal. The CBD measured 4.6mm and was normal caliber. The portal venous confluence was visualized and appeared normal. The gallbladder was visualized and appeared normal. The PD measured 3.2mm and was normal caliber. Again there was mild hypoechogenicity with heterogeneity, lobularity and hyperechoic lines in the pancreatic head. A discreet mass was not visualized. The scope was advanced to D2 and the uncinate appeared normal. The scope was withdrawn to the esophagus. The esophageal wall was normal. There was no lymphadenopathy present. The heart valves appeared grossly normal.        COMPLICATIONS:  No complications or blood loss during or in the immediate postoperative period.     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 This was removed via EMR technique and collected for pathology review.      RECOMMENDATION:    1)f/u pathology  2) f/u with Dr MALAVE  3) further surveillance EGD will be based on pathology  4) discharge home today

## 2019-10-24 NOTE — OR NURSING
"0833 To PACU from OR via gurney, side rails up x 2 for safety, lungs clear bilaterally, lying tilted to L side. Hypotensive BP reported to Dr Caldwell; +2 B radial pulses with pink/warm skin noted to extremities. Dr Caldwell giving \"ephedrine-like\" medication to pt. IVF opened to gravity. Breathing easy and unlabored.   0845 No changes  0855 Pt arouses to voice and touch; pt opens eyes and slightly lifts head from pillow. NPA removed from oral airway; breathing remains easy and unlabored. Pt whispers to RN and difficult to understand; eyes closed when answering. Placed warm blankets over patient for comfort.   0910 Pt arouses easily to voice and denies pain or nausea.    0925 Pt awake and denies pain or nausea. Breathing easy and unlabored.   0939 Dr Medrano here and updated patient. Pt denies pain or nausea. Pt asking \"when can I get out of here?\". Pt agrees that he feels ready to go home.   0955 Meets criteria for transfer to stage II.     "

## 2019-10-24 NOTE — ANESTHESIA TIME REPORT
Anesthesia Start and Stop Event Times     Date Time Event    10/24/2019 0723 Ready for Procedure     0756 Anesthesia Start        Responsible Staff  10/24/19    Name Role Begin End    Luis Caldwell M.D. Anesth 0756         Preop Diagnosis (Free Text):  Pre-op Diagnosis     ABNORMAL CT SCAN, GASTRIC NODULE        Preop Diagnosis (Codes):    Post op Diagnosis  Gastric nodule  ABNORMAL CT SCAN, GASTRIC NODULE    Premium Reason  Non-Premium    Comments:

## 2019-10-24 NOTE — OR NURSING
0913 Patient to stage 2. Up and dressed. Vital signs taken. Wife at chairside.   1020 Patient and family given discharge instructions. Verbalizes understanding. No further questions or concerns.

## 2019-10-24 NOTE — ANESTHESIA PREPROCEDURE EVALUATION
Relevant Problems   NEURO   (+) History of kidney stones      CARDIAC   (+) Mild aortic stenosis by prior echocardiogram   (+) Pulmonary hypertension (HCC)       Physical Exam    Airway   Mallampati: III  TM distance: >3 FB  Neck ROM: full       Cardiovascular - normal exam  Rhythm: regular  Rate: normal     Dental - normal exam         Pulmonary - normal exam  Breath sounds clear to auscultation     Abdominal    Neurological - normal exam         Other findings: Aortic stenotic murmer 3/6, SOA            Anesthesia Plan    ASA 2       Plan - general       Airway plan will be natural airway        Induction: intravenous    Postoperative Plan: Postoperative administration of opioids is intended.    Pertinent diagnostic labs and testing reviewed    Informed Consent:    Anesthetic plan and risks discussed with patient.

## 2019-11-14 PROBLEM — R41.89 COGNITIVE CHANGES: Status: ACTIVE | Noted: 2019-01-01

## 2019-11-14 PROBLEM — Z99.89 WALKER AS AMBULATION AID: Status: ACTIVE | Noted: 2019-01-01

## 2019-11-14 PROBLEM — H61.23 BILATERAL IMPACTED CERUMEN: Status: ACTIVE | Noted: 2019-01-01

## 2019-11-14 PROBLEM — E61.1 IRON DEFICIENCY: Status: ACTIVE | Noted: 2019-01-01

## 2019-11-14 PROBLEM — Z51.81 MEDICATION MONITORING ENCOUNTER: Status: ACTIVE | Noted: 2019-01-01

## 2019-11-14 PROBLEM — H91.90 IMPAIRED HEARING: Status: ACTIVE | Noted: 2019-01-01

## 2019-11-14 PROBLEM — R79.89 ABNORMAL TSH: Status: ACTIVE | Noted: 2019-01-01

## 2019-11-14 PROBLEM — R29.898 WEAKNESS OF BOTH LEGS: Status: ACTIVE | Noted: 2019-01-01

## 2019-11-14 NOTE — PATIENT INSTRUCTIONS
Shawanda was seen today for establish care.    Diagnoses and all orders for this visit:    Bilateral impacted cerumen    Benign prostatic hyperplasia, unspecified whether lower urinary tract symptoms present    Cognitive changes  -     REFERRAL TO NEUROLOGY    Weakness of both legs  -     REFERRAL TO NEUROLOGY    Walker as ambulation aid  -     REFERRAL TO NEUROLOGY    Routine screening for STI (sexually transmitted infection)  -     HEPATITIS PANEL ACUTE(4 COMPONENTS); Future  -     Miscellaneous Test; Future    Hyperglycemia  -     HEMOGLOBIN A1C; Future    Medication monitoring encounter  -     Comp Metabolic Panel; Future    Other fatigue  -     CBC WITH DIFFERENTIAL; Future  -     TSH WITH REFLEX TO FT4; Future    Abnormal TSH  -     TSH WITH REFLEX TO FT4; Future    Iron deficiency  -     IRON/TOTAL IRON BIND; Future  -     FERRITIN; Future    Vitamin D deficiency  -     VITAMIN D,25 HYDROXY; Future    Vitamin B12 deficiency  -     VITAMIN B12; Future    Hypomagnesemia  -     MAGNESIUM; Future    Encounter for hepatitis C screening test for low risk patient  -     HEPATITIS PANEL ACUTE(4 COMPONENTS); Future    Other specified hearing loss of both ears  -     REFERRAL TO AUDIOLOGY    Abdominal hernia without obstruction and without gangrene, recurrence not specified, unspecified hernia type    -We will refer him for neurology evaluation for the bilateral leg weakness and cognitive decline and they should call you number week but if they do not please call the number on the sheet and we will also refer for audiology hearing screen.  Continue to see your urologist, ophthalmologist and gastroenterologist for his abdominal hernia.  No surgery indicated right now.  We will do bilateral ear lavage of his ears.  At least 1 week before you see me please get fasting lab work 8 hours of no eating but drink plenty of water.  He will continue Flomax daily for his BPH and is being followed by urology for this.  ER precautions  given if any chest pain, shortness of breath, passing out then please go to the ER or call 911 otherwise we will see him back in about 8 weeks to go over his lab work and see what the referrals say and also because of his long-term history of vitamin B12 deficiency where he was getting injections before we will also get some of his vitamins screening as well.    Return in about 8 weeks (around 1/9/2020), or fu labs/leg weakness/cognitive decline.

## 2019-11-14 NOTE — PROGRESS NOTES
cc: Establish care, bilateral leg weakness, cognitive decline, medication monitoring, BPH stable,    Subjective:     Shawanda Daley Jr. is a 84 y.o. male presenting Retired .  Lives with his wife.  He is using a walker but not having any falls or head trauma.  No memory concerns.  He is having some hearing concerns.  He was in the ER August 2019 for abdominal discomfort and a bulge.  ED evaluation of the abdominal pain demonstrated a fat-containing supraumbilical ventral hernia and he was unable to reduce this in the emergency department however no CT evidence of intestinal involvement.  Since the discomfort was mild and all other labs were within normal limits he was supposed to follow-up outpatient.  He has already had an abdominal surgery with a mesh placed in he already saw the general surgeon for this after the ER visit but they wanted to do some preop by seeing the vascular and cardiologist and they felt to wait since it is not causing any major issues and is not in the intestines they wanted him to wait.  He did have a sleep apnea test and this was normal.  He does have history of BPH and is seeing a urologist and is taking Flomax daily for this.  They have noticed some cognitive decline from previous physicians.  And recent onset of bilateral weakness in legs of unknown etiology using a walker.    Review of systems:     Constitutional: Negative for fever, chills and positive fatigue.   HENT: Negative for sinus pressure, negative for ear pain or positive hearing loss  Eyes: Negative for blurriness, negative for double vision  Respiratory: Negative for cough and shortness of breath, negative for exertional shortness of breath  Cardiovascular: Negative for leg swelling, negative for palpitations, negative for chest pain  Gastrointestinal: Negative for nausea, vomiting, abdominal pain, constipation and diarrhea.  Genitourinary: Negative for dysuria and hematuria.   Skin: Negative for rash.  "  Neurological: Negative for dizziness, positive bilateral leg weakness and negative headaches.   Endo/Heme/Allergies: Denies bleeding, bruising, and recurrent allergies.  Psychiatric/Behavioral: Negative for depression and anxiety.        Current Outpatient Medications:   •  tamsulosin (FLOMAX) 0.4 MG capsule, tamsulosin 0.4 mg capsule  Take 1 capsule every day by oral route., Disp: , Rfl:     Allergies, past medical history, past surgical history, family history, social history reviewed and updated    Objective:     Vitals: /62 (BP Location: Left arm, Patient Position: Sitting, BP Cuff Size: Adult)   Pulse 68   Temp 36.4 °C (97.6 °F) (Temporal)   Resp 18   Ht 1.791 m (5' 10.5\")   Wt 72 kg (158 lb 12.8 oz)   SpO2 92%   BMI 22.46 kg/m²   General: Alert, pleasant, NAD  HEENT: Normocephalic.  Nontraumatic. EOMI, no icterus or pallor.  Conjunctivae and lids normal. External ears normal. Oropharynx non-erythematous, mucous membranes moist.  Neck supple.  No thyromegaly or masses palpated. No cervical or supraclavicular lymphadenopathy.  Bilateral cerumen impacting bilateral TMs visualized.  Heart: Regular rate and rhythm.  S1 and S2 normal.  No murmurs appreciated.  Respiratory: Normal respiratory effort.  Clear to auscultation bilaterally.  Abdomen: Non-distended, soft, non tender in all 4 quadrants.  Skin: Warm, dry, no rashes.  Musculoskeletal: Gait is normal.  Moves all extremities well.  Extremities: No leg edema.  Pedal pulses 2+ symmetric.   Psych:  Affect/mood is mildly anxious, judgement is moderate, memory is decreased, grooming is appropriate.    Assessment/Plan:     Shawanda was seen today for establish care.    Diagnoses and all orders for this visit:    Bilateral impacted cerumen    Benign prostatic hyperplasia, unspecified whether lower urinary tract symptoms present    Cognitive changes  -     REFERRAL TO NEUROLOGY    Weakness of both legs  -     REFERRAL TO NEUROLOGY    Walker as ambulation " aid  -     REFERRAL TO NEUROLOGY    Routine screening for STI (sexually transmitted infection)  -     HEPATITIS PANEL ACUTE(4 COMPONENTS); Future  -     Miscellaneous Test; Future    Hyperglycemia  -     HEMOGLOBIN A1C; Future    Medication monitoring encounter  -     Comp Metabolic Panel; Future    Other fatigue  -     CBC WITH DIFFERENTIAL; Future  -     TSH WITH REFLEX TO FT4; Future    Abnormal TSH  -     TSH WITH REFLEX TO FT4; Future    Iron deficiency  -     IRON/TOTAL IRON BIND; Future  -     FERRITIN; Future    Vitamin D deficiency  -     VITAMIN D,25 HYDROXY; Future    Vitamin B12 deficiency  -     VITAMIN B12; Future    Hypomagnesemia  -     MAGNESIUM; Future    Encounter for hepatitis C screening test for low risk patient  -     HEPATITIS PANEL ACUTE(4 COMPONENTS); Future    Other specified hearing loss of both ears  -     REFERRAL TO AUDIOLOGY    Abdominal hernia without obstruction and without gangrene, recurrence not specified, unspecified hernia type    -We will refer him for neurology evaluation for the bilateral leg weakness and cognitive decline and they should call you number week but if they do not please call the number on the sheet and we will also refer for audiology hearing screen.  Continue to see your urologist, ophthalmologist and gastroenterologist for his abdominal hernia.  No surgery indicated right now.  We will do bilateral ear lavage of his ears.  At least 1 week before you see me please get fasting lab work 8 hours of no eating but drink plenty of water.  He will continue Flomax daily for his BPH and is being followed by urology for this.  ER precautions given if any chest pain, shortness of breath, passing out then please go to the ER or call 911 otherwise we will see him back in about 8 weeks to go over his lab work and see what the referrals say and also because of his long-term history of vitamin B12 deficiency where he was getting injections before we will also get some of his  vitamins screening as well.  There is concern from just eating him the first time today for cognitive and memory concerns.  Multiple times when he heard what I was saying, he forgot what I had mentioned and other physicians have noticed this in the past.,  He was also agitated that I did not address the weakness of his legs and I reported that I did not we will need to do a neurological referral as he is already seen [facility] [place] and has done EMGs and has done multiple studies and nothing has been found wrong so there is a major concern for cognitive decline and his wife in the room understood this and was trend explained this to him as well to up the importance for the neurology referral.    Return in about 8 weeks (around 1/9/2020), or fu labs/leg weakness/cognitive decline.

## 2019-11-14 NOTE — TELEPHONE ENCOUNTER
NEW PATIENT VISIT PRE-VISIT PLANNING    1.  [employer] Patient is checked in Patient Demographics? YES    2.  Immunizations were updated in [facility] using WebIZ?: Yes       •  Web Iz Recommendations: TD, VARICELLA (Chicken Pox)  and SHINGRIX (Shingles)    3.  Is this appointment scheduled as a Hospital Follow-Up? No    4.  Patient is due for the following Health Maintenance Topics:   There are no preventive care reminders to display for this patient.    5. Orders for overdue Health Maintenance topics pended in Pre-Charting? NO    6.  Reviewed/Updated the following with patient:   •   Preferred Pharmacy? YES       •   Preferred Lab? YES       •   Preferred Communication? YES       •   Allergies? YES       •   Medications? YES. Was Abstract Encounter opened and chart updated? YES       •   Social History? YES. Was Abstract Encounter opened and chart updated? YES       •   Family History (document living status of immediate family members and if + hx of cancer, diabetes, hypertension, hyperlipidemia, heart attack, stroke) YES. Was Abstract Encounter opened and chart updated? YES    7.  Updated Care Team?       •   DME Company (gait device, O2, CPAP, etc.) YES       •   Other Specialists (eye doctor, derm, GYN, cardiology, endo, etc): YES    8.  Patient was informed to arrive 15 min prior to their   scheduled appointment and bring in their medication bottles.

## 2019-12-04 NOTE — PROGRESS NOTES
"Weakness in both legs   Swelling in both feet  Multiple near falls    HPI  83 yo male with 2 years of difficultly walking   Had b12 deficiency an got b12 shots but was not feeling any better.  He went to vascular surgeon and checked his circulation   He was told everything was OK however he still endorses color change below both knees together with \" feeling cold to touch\" and his feet being swollen.  He also reports pins and needles in both feet.  EMG/NCS was supposed to have the test but did not get it.  He has multiple near falls and unsteady gait.    Lumbar disk disease is causing back pain and difficulty bending down.    Cognitive dysfunction - he states his memory is good and endorses no complaints there.  No parkinsonism. No behavioral changes.  No speech or vision changes.    B12 most recently was not checked however in the past he did have low B12 and was taking supplements,  These symptoms have been ongoing for past 6 months and he had been communication with his PCP and Vascular specialists.    Used to work as an ,  Lives with his wife.  Walks with a walker due to leg weakness and unsteady gait.  He recently had lumbar spine mRI which revealed disk disease but no compression and some foraminal narrowing at L2-L3  AND l4-l5   Review of Systems   Constitutional: Negative.    HENT: Positive for hearing loss.    Eyes: Negative.    Respiratory: Negative.    Cardiovascular: Negative.    Genitourinary: Positive for frequency and urgency.   Musculoskeletal: Positive for back pain and falls.   Skin: Negative.    Neurological: Positive for tingling, sensory change, focal weakness and weakness.   Endo/Heme/Allergies: Negative.    Psychiatric/Behavioral: Negative.      Past Medical History:   Diagnosis Date   • Aortic stenosis     mild per cardiology note. Followed by [facility] cardiology.   • Bowel habit changes 10/21/2019    constipation & diarrhea   • BPH (benign prostatic hyperplasia) 3/4/2016   • Breath " "shortness     10/21/19-started 2yrs ago but has worsened in past 4 weeks. Increases with exertion.    • Cataract     fabiola lens surgery   • Chronic fatigue    • Edema of both feet 10/21/2019    and legs with redness. Denies open sores   • Hernia of abdominal wall     10/21/19-above umbilicus and pt states above previous hernia mesh & was told fatty and to push back in prn.    • History of anemia    • Indigestion     no meds   • Loss of appetite    • Low serum vitamin B12 3/15/2019   • Pulmonary hypertension (HCC)     followed by [facility]ology   • Renal disorder 2015    kidney stone.    • Renal disorder 10/21/2019    \"weak kidney\".  Elevated creatinine on labs   • Stomach pain 10/21/2019   • Urinary incontinence      Past Surgical History:   Procedure Laterality Date   • GASTROSCOPY-ENDO  10/24/2019    Procedure: GASTROSCOPY- POSSIBLE BIOPSY, DILATION, POLYPECTOMY, CONTROL OF HEMORRHAGE;  Surgeon: Ephraim Medrano M.D.;  Location: [facility];  Service: Gastroenterology   • EGD W/ENDOSCOPIC ULTRASOUND  10/24/2019    Procedure: EGD, WITH ENDOSCOPIC US;  Surgeon: Ephraim Medrano M.D.;  Location: [facility];  Service: Gastroenterology   • EGD WITH ASP/BX  10/24/2019    Procedure: EGD, WITH ASPIRATION BIOPSY- FNA;  Surgeon: Ephraim Medrano M.D.;  Location: [facility];  Service: Gastroenterology   • ENDOSCOPY-ESOPH/STOMACH  10/02/2019   • COLONOSCOPY  2018   • BREAST BIOPSY Left 11/18/2016    Procedure: BREAST BIOPSY;  Surgeon: Adrianne Dobbins M.D.;  Location: SURGERY [facility];  Service:    • INGUINAL HERNIA REPAIR ROBOTIC Bilateral 8/10/2016    Procedure: INGUINAL HERNIA REPAIR ROBOTIC with mesh  ;  Surgeon: Francis Fletcher M.D.;  Location: SURGERY [facility];  Service:    • COLONOSCOPY  2013   • CYST EXCISION  2000    anal cyst     Current Outpatient Medications on File Prior to Visit   Medication Sig Dispense Refill   • tamsulosin (FLOMAX) 0.4 MG capsule " tamsulosin 0.4 mg capsule   Take 1 capsule every day by oral route.       No current facility-administered medications on file prior to visit.      Social History     Socioeconomic History   • Marital status:      Spouse name: Not on file   • Number of children: Not on file   • Years of education: Not on file   • Highest education level: Not on file   Occupational History   • Not on file   Social Needs   • Financial resource strain: Not on file   • Food insecurity:     Worry: Not on file     Inability: Not on file   • Transportation needs:     Medical: Not on file     Non-medical: Not on file   Tobacco Use   • Smoking status: Never Smoker   • Smokeless tobacco: Never Used   Substance and Sexual Activity   • Alcohol use: No     Alcohol/week: 0.0 oz     Binge frequency: Never   • Drug use: No   • Sexual activity: Never     Partners: Female   Lifestyle   • Physical activity:     Days per week: Not on file     Minutes per session: Not on file   • Stress: Not on file   Relationships   • Social connections:     Talks on phone: Not on file     Gets together: Not on file     Attends Jain service: Not on file     Active member of club or organization: Not on file     Attends meetings of clubs or organizations: Not on file     Relationship status: Not on file   • Intimate partner violence:     Fear of current or ex partner: Not on file     Emotionally abused: Not on file     Physically abused: Not on file     Forced sexual activity: Not on file   Other Topics Concern   • Not on file   Social History Narrative    Retired .  Lives with his wife.  He is using a walker but not having any falls or head trauma.  No memory concerns.  He is having some hearing concerns.  He was hospitalized in 2019.     Family History   Problem Relation Age of Onset   • Other Mother         old age   • Heart Disease Father    • Stroke Father 86         from stroke   • Colon Cancer Father         colon cancer   • Cancer  "Maternal Uncle         prostate   • Alzheimer's Disease Sister    • Cancer Brother         LUNG CANCER   • Other Brother         on a blood thinner medication     No Known Allergies     Encounter Vitals  Standard Vitals  Vitals  Blood Pressure : 128/60  Temperature: 36.4 °C (97.6 °F)  Temp src: Temporal  Pulse: (!) 58  Respiration: 16  Pulse Oximetry: 92 %  Height: 179.1 cm (5' 10.5\")  Weight: 73.9 kg (163 lb)  Encounter Vitals  Temperature: 36.4 °C (97.6 °F)  Temp src: Temporal  Blood Pressure : 128/60  Pulse: (!) 58  Respiration: 16  Pulse Oximetry: 92 %  Weight: 73.9 kg (163 lb)  Height: 179.1 cm (5' 10.5\")  BMI (Calculated): 23.06  Pulmonary-Specific Vitals     Durable Medical Equipment-Specific Vitals       Exam   Hypoesthesia in bilateral LE   Below knees  Decreased to PP P and V   Erythema in bilateral LE   Walks with walker   Motor 4/5 in both legs   5-/5 in both arms   Cn 2-12 intact   Alert and awake   Pleasant   Coordination intact finger to nose   Heel to shin can not complete   Awake and alert x 4   Registration 5/5  Recall 4/5  Orientation 5/5  Naming intact   Some pre  Imaging   Reviewed MRI L spine   Mild to moderate foraminal stenosis   No cord compression     Lab Results   Component Value Date/Time    SODIUM 140 10/21/2019 12:40 PM    POTASSIUM 5.3 10/21/2019 12:40 PM    CHLORIDE 112 10/21/2019 12:40 PM    CO2 21 10/21/2019 12:40 PM    GLUCOSE 94 10/21/2019 12:40 PM    BUN 44 (H) 10/21/2019 12:40 PM    CREATININE 2.09 (H) 10/21/2019 12:40 PM      Lab Results   Component Value Date/Time    WBC 9.1 10/21/2019 12:40 PM    RBC 4.10 (L) 10/21/2019 12:40 PM    HEMOGLOBIN 12.0 (L) 10/21/2019 12:40 PM    HEMATOCRIT 38.4 (L) 10/21/2019 12:40 PM    MCV 93.7 10/21/2019 12:40 PM    MCH 29.3 10/21/2019 12:40 PM    MCHC 31.3 (L) 10/21/2019 12:40 PM    MPV 9.7 10/21/2019 12:40 PM    NEUTSPOLYS 56.60 09/06/2019 11:49 AM    LYMPHOCYTES 31.00 09/06/2019 11:49 AM    MONOCYTES 10.50 09/06/2019 11:49 AM    EOSINOPHILS " 1.00 09/06/2019 11:49 AM    BASOPHILS 0.50 09/06/2019 11:49 AM        Impression and plan   Bilateral LE edema, color change and below the knees paresthesias   Possible venous insufficiency vs lymph circulation problem vs peripheral neuropathy  Lumbar disk disease and foraminal stenosis may contribute to his leg weakness and pain.  Plan  MRI brain w/o contrast   EMG/NCS to r/o neuropathy/myopathy   Immunofixation  B12, methylmalonic acid  TSH  A1C   Physical therapy with gait training recommended  Gabapentin 100 mg nightly and potentially increase the dose  RTC after above

## 2019-12-06 NOTE — PROCEDURES
NERVE CONDUCTION STUDIES AND ELECTROMYOGRAPHY REPORT  [facility]  12/06/19           IMPRESSION:  This is an abnormal electrodiagnostic study due to evidence of a severe, mixed, length dependent, sensorimotor polyneuropathy.  An underlying right median neuropathy at the wrist (carpal tunnel syndrome) or right ulnar neuropathy cannot be excluded. There is no evidence of lumbosacral radiculopathy in the bilateral lower extremities. Recommend clinical correlation of the above.        Caren Sosa MD  Neurology - Neurophysiology  [facility]      REASON FOR REFERRAL:  Mr. Shawanda Daley Jr. 84 y.o. referred by Dr. Sylvain Crowley for evaluation of progressive sensory symptoms starting in the bilateral lower extremities with advancement to the bilateral upper extremities over the past 2 years.  Patient has a history of B12 deficiency.  Symptoms were relatively acute onset described as burning.  He has since had difficulty ambulating.    Height: 5'11  Weight: 159 lbs    ELECTRODIAGNOSTIC EXAMINATION:  Nerve conduction studies (NCS) and electromyography (EMG) are utilized to evaluate direct or indirect damage to the peripheral nervous system. NCS are performed to measure the nerve(s) response(s) to electrostimulation across a given nerve segment. EMG evaluates the passive and active electrical activity of the muscle(s) in question.  Muscles are innervated by specific peripheral nerves and roots. Often times, several nerves the muscle to be examined in order to determine the presence or absence of the disease process. Furthermore, nerves and muscles may need to be tested in a [id number] comparison, as well as in additional extremities, as this may be crucial in characterizing the extent of the disease process, which may be diffuse or isolated and of varying degree of severity. The extent of the neurodiagnostic exam is justified as it may help arrive to a proper diagnosis, which ultimately  may contribute to better management of the patient. Therefore, the nerves to muscles examined during the study were medically necessary.    Unless otherwise noted, temperature of the extremity(s) study was monitored before and during the examination and remained between 32 and 36 degrees C for the upper extremities, and between 30 and 36 degrees C for the lower extremities. The patient tolerated testing well, without any complications.       NERVE CONDUCTION STUDY SUMMARY:  Selected nerves of the bilateral lower extremity and right upper extremity are studied.    Unobtainable right median sensory response.  Abnormal right median motor response due to low amplitude, prolonged distal latency and slowing.  Unobtainable right ulnar sensory response.    Abnormal right ulnar motor response due to low amplitude, prolonged distal latency and slowing.  Abnormal right radial sensory response due to low amplitude.  Unobtainable bilateral sural sensory responses.  Unobtainable bilateral common peroneal motor responses at the extensor digitorum brevis.   Abnormal bilateral common peroneal motor responses at the tibialis anterior due to low amplitude, prolonged distal latency and slowing in the left common peroneal nerve.  Unobtainable bilateral tibial motor responses at the abductor hallucis brevis.      NEEDLE EMG SUMMARY:  Concentric needle study of selected right upper and lower extremity muscles is performed.     Insertion is normal in all muscles sampled.   Large amplitude prolonged duration motor unit potentials appreciated in the right abductor pollicis brevis.  With activation, there are normal morphology (amplitude/duration) motor unit action potentials firing with normal recruitment and remaining muscles tested.       PATIENT DATA TABLES    Nerve Conduction Studies     Stim Site NR Onset (ms) Norm Onset (ms) O-P Amp (µV) Norm O-P Amp Site1 Site2 Delta-P (ms) Dist (cm) Teto (m/s) Norm Teto (m/s)   Right Median Anti  Sensory (2nd Digit)  34.9°C   Wrist *NR  <3.8  >10 Wrist 2nd Digit  14.0  >50   Right Radial Anti Sensory (Base 1st Digit)  34.9°C   Wrist    2.8 <2.8 *5.2 >10 Wrist Base 1st Digit 3.9 0.0  >50   Left Sural Anti Sensory (Lat Mall)  34.9°C   Calf *NR  <4.6  >3 Calf Lat Mall  14.0  >40   Right Sural Anti Sensory (Lat Mall)  34.9°C   Calf *NR  <4.6  >3 Calf Lat Mall  14.0  >40   Right Ulnar Anti Sensory (5th Digit)  34.9°C   Wrist *NR  <3.2  >5 Wrist 5th Digit  14.0  >50        Stim Site NR Onset (ms) Norm Onset (ms) O-P Amp (mV) Norm O-P Amp Site1 Site2 Delta-0 (ms) Dist (cm) Teto (m/s) Norm Teto (m/s)   Right Median Motor (Abd Poll Brev)  34.9°C   Wrist    *8.0 <4 *4.1 >5 Elbow Wrist 9.4 29.0 *31 >50   Elbow    17.4  3.6          Left Peroneal EDB Motor (Ext Dig Brev)  34.9°C    Edema at ankle   Ankle *NR  <6  >2.5 B Fib Ankle  0.0  >40   B Fib *NR             Right Peroneal EDB Motor (Ext Dig Brev)  34.9°C    Edema at ankle   Ankle *NR  <6  >2.5 B Fib Ankle  0.0  >40   B Fib *NR             Left Peroneal TA Motor (AntTibialis)  34.9°C   Fib Head    *8.8 <4.5 *1.9 >3 Poplit Fib Head 3.2 10.0 *31 >40   Poplit    12.0  1.5          Right Peroneal TA Motor (AntTibialis)  34.9°C   Fib Head    *10.1 <4.5 *1.5 >3 Poplit Fib Head 2.5 10.0 40 >40   Poplit    12.6  1.2          Left Tibial Motor (Abd Casarez Brev)  34.9°C    Edema at ankle   Ankle *NR  <6  >4 Knee Ankle  0.0  >40   Knee *NR             Right Tibial Motor (Abd Casarez Brev)  34.9°C    Edema at ankle   Ankle *NR  <6  >4 Knee Ankle  0.0  >40   Knee *NR             Right Ulnar Motor (Abd Dig Min)  34.9°C   Wrist    *5.6 <3.1 *3.2 >7 B Elbow Wrist 6.7 21.5 *32 >50   B Elbow    12.3  2.9  A Elbow B Elbow 3.2 10.0 31    A Elbow    15.5  2.4                                             Electromyography     Side Muscle Nerve Root Ins Act Fibs Psw Amp Dur Poly Recrt Int Pat Comment   Right AntTibialis Dp Br Fibular L4-5 Nml Nml Nml Nml Nml 0 Nml Nml    Right Gastroc Tibial S1-2  Nml Nml Nml Nml Nml 0 Nml Nml    Right VastusLat Femoral L2-4 Nml Nml Nml Nml Nml 0 Nml Nml    Right GluteusMed SupGluteal L5-S1 Nml Nml Nml Nml Nml 0 Nml Nml    Right VastusMed Femoral L2-4 Nml Nml Nml Nml Nml 0 Nml Nml    Right 1stDorInt Ulnar C8-T1 Nml Nml Nml Nml Nml 0 Nml Nml    Right PronatorTeres Median C6-7 Nml Nml Nml Nml Nml 0 Nml Nml    Right Biceps Musculocut C5-6 Nml Nml Nml Nml Nml 0 Nml Nml    Right Triceps Radial C6-7-8 Nml Nml Nml Nml Nml 0 Nml Nml    Right Deltoid Axillary C5-6 Nml Nml Nml Nml Nml 0 Nml Nml    Right Abd Poll Brev Median C8-T1 Nml Nml Nml *Incr *>12ms 0 Nml Nml

## 2019-12-19 PROBLEM — A04.8 H. PYLORI INFECTION: Status: ACTIVE | Noted: 2019-01-01

## 2019-12-19 NOTE — PROGRESS NOTES
cc: H. pylori infection, cognitive change    Subjective:     Shawanda Daley Jr. is a 84 y.o. male presenting   I last saw him November 14 and he is going to do his lab work in January and see me back in January.  He did see neurology for his cognitive decline December 3 and he will see them back in 3 months.  And he wants to hold off on the audiology screening.  He has been having stomach pain for the last 6 months and he did see the gastroenterologist and 1 week ago he was diagnosed with H. pylori and he was given double antibiotic medication therapy but he kept vomiting this up.  He is staying hydrated.  He is having some diarrhea.  Kept repeating the same thing as there is cognitive changes but he is here with his wife who is helping him    Review of systems:     Constitutional: Negative for fever, chills and positive fatigue.   HENT: Negative for sinus pressure  Respiratory: Negative for cough and shortness of breath, negative for exertional shortness of breath  Cardiovascular: Negative for leg swelling, negative for palpitations, negative for chest pain  Gastrointestinal: Positive for nausea, negative vomiting, positive abdominal pain, negative constipation and positive diarrhea.  Genitourinary: Negative for dysuria and hematuria.   Skin: Negative for rash.   Neurological: Negative for dizziness, focal weakness and headaches.   Endo/Heme/Allergies: Denies bleeding, bruising, and recurrent allergies.      Current Outpatient Medications:   •  clarithromycin (BIAXIN) 500 MG Tab, Take 500 mg by mouth 2 times a day. For 14 days, Disp: , Rfl:   •  amoxicillin (AMOXIL) 500 MG Cap, Take 1,000 mg by mouth 2 Times a Day. For 2 weeks, Disp: , Rfl:   •  omeprazole (PRILOSEC) 20 MG delayed-release capsule, Take 1 Cap by mouth 2 times a day for 42 days., Disp: 84 Cap, Rfl: 0  •  bismuth subsalicylate (PEPTO-BISMOL) 262 MG Chew Tab, Take 2 Tabs by mouth 4 Times a Day,Before Meals and at Bedtime for 14 days., Disp: 112 Tab, Rfl:  "0  •  ondansetron (ZOFRAN ODT) 4 MG TABLET DISPERSIBLE, Take 1 Tab by mouth every 6 hours as needed for Nausea., Disp: 30 Tab, Rfl: 0  •  gabapentin (NEURONTIN) 100 MG Cap, Take 1 Cap by mouth every evening., Disp: 90 Cap, Rfl: 3  •  tamsulosin (FLOMAX) 0.4 MG capsule, tamsulosin 0.4 mg capsule  Take 1 capsule every day by oral route., Disp: , Rfl:     Allergies, past medical history, past surgical history, family history, social history reviewed and updated    Objective:     Vitals: /78 (BP Location: Right arm, Patient Position: Sitting, BP Cuff Size: Adult)   Temp 35.8 °C (96.5 °F) (Temporal)   Resp 14   Ht 1.791 m (5' 10.5\")   Wt 75.6 kg (166 lb 9.6 oz)   BMI 23.57 kg/m²   General: Alert, pleasant, NAD  HEENT: Normocephalic.  Nontraumatic. EOMI, no icterus or pallor.  Conjunctivae and lids normal. External ears normal.   Heart: Regular rate and rhythm.  S1 and S2 normal.  No murmurs appreciated.  Respiratory: Normal respiratory effort.  Clear to auscultation bilaterally.  Abdomen: Non-distended, soft, positive tenderness on palpation in all quadrants of his abdomen.  Skin: Warm, dry, no rashes.  Musculoskeletal: Gait is slow but steady with walker.  Moves all extremities well.  Extremities: No leg edema.  Pedal pulses 2+ symmetric.   Psych:  Affect/mood is normal, judgement is good, memory is intact, grooming is appropriate.    Assessment/Plan:     Diagnoses and all orders for this visit:    H. pylori infection  -     omeprazole (PRILOSEC) 20 MG delayed-release capsule; Take 1 Cap by mouth 2 times a day for 42 days.  -     bismuth subsalicylate (PEPTO-BISMOL) 262 MG Chew Tab; Take 2 Tabs by mouth 4 Times a Day,Before Meals and at Bedtime for 14 days.    Nausea  -     ondansetron (ZOFRAN ODT) 4 MG TABLET DISPERSIBLE; Take 1 Tab by mouth every 6 hours as needed for Nausea.    Functional diarrhea    Weakness of both legs    Cognitive changes    - And to please continue medications to treat this that was " sent by the GI doctor and if not please make appointment to see the GI doctor.  Please take omeprazole 20 mg every 12 hours for 6 weeks.  Please take clarithromycin 500 mg take 1 tab twice daily for 14 days.  Please take amoxicillin 500 mg 2 capsules twice daily for 14 days and please take bismuth Pepto-Bismol  262 mg every 6 hours for 14 days.  Also due to the nausea and diarrhea please do probiotics over-the-counter and  the Pepto-Bismol over-the-counter if they do not cover it and also I will send some nausea medication Zofran if you are getting too nauseous.  Please try to take with some probiotics to prevent any yeast infection that antibiotics can cause.  Also bismuth aka Pepto Bismol can cause constipation and black discoloration of mouth and stools which is normal.    Please keep your follow-up appointment and we will discuss your other lab work in person.  -For the diarrhea please try probiotics once to twice daily over-the-counter as above and also Pepto-Bismol 4 times a day before meals.  Please stay hydrated and drink plenty of water and for the nausea could try the Zofran but please try to finish the 2 weeks of the antibiotic therapy and then continue omeprazole 20 mg twice daily for 6 weeks and hopefully this will help and if not then please go see the gastroenterologist in person again and please follow-up with neurology in 3 months and please talk to your doctor who is giving you the gabapentin and let them know that you need to do the antibiotics and whether they want you to hold off on this treatment for another 2 weeks and please get your lab work done in January and have your follow-up appointment with me in January.  Neurology wants him to come back in 3 months.  Return in about 1 month (around 1/21/2020), or fu labs.

## 2019-12-26 PROBLEM — N18.9 ACUTE KIDNEY INJURY SUPERIMPOSED ON CHRONIC KIDNEY DISEASE (HCC): Status: ACTIVE | Noted: 2019-01-01

## 2019-12-26 PROBLEM — T78.40XA ALLERGIC REACTION: Status: ACTIVE | Noted: 2019-01-01

## 2019-12-26 PROBLEM — R06.2 WHEEZING: Status: ACTIVE | Noted: 2019-01-01

## 2019-12-26 PROBLEM — N17.9 ACUTE KIDNEY INJURY SUPERIMPOSED ON CHRONIC KIDNEY DISEASE (HCC): Status: ACTIVE | Noted: 2019-01-01

## 2019-12-26 NOTE — PROGRESS NOTES
PT ARRIVED TO THE  UNIT ORIENTED TO ROOM DISCUSSED PLAN OF CARE FAMILY AT BEDSIDE UPDATED DR OF PATIENT ARRIVAL TO ROOM  CALL LIGHT WITHIN REACH NURSING HISTORY AND ASSESSMENT DONE CONDITION STABLE

## 2019-12-26 NOTE — ED PROVIDER NOTES
ED Provider Note    CHIEF COMPLAINT  Lip swelling  Mouth swelling    HPI  Shawanda Daley Jr. is a 84 y.o. male who presents with a chief complaint of facial swelling lip swelling and diffuse itching.  The itching started on his entire body around 3:00 in the morning is not better after he changed his clothes he not take any other medication to help it such as Benadryl or steroids.  Nothing makes it better he states that the mouth swelling lip swelling has improved.  No difficulty breathing at this time no trouble swallowing    Patient states that he was diagnosed with H. pylori he was started on antibiotics a month wife gives me a bag of amoxicillin Pepto-Bismol, amoxicillin.  There is also some Zofran take as needed he also has medication for BPH which is prior.  These comparing the illness of all appear to be started on 19 December for H. pylori.  He states that he tried taking medication H. pylori before was vomiting it.  Therefore they changed the medication.  I did confirm this on December 19 visit.  Interesting clarithromycin is not listed in his bag but is mentioned in the chart.    REVIEW OF SYSTEMS  General: No fever or chills.  Eyes: No eye discharge. No eye pain.  Ear nose throat: No sore throat or  trouble swallowing.  Pulmonary: No shortness of breath or cough.  Cardiovascular: No chest pain or chest pressure.  GI: No abdominal pain nausea or vomiting.  : No dysuria or hematuria  Dermatologic: See above    All other systems are negative      PAST MEDICAL HISTORY  Past Medical History:   Diagnosis Date   • Aortic stenosis     mild per cardiology note. Followed by [facility] cardiology.   • Bowel habit changes 10/21/2019    constipation & diarrhea   • BPH (benign prostatic hyperplasia) 3/4/2016   • Breath shortness     10/21/19-started 2yrs ago but has worsened in past 4 weeks. Increases with exertion.    • Cataract     fabiola lens surgery   • Chronic fatigue    • Edema of both feet 10/21/2019    and legs with  "redness. Denies open sores   • Hernia of abdominal wall     10/21/19-above umbilicus and pt states above previous hernia mesh & was told fatty and to push back in prn.    • History of anemia    • Indigestion     no meds   • Loss of appetite    • Low serum vitamin B12 3/15/2019   • Pulmonary hypertension (HCC)     followed by [facility] cardiology   • Renal disorder 2015    kidney stone.    • Renal disorder 10/21/2019    \"weak kidney\".  Elevated creatinine on labs   • Stomach pain 10/21/2019   • Urinary incontinence        FAMILY HISTORY  Family History   Problem Relation Age of Onset   • Other Mother         old age   • Heart Disease Father    • Stroke Father 86         from stroke   • Colon Cancer Father         colon cancer   • Cancer Maternal Uncle         prostate   • Alzheimer's Disease Sister    • Cancer Brother         LUNG CANCER   • Other Brother         on a blood thinner medication       SOCIAL HISTORY  Social History     Socioeconomic History   • Marital status:      Spouse name: Not on file   • Number of children: Not on file   • Years of education: Not on file   • Highest education level: Not on file   Occupational History   • Not on file   Social Needs   • Financial resource strain: Not on file   • Food insecurity:     Worry: Not on file     Inability: Not on file   • Transportation needs:     Medical: Not on file     Non-medical: Not on file   Tobacco Use   • Smoking status: Never Smoker   • Smokeless tobacco: Never Used   Substance and Sexual Activity   • Alcohol use: No     Alcohol/week: 0.0 oz     Binge frequency: Never   • Drug use: No   • Sexual activity: Never     Partners: Female   Lifestyle   • Physical activity:     Days per week: Not on file     Minutes per session: Not on file   • Stress: Not on file   Relationships   • Social connections:     Talks on phone: Not on file     Gets together: Not on file     Attends Sikh service: Not on file     Active member of club or " organization: Not on file     Attends meetings of clubs or organizations: Not on file     Relationship status: Not on file   • Intimate partner violence:     Fear of current or ex partner: Not on file     Emotionally abused: Not on file     Physically abused: Not on file     Forced sexual activity: Not on file   Other Topics Concern   • Not on file   Social History Narrative    Retired .  Lives with his wife.  He is using a walker but not having any falls or head trauma.  No memory concerns.  He is having some hearing concerns.  He was hospitalized in 2019.       SURGICAL HISTORY  Past Surgical History:   Procedure Laterality Date   • GASTROSCOPY-ENDO  10/24/2019    Procedure: GASTROSCOPY- POSSIBLE BIOPSY, DILATION, POLYPECTOMY, CONTROL OF HEMORRHAGE;  Surgeon: Ephraim Medrano M.D.;  Location: [facility];  Service: Gastroenterology   • EGD W/ENDOSCOPIC ULTRASOUND  10/24/2019    Procedure: EGD, WITH ENDOSCOPIC US;  Surgeon: Ephraim Medrano M.D.;  Location: [facility];  Service: Gastroenterology   • EGD WITH ASP/BX  10/24/2019    Procedure: EGD, WITH ASPIRATION BIOPSY- FNA;  Surgeon: Ephraim Medrano M.D.;  Location: [facility];  Service: Gastroenterology   • ENDOSCOPY-ESOPH/STOMACH  10/02/2019   • COLONOSCOPY  2018   • BREAST BIOPSY Left 11/18/2016    Procedure: BREAST BIOPSY;  Surgeon: Adrianne Dobbins M.D.;  Location: [facility];  Service:    • INGUINAL HERNIA REPAIR ROBOTIC Bilateral 8/10/2016    Procedure: INGUINAL HERNIA REPAIR ROBOTIC with mesh  ;  Surgeon: Francis Fletcher M.D.;  Location: [facility];  Service:    • COLONOSCOPY  2013   • CYST EXCISION  2000    anal cyst       CURRENT MEDICATIONS  Home Medications     Reviewed by Krista Rojas R.N. (Registered Nurse) on 12/26/19 at 1149  Med List Status: Complete   Medication Last Dose Status   amoxicillin (AMOXIL) 500 MG Cap 12/26/2019 Active   bismuth subsalicylate  "(PEPTO-BISMOL) 262 MG Chew Tab 12/26/2019 Active   clarithromycin (BIAXIN) 500 MG Tab not taking Active   gabapentin (NEURONTIN) 100 MG Cap not taking Active   omeprazole (PRILOSEC) 20 MG delayed-release capsule 12/26/2019 Active   ondansetron (ZOFRAN ODT) 4 MG TABLET DISPERSIBLE 12/26/2019 Active   tamsulosin (FLOMAX) 0.4 MG capsule 12/26/2019 Active                ALLERGIES  No Known Allergies    PHYSICAL EXAM  VITAL SIGNS: /77   Pulse (!) 56   Temp 36.2 °C (97.2 °F) (Temporal)   Resp (!) 28   Ht 1.803 m (5' 11\")   Wt 76.4 kg (168 lb 6.9 oz)   SpO2 100%   BMI 23.49 kg/m²    Constitutional: Well developed, Well nourished, no acute distress,   HENT: Slight lip swelling noted.  He has some jowl and cheek swelling of the buccal mucosa mucosa seem to be swollen underneath the submandibular also slightly swollen tongue is slightly swollen but he is able to move it I am able to see the uvula.  There is no muffled voice.  No stridor noted  Eyes: PERRLA, EOMI, Conjunctiva normal, No discharge.   Musculoskeletal: Neck normal range of motion, No tenderness, Supple,  Cardiovascular: Regular rhythm rate of 80 no murmurs.  Thorax & Lungs: No stridor no respiratory distress comfortable full sentences  Abdomen: Bowel sounds normal, Soft, No tenderness, No masses, No pulsatile masses.   Skin: Diffuse redness noted no urticaria appreciated  : No CVA tenderness.   Neurologic: Alert & oriented , moves all extremities equally  Psychiatric:  Calm, not anxious      RADIOLOGY/PROCEDURES  IV was established.  The patient received IV Benadryl and IV Decadron.    COURSE & MEDICAL DECISION MAKING  Pertinent Labs & Imaging studies reviewed. (See chart for details)  Allergic reaction most likely to the medications cannot be 100% certain the best to stop taking his medications will get some Benadryl and steroids they live about 45 miles away they are elderly at this point because of the air airway possibility and severity of this " large rash we will go ahead and contentedly admit the patient to see significant visit response to medication.    Patient was reevaluate his eyes lips actually is much better he is actually feeling better is drinking fluids without difficulty.  This patient with what appears to be allergic reaction airways intact but because he lives a significant distance awaiting patient be admitted for observation this will help patient and the wife I think in success.  I spoke with the hospitalist who requested that I call CDU for admission for this case.  I then spoke to CDU about this case and gave him the update as above.  Patient admitted for observation.    FINAL IMPRESSION  1.  Allergic reaction  2.  Facial swelling improving  3.      Electronically signed by: Britton Fraga, 12/26/2019 12:55 PM

## 2019-12-26 NOTE — ED TRIAGE NOTES
"Chief Complaint   Patient presents with   • Allergic Reaction       Patient ambulatory to triage with personal walker. Patient states that he woke up this morning with generalized itching, oral swelling in lips, discoloration in hands,  and tightness in throat. Report he is having trouble swallowing. Face is visually swollen.  States that he started to take to new medications on the 19th and has not had issues until this morning. Reports that oral intake has been poor since the 19th. Speaking in complete sentences and no sign of respiratory distress.     Blood Pressure : 105/59, Pulse: (!) 54, Respiration: 20, Temperature: 36.2 °C (97.2 °F), Height: 180.3 cm (5' 11\"), Weight: 76.4 kg (168 lb 6.9 oz), Pulse Oximetry: 99 %, O2 Delivery: None (Room Air)        "

## 2019-12-26 NOTE — ED NOTES
Pt and family updated with plan of care.  Pt legs elevated for added comfort.  Pt tolerating ice chips without distress.

## 2019-12-26 NOTE — ED NOTES
Medication reconciliation has been completed by interviewing patient with rx bottles and consent to do so with family/visitors in the room.  Reviewed rx bottles and returned to pt family at bedside  Allergies were reviewed  Pt home pharmacy:[facility]    Pt reports he has been taking AMOXIL that was prescribed on 10-31-19

## 2019-12-26 NOTE — ED NOTES
Pt connected to bedside monitor, o2 at 2 liters applied via nc.  Call light in place.  Pt in no distress, vss.

## 2019-12-27 PROBLEM — E87.5 HYPERKALEMIA: Status: ACTIVE | Noted: 2019-01-01

## 2019-12-27 PROBLEM — E87.20 METABOLIC ACIDOSIS: Status: ACTIVE | Noted: 2019-01-01

## 2019-12-27 NOTE — PROGRESS NOTES
Up to bathroom with one person sba. Unsteady. Wheezing and SOB when oob. Able to recover after about 2 mins of slow deep breaths. Incontinent urine and stool.

## 2019-12-27 NOTE — ASSESSMENT & PLAN NOTE
He found to have bacterial infection and he has been taking treatment and he developed allergic reaction.  Holding medication.  He needs further outpatient follow-up.

## 2019-12-27 NOTE — ASSESSMENT & PLAN NOTE
He found to have allergic reaction with swelling and rash over his body.  ? Secondary to medication.  Recently started on clarithromycin.   He is protecting his airway.   Rash has resolved.   Continue IV steroids and benadryl

## 2019-12-27 NOTE — PROGRESS NOTES
2 RN skin check complete with ORLANDO Zaidi.    Redness noted to scrotum and groin due to incontinence.  Dry pink scratches and scab noted to bilat thigh.  Slight swelling to bilat LE.    Pressure ulcers found on mid- buttocks.   Wound consult placed 12-27     The following interventions in place: skin cleanse with foam cleanser, hydrocolloid dressing placed, wound consult ordered, pictures taken, encouraged to turn to sides, and repositioned with pillows.

## 2019-12-27 NOTE — PROGRESS NOTES
"Pt really afraid to take omeprazole because \"don't you think it is the medicine that i'm allergic too?\". Since we are not sure what the med that caused his allergy patient wants to speak to the physician first before taking pill.   "

## 2019-12-27 NOTE — PROGRESS NOTES
"When I asked the patient on what medicine he is allergic to so I can update his profile, he can't really tell which one of his meds but he relays it more to Amoxicillin and Prilozec as his \"stomach is hurting after I take them\".   "

## 2019-12-27 NOTE — PROGRESS NOTES
Hospital Medicine Daily Progress Note    Date of Service  12/27/2019    Chief Complaint  84 y.o. male admitted 12/26/2019 with body rash and and lip swelling.    Hospital Course    This is an 84-year-old male with a past medical history of chronic kidney disease and BPH admitted with new onset generalized rash and swelling of his lower lip.  His symptoms are suspected to be secondary to an allergic reaction as he has recently been initiated on new medications and treatment of H. pylori.  His home medications have been held.  He was initiated on IV steroids and Benadryl.  He has been protecting his airway and is stable on supplemental oxygen.  He is noted to have metabolic acidosis improving on IV hydration.  Patient was also noted to have an acute on chronic kidney injury improving with IV hydration.      Interval Problem Update  12/27: Metabolic acidosis overall improved.  Generalized rash resolved.  He continues to have shortness of breath and has significant wheezing.  He denies a smoking history.  He will be continued on RT protocol and will start on trial of Symbicort.  We will continue IV steroids.  Kidney function is slowly improving with IV hydration.    Consultants/Specialty  N/A    Code Status  FULL    Disposition  Anticipate home at discharge when medically clear.    Review of Systems  Review of Systems   Constitutional: Positive for malaise/fatigue. Negative for chills and fever.   HENT: Negative for congestion and sore throat.    Eyes: Negative for blurred vision and double vision.   Respiratory: Positive for shortness of breath and wheezing. Negative for stridor.    Cardiovascular: Negative for chest pain, palpitations and leg swelling.   Gastrointestinal: Negative for abdominal pain, constipation, diarrhea, nausea and vomiting.   Genitourinary: Negative for dysuria.   Musculoskeletal: Negative for myalgias.   Skin: Negative for itching and rash.   Neurological: Negative for dizziness, tingling,  tremors, sensory change, speech change, focal weakness and headaches.        Physical Exam  Temp:  [36.1 °C (97 °F)-37.2 °C (99 °F)] 36.6 °C (97.8 °F)  Pulse:  [58-76] 58  Resp:  [16-24] 20  BP: (104-156)/(58-92) 143/77  SpO2:  [96 %-100 %] 96 %    Physical Exam  Vitals signs and nursing note reviewed.   Constitutional:       General: He is not in acute distress.     Appearance: Normal appearance. He is not ill-appearing.   HENT:      Head: Normocephalic and atraumatic.      Right Ear: External ear normal.      Left Ear: External ear normal.      Nose: Nose normal. No congestion.      Mouth/Throat:      Mouth: Mucous membranes are moist.      Pharynx: Oropharynx is clear. No oropharyngeal exudate.   Eyes:      General:         Right eye: No discharge.         Left eye: No discharge.      Conjunctiva/sclera: Conjunctivae normal.      Pupils: Pupils are equal, round, and reactive to light.   Neck:      Musculoskeletal: Normal range of motion.   Cardiovascular:      Rate and Rhythm: Normal rate and regular rhythm.      Pulses: Normal pulses.      Heart sounds: Normal heart sounds. No murmur.   Pulmonary:      Effort: Pulmonary effort is normal. No respiratory distress.      Breath sounds: No stridor. Wheezing present. No rhonchi or rales.   Abdominal:      General: Abdomen is flat. Bowel sounds are normal. There is no distension.      Tenderness: There is no tenderness.   Musculoskeletal: Normal range of motion.   Skin:     General: Skin is warm and dry.      Capillary Refill: Capillary refill takes less than 2 seconds.      Coloration: Skin is not jaundiced.   Neurological:      General: No focal deficit present.      Mental Status: He is alert and oriented to person, place, and time. Mental status is at baseline.   Psychiatric:         Mood and Affect: Mood normal.         Behavior: Behavior normal.         Thought Content: Thought content normal.         Judgment: Judgment normal.         Fluids    Intake/Output  Summary (Last 24 hours) at 12/27/2019 1400  Last data filed at 12/26/2019 2000  Gross per 24 hour   Intake 200 ml   Output --   Net 200 ml       Laboratory  Recent Labs     12/26/19  1227 12/27/19  0421   WBC 5.8 4.4*   RBC 4.17* 3.85*   HEMOGLOBIN 11.9* 11.1*   HEMATOCRIT 38.2* 36.0*   MCV 91.6 93.5   MCH 28.5 28.8   MCHC 31.2* 30.8*   RDW 48.3 49.1   PLATELETCT 162* 158*   MPV 10.2 10.1     Recent Labs     12/26/19  1227 12/27/19  0421 12/27/19  1307   SODIUM 136 135 134*   POTASSIUM 5.0 5.3 5.3   CHLORIDE 113* 114* 111   CO2 13* 10* 12*   GLUCOSE 106* 148* 142*   BUN 62* 64* 65*   CREATININE 3.38* 3.26* 3.25*   CALCIUM 8.0* 7.6* 7.9*                   Imaging  DX-CHEST-LIMITED (1 VIEW)   Final Result         Low lung volume with hypoventilatory change.      Patchy bibasilar opacities, right more than left, atelectasis or consolidation.           Assessment/Plan  Allergic reaction  Assessment & Plan  He found to have allergic reaction with swelling and rash over his body.  ? Secondary to medication.  Recently started on clarithromycin.   He is protecting his airway.   Rash has resolved.   Continue IV steroids and benadryl      Hyperkalemia  Assessment & Plan  Suspect secondary to SHIRA  Continue IV hydration  Follow bmp  Monitor on cardiac monitor.     Metabolic acidosis  Assessment & Plan  Suspect secondary to allergic reaction  Improving with IV hydration  Follow bmp.     Wheezing  Assessment & Plan  Patient found to have wheezing on physical exam.  Denies smoking history   Continue RT protocol   Trial symbicort.   Continue IV steroids  Outpatient PFTs.     Acute kidney injury superimposed on chronic kidney disease (HCC)  Assessment & Plan  He found to have elevated BUN/creatinine which is above his baseline.  Continue IV hydration  Avoid nephrotoxins.  Medication dose adjustment as per renal functions per  Follow bmp.     H. pylori infection- (present on admission)  Assessment & Plan  He found to have bacterial  infection and he has been taking treatment and he developed allergic reaction.  Holding medication.  He needs further outpatient follow-up.    BPH (benign prostatic hyperplasia)- (present on admission)  Assessment & Plan  Continue tamsulosin.         VTE prophylaxis: SCDs

## 2019-12-27 NOTE — ASSESSMENT & PLAN NOTE
He found to have elevated BUN/creatinine which is above his baseline.  Continue IV hydration  Avoid nephrotoxins.  Medication dose adjustment as per renal functions per  Follow bmp.

## 2019-12-27 NOTE — PROGRESS NOTES
Pt a&o x4, anxious at times. On 1L o2 nc. SOB noted with exertion. Respirations equal and unlabored. Complaints of abdominal pain 3  on NPRS scale. Tylenol given as ordered.  Ambulatory with walker and FWW per report. High risk fall due to generalized weakness and bilat LE neuropathy. SR with first degree block 70's on the monitor.  Repositions self in bed.  Good po intake without any s/sx of aspiration noted. ALEX.  Plan of care reviewed including: benadryl and IV steroid, weaning oxygen, vitals frequency and fall precautions. Verbalized understanding and agrees. White board updated. Instructed to use call light prior to getting oob to prevent falls. Able to make needs known.  Call light within reach. Bed alarm active.

## 2019-12-27 NOTE — H&P
Hospital Medicine History & Physical Note    Date of Service  12/26/2019    Primary Care Physician  Ubaldo Mayfield M.D.    Consultants  None    Code Status    Full code    Chief Complaint    Allergic reaction this morning      History of Presenting Illness  84 y.o. male with possible history of chronic kidney disease and BPH who presented to the hospital on 12/26/2019 with complaint of allergic reaction that started this morning.  Patient reported that he started taking medication for H. pylori on December 19, 2019 and this morning he woke up around 3 AM he noticed generalized rash on his body and later this morning when he woke up and he was about to take his medication he noticed significant swelling on his left lower lip and he also had episode of vomiting.  He reported that he tried to take his as per medication approximately 1 month ago and he started having vomiting and he stopped taking that medication.  He denies having prior similar episode and he reported he does not have any known drug allergies.  He expressed that he also noticed mild tongue swelling and since morning his symptoms has significantly improved and his rash subsided.  At the time of evaluation he is protecting his airway.    I discussed about this admission with ED physician Dr. Fraga    Review of Systems  Review of Systems   Constitutional: Negative for chills, fever and weight loss.   HENT: Negative for hearing loss and tinnitus.    Eyes: Negative for blurred vision, double vision, photophobia and pain.   Respiratory: Positive for shortness of breath. Negative for cough and sputum production.    Cardiovascular: Negative for chest pain, palpitations, orthopnea and leg swelling.   Gastrointestinal: Positive for vomiting. Negative for abdominal pain, constipation, diarrhea and nausea.   Genitourinary: Negative for dysuria, frequency and urgency.   Musculoskeletal: Negative for back pain, joint pain, myalgias and neck pain.   Skin:  Positive for itching and rash.   Neurological: Negative for dizziness, tingling, tremors, sensory change, speech change, focal weakness and headaches.   Psychiatric/Behavioral: Negative for hallucinations and substance abuse.   All other systems reviewed and are negative.      Past Medical History   has a past medical history of Aortic stenosis, Bowel habit changes (10/21/2019), BPH (benign prostatic hyperplasia) (3/4/2016), Breath shortness, Cataract, Chronic fatigue, Edema of both feet (10/21/2019), Hernia of abdominal wall, History of anemia, Indigestion, Loss of appetite, Low serum vitamin B12 (3/15/2019), Pulmonary hypertension (HCC), Renal disorder (2015), Renal disorder (10/21/2019), Stomach pain (10/21/2019), and Urinary incontinence. He also has no past medical history of Arrhythmia, Asthma, Blood transfusion without reported diagnosis, Cancer (HCC), CHF (congestive heart failure) (HCC), Clotting disorder (HCC), COPD (chronic obstructive pulmonary disease) (HCC), Diabetes (HCC), GERD (gastroesophageal reflux disease), Heart attack (HCC), Hypertension, IBD (inflammatory bowel disease), Migraine, Seizure (HCC), Stroke (HCC), Substance abuse (HCC), or Thyroid disease.    Surgical History   has a past surgical history that includes inguinal hernia repair robotic (Bilateral, 8/10/2016); breast biopsy (Left, 11/18/2016); cyst excision (2000); colonoscopy (2013); colonoscopy (2018); endoscopy-esoph/stomach (10/02/2019); gastroscopy-endo (10/24/2019); egd w/endoscopic ultrasound (10/24/2019); and egd with asp/bx (10/24/2019).     Family History  family history includes Alzheimer's Disease in his sister; Cancer in his brother and maternal uncle; Colon Cancer in his father; Heart Disease in his father; Other in his brother and mother; Stroke (age of onset: 86) in his father.     Social History   reports that he has never smoked. He has never used smokeless tobacco. He reports that he does not drink alcohol or use  drugs.    Allergies  No Known Allergies    Medications  Prior to Admission Medications   Prescriptions Last Dose Informant Patient Reported? Taking?   Cyanocobalamin (VITAMIN B-12) 2500 MCG SL Tab 12/26/2019 at 0700 Rx Bottle (For Med Information) Yes Yes   Sig: Place 2,500 mcg under tongue every morning.   amoxicillin (AMOXIL) 500 MG Cap 12/26/2019 at 0700 Rx Bottle (For Med Information) Yes No   Sig: Take 1,000 mg by mouth 2 Times a Day.   bismuth subsalicylate (PEPTO-BISMOL) 262 MG Chew Tab 12/26/2019 at 0700 Rx Bottle (For Med Information) No No   Sig: Take 2 Tabs by mouth 4 Times a Day,Before Meals and at Bedtime for 14 days.   diphenhydrAMINE-APAP, sleep, (TYLENOL PM EXTRA STRENGTH)  MG Tab <1week at prn Patient Yes Yes   Sig: Take 2 Tabs by mouth at bedtime as needed (sleep).   gabapentin (NEURONTIN) 100 MG Cap 12/16/2019 at stopped Rx Bottle (For Med Information) No No   Sig: Take 1 Cap by mouth every evening.   omeprazole (PRILOSEC) 20 MG delayed-release capsule 12/26/2019 at 0700 Rx Bottle (For Med Information) No No   Sig: Take 1 Cap by mouth 2 times a day for 42 days.   ondansetron (ZOFRAN ODT) 4 MG TABLET DISPERSIBLE 12/25/2019 at pm Rx Bottle (For Med Information) No No   Sig: Take 1 Tab by mouth every 6 hours as needed for Nausea.   tamsulosin (FLOMAX) 0.4 MG capsule 12/26/2019 at 0700 Rx Bottle (For Med Information) Yes No   Sig: tamsulosin 0.4 mg capsule   Take 1 capsule every day by oral route.      Facility-Administered Medications: None       Physical Exam  Temp:  [36.1 °C (97 °F)-36.4 °C (97.6 °F)] 36.1 °C (97 °F)  Pulse:  [54-76] 73  Resp:  [20-28] 20  BP: (105-156)/(59-83) 129/78  SpO2:  [97 %-100 %] 98 %    Physical Exam  Vitals signs reviewed.   Constitutional:       General: He is not in acute distress.  HENT:      Head: Normocephalic and atraumatic. No contusion.      Right Ear: External ear normal.      Left Ear: External ear normal.      Nose: Nose normal.      Mouth/Throat:       Mouth: Mucous membranes are moist.      Pharynx: No oropharyngeal exudate.      Comments: Lips swelling.  Eyes:      General:         Right eye: No discharge.         Left eye: No discharge.      Pupils: Pupils are equal, round, and reactive to light.   Neck:      Musculoskeletal: No neck rigidity or muscular tenderness.   Cardiovascular:      Rate and Rhythm: Normal rate and regular rhythm.      Heart sounds: No murmur. No friction rub. No gallop.    Pulmonary:      Effort: Pulmonary effort is normal.      Breath sounds: Wheezing (Diffuse bilateral) present. No rhonchi.      Comments: He is protecting his airway.  Abdominal:      General: Bowel sounds are normal. There is no distension.      Palpations: Abdomen is soft.      Tenderness: There is no tenderness. There is no rebound.   Musculoskeletal: Normal range of motion.         General: No swelling or tenderness.   Skin:     General: Skin is warm and dry.      Coloration: Skin is not jaundiced.   Neurological:      General: No focal deficit present.      Mental Status: He is alert.      Cranial Nerves: No cranial nerve deficit.      Sensory: No sensory deficit.   Psychiatric:         Mood and Affect: Mood normal.         Laboratory:  Recent Labs     12/26/19  1227   WBC 5.8   RBC 4.17*   HEMOGLOBIN 11.9*   HEMATOCRIT 38.2*   MCV 91.6   MCH 28.5   MCHC 31.2*   RDW 48.3   PLATELETCT 162*   MPV 10.2     Recent Labs     12/26/19  1227   SODIUM 136   POTASSIUM 5.0   CHLORIDE 113*   CO2 13*   GLUCOSE 106*   BUN 62*   CREATININE 3.38*   CALCIUM 8.0*     Recent Labs     12/26/19  1227   ALTSGPT <5   ASTSGOT 11*   ALKPHOSPHAT 48   TBILIRUBIN 0.3   GLUCOSE 106*         No results for input(s): NTPROBNP in the last 72 hours.      No results for input(s): TROPONINT in the last 72 hours.    Urinalysis:    No results found     Imaging:  DX-CHEST-LIMITED (1 VIEW)    (Results Pending)         Assessment/Plan:  I anticipate this patient is appropriate for observation status at  this time.    Allergic reaction  Assessment & Plan  He found to have allergic reaction with swelling and rash over his body.  At the time of evaluation his symptom has significantly improved  He is protecting his airway.  I started him on methylprednisolone IV 40 mg daily.  Started him on Benadryl.  Admit to CDU on telemetry for close monitoring of his respiratory status.    Wheezing  Assessment & Plan  Patient found to have wheezing on physical exam.  I ordered chest x-ray.  Started him on respiratory therapy per protocol.  Admit to CDU for monitoring.    Acute kidney injury superimposed on chronic kidney disease (HCC)  Assessment & Plan  He found to have elevated BUN/creatinine which is above his baseline.  Avoid nephrotoxins.  Medication dose adjustment as per renal functions per  Order lab for tomorrow morning.  Started him on gentle IV fluid.    H. pylori infection- (present on admission)  Assessment & Plan  He found to have bacterial infection and he has been taking treatment and he developed allergic reaction.  Held as per medication.  He needs further outpatient follow-up.    BPH (benign prostatic hyperplasia)- (present on admission)  Assessment & Plan  Continue tamsulosin.      VTE prophylaxis: SCDs

## 2019-12-28 PROBLEM — E87.5 HYPERKALEMIA: Status: RESOLVED | Noted: 2019-01-01 | Resolved: 2019-01-01

## 2019-12-28 NOTE — CARE PLAN
Problem: Safety  Goal: Free from accidental injury  Outcome: PROGRESSING AS EXPECTED     Problem: Knowledge Deficit  Goal: Patient/Significant other demonstrates understanding of disease process, treatment plan, medications and discharge instructions  Outcome: PROGRESSING AS EXPECTED     Problem: Pain  Goal: Alleviation of Pain or a reduction in pain to the patient's comfort goal  Outcome: PROGRESSING AS EXPECTED     Problem: Safety  Goal: Will remain free from falls  Outcome: PROGRESSING AS EXPECTED     Problem: Infection  Goal: Will remain free from infection  Outcome: PROGRESSING AS EXPECTED

## 2019-12-28 NOTE — DISCHARGE INSTRUCTIONS
Discharge Instructions    Discharged to home by car with relative. Discharged via wheelchair, hospital escort: Yes.  Special equipment needed: Not Applicable    Be sure to schedule a follow-up appointment with your primary care doctor or any specialists as instructed.     Discharge Plan:   Diet Plan: Discussed  Activity Level: Discussed  Confirmed Follow up Appointment: Appointment Scheduled  Confirmed Symptoms Management: Discussed  Medication Reconciliation Updated: Yes  Influenza Vaccine Indication: Not indicated: Previously immunized this influenza season and > 8 years of age    I understand that a diet low in cholesterol, fat, and sodium is recommended for good health. Unless I have been given specific instructions below for another diet, I accept this instruction as my diet prescription.   Other diet: Heart healthy    Special Instructions: None    · Is patient discharged on Warfarin / Coumadin?   No     Depression / Suicide Risk    As you are discharged from this [facility] facility, it is important to learn how to keep safe from harming yourself.    Recognize the warning signs:  · Abrupt changes in personality, positive or negative- including increase in energy   · Giving away possessions  · Change in eating patterns- significant weight changes-  positive or negative  · Change in sleeping patterns- unable to sleep or sleeping all the time   · Unwillingness or inability to communicate  · Depression  · Unusual sadness, discouragement and loneliness  · Talk of wanting to die  · Neglect of personal appearance   · Rebelliousness- reckless behavior  · Withdrawal from people/activities they love  · Confusion- inability to concentrate     If you or a loved one observes any of these behaviors or has concerns about self-harm, here's what you can do:  · Talk about it- your feelings and reasons for harming yourself  · Remove any means that you might use to hurt yourself (examples: pills, rope, extension cords,  firearm)  · Get professional help from the community (Mental Health, Substance Abuse, psychological counseling)  · Do not be alone:Call your Safe Contact- someone whom you trust who will be there for you.  · Call your local CRISIS HOTLINE 062-1783 or 006-507-6327  · Call your local Children's Mobile Crisis Response Team [place] (442) 730-5424 or www.[employer]  · Call the toll free National Suicide Prevention Hotlines   · National Suicide Prevention Lifeline 890-217-KKPJ (7532)  · National Hope Line Network 800-SUICIDE (642-4413)

## 2019-12-28 NOTE — DISCHARGE SUMMARY
Discharge Summary    CHIEF COMPLAINT ON ADMISSION  Chief Complaint   Patient presents with   • Allergic Reaction       Reason for Admission  Oral Swelling     Admission Date  12/26/2019    CODE STATUS  Full Code    HPI & HOSPITAL COURSE  This is an 84-year-old male with a past medical history of chronic kidney disease and BPH admitted with new onset generalized rash and swelling of his lower lip.  His symptoms are suspected to be secondary to an allergic reaction as he has recently been initiated on new medications in treatment of H. pylori.  His home medications have been held.  He was initiated on IV steroids and Benadryl.  He has been protecting his airway and is stable on supplemental oxygen.  The patient was noted to have wheezing throughout lung fields, although unsure if this is secondary to his allergic reaction or is a chronic issue.  He was continued on aggressive RT protocol over his hospital stay.  He was noted to have metabolic acidosis improved with IV hydration.  Patient was also noted to have an acute on chronic kidney injury improving with IV hydration.     Over his hospital stay he had complete resolution of his body rash and lip swelling.  He does continue to have wheezing in his lung fields with concern for underlying chronic process.  He does deny history of smoking he has had improvement of his respiratory status with addition of Symbicort and he will be continued on this inhaler.  He will also be prescribed albuterol inhalers for shortness of breath.  He will need outpatient pulmonary follow-up for PFTs and further recommendations.    The patient is recommended to hold all medications recently started by his outpatient gastroenterologist, especially amoxicillin, as we are unsure which medication caused his reaction.  He is recommended to follow-up with his gastroenterologist next week for further evaluation and recommendations concerning treatment of his H. pylori.  The patient currently  denies any abdominal symptoms including nausea, vomiting, or diarrhea.  He has tolerating an oral diet.    He has also recommended to have a BMP in 1 week to ensure further improvement of his renal function and metabolic acidosis.    At this time the patient is medically stable and then has no further need for acute intervention.  He is safe for discharge home with close outpatient follow-up.        Therefore, he is discharged in good and stable condition to home with close outpatient follow-up.    Discharge Date  12/28/2019    FOLLOW UP ITEMS POST DISCHARGE  -Follow with gastroenterology in the outpatient setting.  -Recommend outpatient pulmonary evaluation.    DISCHARGE DIAGNOSES  Active Problems:    Allergic reaction POA: Unknown    BPH (benign prostatic hyperplasia) POA: Yes    H. pylori infection POA: Yes    Acute kidney injury superimposed on chronic kidney disease (HCC) POA: Unknown    Wheezing POA: Unknown    Metabolic acidosis POA: Unknown    Hyperkalemia POA: Unknown  Resolved Problems:    * No resolved hospital problems. *      FOLLOW UP  Future Appointments   Date Time Provider Department Center   1/14/2020  9:45 AM LAB [facility] None   1/21/2020  2:30 PM [facility]       MEDICATIONS ON DISCHARGE     Medication List      START taking these medications      Instructions   albuterol 108 (90 Base) MCG/ACT Aers inhalation aerosol   Inhale 2 Puffs by mouth every four hours as needed.  Dose:  2 Puff     budesonide-formoterol 80-4.5 MCG/ACT Aero  Commonly known as:  SYMBICORT   Inhale 2 Puffs by mouth 2 Times a Day.  Dose:  2 Puff     predniSONE 20 MG Tabs  Commonly known as:  DELTASONE   Take 2 Tabs by mouth every day for 3 days.  Dose:  40 mg        CONTINUE taking these medications      Instructions   gabapentin 100 MG Caps  Commonly known as:  NEURONTIN   Take 1 Cap by mouth every evening.  Dose:  100 mg     omeprazole 20 MG delayed-release capsule  Commonly known as:  PRILOSEC    Take 1 Cap by mouth 2 times a day for 42 days.  Dose:  20 mg     ondansetron 4 MG Tbdp  Commonly known as:  ZOFRAN ODT   Take 1 Tab by mouth every 6 hours as needed for Nausea.  Dose:  4 mg     tamsulosin 0.4 MG capsule  Commonly known as:  FLOMAX   tamsulosin 0.4 mg capsule   Take 1 capsule every day by oral route.     TYLENOL PM EXTRA STRENGTH  MG Tabs  Generic drug:  diphenhydrAMINE-APAP (sleep)   Take 2 Tabs by mouth at bedtime as needed (sleep).  Dose:  2 Tab     Vitamin B-12 2500 MCG Subl   Place 2,500 mcg under tongue every morning.  Dose:  2,500 mcg        STOP taking these medications    amoxicillin 500 MG Caps  Commonly known as:  AMOXIL     bismuth subsalicylate 262 MG Chew  Commonly known as:  PEPTO-BISMOL            Allergies  No Known Allergies    DIET  Orders Placed This Encounter   Procedures   • Diet Order Regular     Standing Status:   Standing     Number of Occurrences:   1     Order Specific Question:   Diet:     Answer:   Regular [1]     Order Specific Question:   Electrolyte modifications:     Answer:   Low Potassium [3]       ACTIVITY  As tolerated.  Weight bearing as tolerated    LABORATORY  Lab Results   Component Value Date    SODIUM 136 12/28/2019    POTASSIUM 4.9 12/28/2019    CHLORIDE 112 12/28/2019    CO2 13 (L) 12/28/2019    GLUCOSE 81 12/28/2019    BUN 69 (H) 12/28/2019    CREATININE 3.20 (H) 12/28/2019        Lab Results   Component Value Date    WBC 4.4 (L) 12/27/2019    HEMOGLOBIN 11.1 (L) 12/27/2019    HEMATOCRIT 36.0 (L) 12/27/2019    PLATELETCT 158 (L) 12/27/2019

## 2019-12-28 NOTE — PROGRESS NOTES
Assumed pt care at 0700. Received report from Dyan PERRY. A&O x4. Pt denies pain at this time. Swelling and rash appear to have resolved, but pt does endorse some mild itching to BL hands. Respirations even and unlabored on RA. On tele monitor, SR noted.   Updated on POC, communication board updated. Bed locked and in lowest position. Call light and belongings within reach. Non-skid socks in place. Needs met, will continue to monitor.

## 2019-12-28 NOTE — PROGRESS NOTES
Received in bed, aox4, sr with 1st degree av block on monitor. Assessment as per CDU. Call light within reach. Needs attended. Plan of care discussed and understood.

## 2019-12-28 NOTE — PROGRESS NOTES
Pt DC'd. IV removed, discharge instructions provided to patient, pt verbalizes understanding. Pt states all questions have been answered. Copy of discharge paperwork provided to pt, signed copy in chart. Pt states all belongings in possession. Pt awaiting transportation home.

## 2019-12-28 NOTE — CARE PLAN
Problem: Safety  Goal: Will remain free from falls  Outcome: PROGRESSING AS EXPECTED  Intervention: Implement fall precautions  Note:   Fall precautions in place.     Problem: Infection  Goal: Will remain free from infection  Outcome: PROGRESSING AS EXPECTED  Intervention: Implement standard precautions and perform hand washing before and after patient contact  Note:   Practice aseptic techniques.

## 2019-12-28 NOTE — PROGRESS NOTES
Pt. Confused, got up, pulled iv lines, blood  And poop everywhere. Attended to needs. Assisted by Ammon

## 2019-12-31 NOTE — PROGRESS NOTES
Chief Complaint   Patient presents with   • Oral Swelling     hosp fv    • Allergic Reaction     hosp fv        HISTORY OF PRESENT ILLNESS: Patient is a 84 y.o. male established patient here today for the following concerns:    This is a 84-year-old gentleman here today for hospital follow-up.  He is a patient of Dr. Mayfield.  He was recently hospitalized with possible allergic reaction with rash lip and tongue swelling and some shortness of breath after he started treatment for H. pylori.  His treatment was immediately stopped and he was put on high-dose steroids and Benadryl.  His reaction seem to resolve.  He still reports some difficulty with shortness of breath, although this is back to baseline prior to hospitalization.  He still has some abdominal pain which is been worked up with his GI doctor in the past.  He was advised to follow-up with GI regarding treatment options for his H. pylori given the allergic reaction.  He is here today with worsening jaundice, some confusion abdominal pain and anasarca.  Of note he also had worsening renal function that appeared to start back in August of this year.  In addition his BUN roberto carlos to about 60.  He reports that he has been having melanic stools but is unsure if this was occurring prior to starting the Pepto-Bismol for H pylori.  He reports that his stools have been loose and dark.  He denies any fevers or chills.  No vomiting.        Past Medical, Social, and Family history reviewed and updated in EPIC    Allergies:Patient has no known allergies.    Current Outpatient Medications   Medication Sig Dispense Refill   • budesonide-formoterol (SYMBICORT) 80-4.5 MCG/ACT Aerosol Inhale 2 Puffs by mouth 2 Times a Day. 1 Inhaler 0   • albuterol 108 (90 Base) MCG/ACT Aero Soln inhalation aerosol Inhale 2 Puffs by mouth every four hours as needed. 8.5 g 0   • ondansetron (ZOFRAN ODT) 4 MG TABLET DISPERSIBLE Take 1 Tab by mouth every 6 hours as needed for Nausea. 30 Tab 0   •  "predniSONE (DELTASONE) 20 MG Tab Take 2 Tabs by mouth every day for 3 days. (Patient not taking: Reported on 12/31/2019) 6 Tab 0   • Cyanocobalamin (VITAMIN B-12) 2500 MCG SL Tab Place 2,500 mcg under tongue every morning.     • diphenhydrAMINE-APAP, sleep, (TYLENOL PM EXTRA STRENGTH)  MG Tab Take 2 Tabs by mouth at bedtime as needed (sleep).     • omeprazole (PRILOSEC) 20 MG delayed-release capsule Take 1 Cap by mouth 2 times a day for 42 days. (Patient not taking: Reported on 12/31/2019) 84 Cap 0   • gabapentin (NEURONTIN) 100 MG Cap Take 1 Cap by mouth every evening. (Patient not taking: Reported on 12/31/2019) 90 Cap 3   • tamsulosin (FLOMAX) 0.4 MG capsule tamsulosin 0.4 mg capsule   Take 1 capsule every day by oral route.       No current facility-administered medications for this visit.          ROS:  Review of Systems   Constitutional: Negative for fever, chills, weight loss and malaise/fatigue.   HENT: Negative for ear pain, nosebleeds, congestion, sore throat and neck pain.    Eyes: Negative for blurred vision.   Respiratory: Negative for cough, sputum production, shortness of breath and wheezing.    Cardiovascular: Negative for chest pain, palpitations,  and leg swelling.   Gastrointestinal: Negative for heartburn, nausea, vomiting, diarrhea and abdominal pain.   Genitourinary: Negative for dysuria, urgency and frequency.   Musculoskeletal: Negative for myalgias, back pain and joint pain.   Skin: Negative for rash and itching.   Neurological: Negative for dizziness, tingling, tremors, sensory change, focal weakness and headaches.   Endo/Heme/Allergies: Does not bruise/bleed easily.   Psychiatric/Behavioral: Negative for depression, anxiety, suicidal ideas, insomnia and memory loss.      Exam:  /70   Pulse 66   Temp (!) 35.7 °C (96.3 °F)   Resp 16   Ht 1.791 m (5' 10.5\")   Wt 77.1 kg (170 lb)   SpO2 91%     General:  Well nourished, well developed in NAD  Head is grossly normal.  Neck: " Supple without JVD   Pulmonary:  Normal effort.   Cardiovascular: Regular rate  Extremities: no clubbing, cyanosis, or edema.  Psych: affect appropriate  Abdomen: He is got pitting edema to the abdomen, jaundice down to the level of the chest with scleral icterus.  Abdomen is tight, difficult to assess for ascites.    Please note that this dictation was created using voice recognition software. I have made every reasonable attempt to correct obvious errors, but I expect that there are errors of grammar and possibly content that I did not discover before finalizing the note.    Assessment/Plan:  1. Melena  - REFERRAL TO GASTROENTEROLOGY  - CBC WITH DIFFERENTIAL; Future  - Comp Metabolic Panel; Future  - GAMMA GT (GGT); Future  - Prothrombin Time; Future  - APTT; Future  - AMYLASE; Future  - US-ABDOMEN COMPLETE SURVEY; Future  - LIPASE; Future    2. H. pylori infection  - REFERRAL TO GASTROENTEROLOGY  - CBC WITH DIFFERENTIAL; Future  - Comp Metabolic Panel; Future  - GAMMA GT (GGT); Future  - Prothrombin Time; Future  - APTT; Future  - AMYLASE; Future  - US-ABDOMEN COMPLETE SURVEY; Future  - LIPASE; Future    3. Elevated BUN  - CBC WITH DIFFERENTIAL; Future  - Comp Metabolic Panel; Future  - GAMMA GT (GGT); Future  - Prothrombin Time; Future  - APTT; Future  - AMYLASE; Future  - US-ABDOMEN COMPLETE SURVEY; Future  - LIPASE; Future    4. Jaundice  - CBC WITH DIFFERENTIAL; Future  - Comp Metabolic Panel; Future  - GAMMA GT (GGT); Future  - Prothrombin Time; Future  - APTT; Future  - AMYLASE; Future  - US-ABDOMEN COMPLETE SURVEY; Future  - LIPASE; Future    5. Anasarca  - CBC WITH DIFFERENTIAL; Future  - Comp Metabolic Panel; Future  - GAMMA GT (GGT); Future  - Prothrombin Time; Future  - APTT; Future  - AMYLASE; Future  - US-ABDOMEN COMPLETE SURVEY; Future  - LIPASE; Future

## 2020-01-01 ENCOUNTER — APPOINTMENT (OUTPATIENT)
Dept: RADIOLOGY | Facility: MEDICAL CENTER | Age: 85
DRG: 377 | End: 2020-01-01
Attending: HOSPITALIST
Payer: MEDICARE

## 2020-01-01 ENCOUNTER — PATIENT MESSAGE (OUTPATIENT)
Dept: MEDICAL GROUP | Facility: PHYSICIAN GROUP | Age: 85
End: 2020-01-01

## 2020-01-01 ENCOUNTER — TELEPHONE (OUTPATIENT)
Dept: MEDICAL GROUP | Facility: PHYSICIAN GROUP | Age: 85
End: 2020-01-01

## 2020-01-01 ENCOUNTER — APPOINTMENT (OUTPATIENT)
Dept: RADIOLOGY | Facility: MEDICAL CENTER | Age: 85
DRG: 377 | End: 2020-01-01
Attending: SPECIALIST
Payer: MEDICARE

## 2020-01-01 ENCOUNTER — APPOINTMENT (OUTPATIENT)
Dept: CARDIOLOGY | Facility: MEDICAL CENTER | Age: 85
DRG: 377 | End: 2020-01-01
Attending: STUDENT IN AN ORGANIZED HEALTH CARE EDUCATION/TRAINING PROGRAM
Payer: MEDICARE

## 2020-01-01 ENCOUNTER — APPOINTMENT (OUTPATIENT)
Dept: RADIOLOGY | Facility: MEDICAL CENTER | Age: 85
DRG: 377 | End: 2020-01-01
Attending: EMERGENCY MEDICINE
Payer: MEDICARE

## 2020-01-01 ENCOUNTER — APPOINTMENT (OUTPATIENT)
Dept: RADIOLOGY | Facility: MEDICAL CENTER | Age: 85
DRG: 377 | End: 2020-01-01
Attending: STUDENT IN AN ORGANIZED HEALTH CARE EDUCATION/TRAINING PROGRAM
Payer: MEDICARE

## 2020-01-01 ENCOUNTER — APPOINTMENT (OUTPATIENT)
Dept: RADIOLOGY | Facility: MEDICAL CENTER | Age: 85
DRG: 377 | End: 2020-01-01
Attending: INTERNAL MEDICINE
Payer: MEDICARE

## 2020-01-01 ENCOUNTER — HOSPITAL ENCOUNTER (OUTPATIENT)
Facility: MEDICAL CENTER | Age: 85
End: 2020-01-22
Payer: MEDICARE

## 2020-01-01 ENCOUNTER — HOSPITAL ENCOUNTER (INPATIENT)
Facility: MEDICAL CENTER | Age: 85
LOS: 3 days | DRG: 377 | End: 2020-01-26
Attending: EMERGENCY MEDICINE | Admitting: INTERNAL MEDICINE
Payer: MEDICARE

## 2020-01-01 VITALS
HEIGHT: 70 IN | BODY MASS INDEX: 22.9 KG/M2 | OXYGEN SATURATION: 91 % | SYSTOLIC BLOOD PRESSURE: 59 MMHG | HEART RATE: 45 BPM | DIASTOLIC BLOOD PRESSURE: 45 MMHG | WEIGHT: 160 LBS | RESPIRATION RATE: 6 BRPM | TEMPERATURE: 95.8 F

## 2020-01-01 DIAGNOSIS — D61.818 PANCYTOPENIA (HCC): ICD-10-CM

## 2020-01-01 DIAGNOSIS — R06.2 WHEEZING: ICD-10-CM

## 2020-01-01 LAB
ABO + RH BLD: NORMAL
ABO GROUP BLD: NORMAL
ALBUMIN SERPL BCP-MCNC: 2.4 G/DL (ref 3.2–4.9)
ALBUMIN SERPL BCP-MCNC: 2.5 G/DL (ref 3.2–4.9)
ALBUMIN SERPL BCP-MCNC: 2.6 G/DL (ref 3.2–4.9)
ALBUMIN SERPL BCP-MCNC: 2.7 G/DL (ref 3.2–4.9)
ALBUMIN SERPL BCP-MCNC: 3.1 G/DL (ref 3.2–4.9)
ALBUMIN SERPL ELPH-MCNC: 2.72 G/DL (ref 3.75–5.01)
ALBUMIN/GLOB SERPL: 1 G/DL
ALBUMIN/GLOB SERPL: 1 G/DL
ALBUMIN/GLOB SERPL: 1.1 G/DL
ALBUMIN/GLOB SERPL: 1.1 G/DL
ALBUMIN/GLOB SERPL: 1.2 G/DL
ALP SERPL-CCNC: 49 U/L (ref 30–99)
ALP SERPL-CCNC: 49 U/L (ref 30–99)
ALP SERPL-CCNC: 52 U/L (ref 30–99)
ALP SERPL-CCNC: 52 U/L (ref 30–99)
ALP SERPL-CCNC: 53 U/L (ref 30–99)
ALPHA1 GLOB SERPL ELPH-MCNC: 0.31 G/DL (ref 0.19–0.46)
ALPHA2 GLOB SERPL ELPH-MCNC: 0.5 G/DL (ref 0.48–1.05)
ALT SERPL-CCNC: 5 U/L (ref 2–50)
ALT SERPL-CCNC: 5 U/L (ref 2–50)
ALT SERPL-CCNC: <5 U/L (ref 2–50)
AMYLASE SERPL-CCNC: 57 U/L (ref 20–103)
ANION GAP SERPL CALC-SCNC: 10 MMOL/L (ref 0–11.9)
ANION GAP SERPL CALC-SCNC: 11 MMOL/L (ref 0–11.9)
ANION GAP SERPL CALC-SCNC: 11 MMOL/L (ref 0–11.9)
ANION GAP SERPL CALC-SCNC: 12 MMOL/L (ref 0–11.9)
ANION GAP SERPL CALC-SCNC: 12 MMOL/L (ref 0–11.9)
ANION GAP SERPL CALC-SCNC: 7 MMOL/L (ref 0–11.9)
APTT PPP: 47.5 SEC (ref 24.7–36)
AST SERPL-CCNC: 10 U/L (ref 12–45)
AST SERPL-CCNC: 7 U/L (ref 12–45)
AST SERPL-CCNC: 8 U/L (ref 12–45)
B-GLOBULIN SERPL ELPH-MCNC: 0.53 G/DL (ref 0.48–1.1)
BARCODED ABORH UBTYP: 5100
BARCODED ABORH UBTYP: 6200
BARCODED ABORH UBTYP: 9500
BARCODED PRD CODE UBPRD: NORMAL
BARCODED UNIT NUM UBUNT: NORMAL
BASOPHILS # BLD AUTO: 0 % (ref 0–1.8)
BASOPHILS # BLD AUTO: 0.1 % (ref 0–1.8)
BASOPHILS # BLD AUTO: 0.2 % (ref 0–1.8)
BASOPHILS # BLD AUTO: 0.3 % (ref 0–1.8)
BASOPHILS # BLD: 0 K/UL (ref 0–0.12)
BASOPHILS # BLD: 0.01 K/UL (ref 0–0.12)
BILIRUB SERPL-MCNC: 0.5 MG/DL (ref 0.1–1.5)
BILIRUB SERPL-MCNC: 0.8 MG/DL (ref 0.1–1.5)
BILIRUB SERPL-MCNC: 1.2 MG/DL (ref 0.1–1.5)
BLD GP AB SCN SERPL QL: NORMAL
BUN SERPL-MCNC: 65 MG/DL (ref 8–22)
BUN SERPL-MCNC: 79 MG/DL (ref 8–22)
BUN SERPL-MCNC: 79 MG/DL (ref 8–22)
BUN SERPL-MCNC: 82 MG/DL (ref 8–22)
BUN SERPL-MCNC: 84 MG/DL (ref 8–22)
BUN SERPL-MCNC: 84 MG/DL (ref 8–22)
CALCIUM SERPL-MCNC: 7.4 MG/DL (ref 8.5–10.5)
CALCIUM SERPL-MCNC: 7.4 MG/DL (ref 8.5–10.5)
CALCIUM SERPL-MCNC: 7.5 MG/DL (ref 8.5–10.5)
CALCIUM SERPL-MCNC: 7.6 MG/DL (ref 8.5–10.5)
CALCIUM SERPL-MCNC: 7.8 MG/DL (ref 8.5–10.5)
CALCIUM SERPL-MCNC: 7.8 MG/DL (ref 8.5–10.5)
CFT BLD TEG: 8.2 MIN (ref 5–10)
CFT BLD TEG: 8.6 MIN (ref 5–10)
CFT P HPASE BLD TEG: 7.5 MIN (ref 5–10)
CHLORIDE SERPL-SCNC: 110 MMOL/L (ref 96–112)
CHLORIDE SERPL-SCNC: 110 MMOL/L (ref 96–112)
CHLORIDE SERPL-SCNC: 111 MMOL/L (ref 96–112)
CHLORIDE SERPL-SCNC: 111 MMOL/L (ref 96–112)
CHLORIDE SERPL-SCNC: 112 MMOL/L (ref 96–112)
CHLORIDE SERPL-SCNC: 114 MMOL/L (ref 96–112)
CLOT ANGLE BLD TEG: 23.4 DEGREES (ref 53–72)
CLOT ANGLE BLD TEG: 38.3 DEGREES (ref 53–72)
CLOT ANGLE P HPASE BLD TEG: 31.6 DEGREES (ref 53–72)
CLOT INIT P HPASE BLD TEG: 8 MIN (ref 1–3)
CLOT LYSIS 30M P MA LENFR BLD TEG: 0 % (ref 0–8)
CO2 SERPL-SCNC: 14 MMOL/L (ref 20–33)
CO2 SERPL-SCNC: 15 MMOL/L (ref 20–33)
CO2 SERPL-SCNC: 18 MMOL/L (ref 20–33)
COMMENT 1642: NORMAL
COMMENT 1642: NORMAL
COMPONENT CT 8504CT: NORMAL
COMPONENT CT 8504CT: NORMAL
COMPONENT P 8504P: NORMAL
COMPONENT R 8504R: NORMAL
CREAT SERPL-MCNC: 2.84 MG/DL (ref 0.5–1.4)
CREAT SERPL-MCNC: 3.98 MG/DL (ref 0.5–1.4)
CREAT SERPL-MCNC: 4.18 MG/DL (ref 0.5–1.4)
CREAT SERPL-MCNC: 4.21 MG/DL (ref 0.5–1.4)
CREAT SERPL-MCNC: 4.24 MG/DL (ref 0.5–1.4)
CREAT SERPL-MCNC: 4.39 MG/DL (ref 0.5–1.4)
CREAT UR-MCNC: 88.3 MG/DL
CT.EXTRINSIC BLD ROTEM: 12.2 MIN (ref 1–3)
CT.EXTRINSIC BLD ROTEM: 6.5 MIN (ref 1–3)
D DIMER PPP IA.FEU-MCNC: 4.21 UG/ML (FEU) (ref 0–0.5)
DAT C3D-SP REAG RBC QL: NORMAL
DAT IGG-SP REAG RBC QL: NORMAL
EKG IMPRESSION: NORMAL
EKG IMPRESSION: NORMAL
EOSINOPHIL # BLD AUTO: 0 K/UL (ref 0–0.51)
EOSINOPHIL # BLD AUTO: 0.01 K/UL (ref 0–0.51)
EOSINOPHIL # BLD AUTO: 0.01 K/UL (ref 0–0.51)
EOSINOPHIL # BLD AUTO: 0.02 K/UL (ref 0–0.51)
EOSINOPHIL # BLD AUTO: 0.02 K/UL (ref 0–0.51)
EOSINOPHIL # BLD AUTO: 0.03 K/UL (ref 0–0.51)
EOSINOPHIL # BLD AUTO: 0.03 K/UL (ref 0–0.51)
EOSINOPHIL NFR BLD: 0 % (ref 0–6.9)
EOSINOPHIL NFR BLD: 0.1 % (ref 0–6.9)
EOSINOPHIL NFR BLD: 0.2 % (ref 0–6.9)
EOSINOPHIL NFR BLD: 0.2 % (ref 0–6.9)
EOSINOPHIL NFR BLD: 0.6 % (ref 0–6.9)
EOSINOPHIL NFR BLD: 0.8 % (ref 0–6.9)
EOSINOPHIL NFR BLD: 0.8 % (ref 0–6.9)
ERYTHROCYTE [DISTWIDTH] IN BLOOD BY AUTOMATED COUNT: 47.8 FL (ref 35.9–50)
ERYTHROCYTE [DISTWIDTH] IN BLOOD BY AUTOMATED COUNT: 48.6 FL (ref 35.9–50)
ERYTHROCYTE [DISTWIDTH] IN BLOOD BY AUTOMATED COUNT: 48.7 FL (ref 35.9–50)
ERYTHROCYTE [DISTWIDTH] IN BLOOD BY AUTOMATED COUNT: 49.2 FL (ref 35.9–50)
ERYTHROCYTE [DISTWIDTH] IN BLOOD BY AUTOMATED COUNT: 49.6 FL (ref 35.9–50)
ERYTHROCYTE [DISTWIDTH] IN BLOOD BY AUTOMATED COUNT: 49.8 FL (ref 35.9–50)
ERYTHROCYTE [DISTWIDTH] IN BLOOD BY AUTOMATED COUNT: 49.8 FL (ref 35.9–50)
ETHANOL BLD-MCNC: 0 G/DL
ETHANOL BLD-MCNC: 0 G/DL
FERRITIN SERPL-MCNC: 166.2 NG/ML (ref 22–322)
FIBRINOGEN PPP-MCNC: 185 MG/DL (ref 215–460)
FOLATE SERPL-MCNC: 5.6 NG/ML
FOLATE SERPL-MCNC: 6.4 NG/ML
GAMMA GLOB SERPL ELPH-MCNC: 0.84 G/DL (ref 0.62–1.51)
GLOBULIN SER CALC-MCNC: 2.3 G/DL (ref 1.9–3.5)
GLOBULIN SER CALC-MCNC: 2.3 G/DL (ref 1.9–3.5)
GLOBULIN SER CALC-MCNC: 2.4 G/DL (ref 1.9–3.5)
GLOBULIN SER CALC-MCNC: 2.4 G/DL (ref 1.9–3.5)
GLOBULIN SER CALC-MCNC: 2.5 G/DL (ref 1.9–3.5)
GLUCOSE BLD-MCNC: 100 MG/DL (ref 65–99)
GLUCOSE BLD-MCNC: 108 MG/DL (ref 65–99)
GLUCOSE BLD-MCNC: 116 MG/DL (ref 65–99)
GLUCOSE BLD-MCNC: 120 MG/DL (ref 65–99)
GLUCOSE BLD-MCNC: 144 MG/DL (ref 65–99)
GLUCOSE BLD-MCNC: 54 MG/DL (ref 65–99)
GLUCOSE BLD-MCNC: 59 MG/DL (ref 65–99)
GLUCOSE BLD-MCNC: 70 MG/DL (ref 65–99)
GLUCOSE BLD-MCNC: 75 MG/DL (ref 65–99)
GLUCOSE BLD-MCNC: 75 MG/DL (ref 65–99)
GLUCOSE SERPL-MCNC: 111 MG/DL (ref 65–99)
GLUCOSE SERPL-MCNC: 116 MG/DL (ref 65–99)
GLUCOSE SERPL-MCNC: 59 MG/DL (ref 65–99)
GLUCOSE SERPL-MCNC: 66 MG/DL (ref 65–99)
GLUCOSE SERPL-MCNC: 79 MG/DL (ref 65–99)
GLUCOSE SERPL-MCNC: 87 MG/DL (ref 65–99)
HAPTOGLOB SERPL-MCNC: 82 MG/DL (ref 30–200)
HAV IGM SERPL QL IA: NEGATIVE
HBV CORE IGM SER QL: NEGATIVE
HBV SURFACE AG SER QL: NEGATIVE
HCT VFR BLD AUTO: 24.6 % (ref 42–52)
HCT VFR BLD AUTO: 25 % (ref 42–52)
HCT VFR BLD AUTO: 25.1 % (ref 42–52)
HCT VFR BLD AUTO: 25.7 % (ref 42–52)
HCT VFR BLD AUTO: 25.8 % (ref 42–52)
HCT VFR BLD AUTO: 25.9 % (ref 42–52)
HCT VFR BLD AUTO: 26.5 % (ref 42–52)
HCT VFR BLD AUTO: 26.6 % (ref 42–52)
HCV AB SER QL: NEGATIVE
HGB BLD-MCNC: 7.8 G/DL (ref 14–18)
HGB BLD-MCNC: 7.9 G/DL (ref 14–18)
HGB BLD-MCNC: 7.9 G/DL (ref 14–18)
HGB BLD-MCNC: 8.2 G/DL (ref 14–18)
HGB BLD-MCNC: 8.3 G/DL (ref 14–18)
HGB BLD-MCNC: 8.4 G/DL (ref 14–18)
HGB BLD-MCNC: 8.5 G/DL (ref 14–18)
HGB BLD-MCNC: 8.6 G/DL (ref 14–18)
HGB RETIC QN AUTO: 31.1 PG/CELL (ref 29–35)
HGB RETIC QN AUTO: 31.6 PG/CELL (ref 29–35)
IGA SERPL-MCNC: 265 MG/DL (ref 68–408)
IGG SERPL-MCNC: 842 MG/DL (ref 768–1632)
IGM SERPL-MCNC: 88 MG/DL (ref 35–263)
IMM GRANULOCYTES # BLD AUTO: 0.03 K/UL (ref 0–0.11)
IMM GRANULOCYTES # BLD AUTO: 0.04 K/UL (ref 0–0.11)
IMM GRANULOCYTES # BLD AUTO: 0.04 K/UL (ref 0–0.11)
IMM GRANULOCYTES # BLD AUTO: 0.06 K/UL (ref 0–0.11)
IMM GRANULOCYTES NFR BLD AUTO: 0.4 % (ref 0–0.9)
IMM GRANULOCYTES NFR BLD AUTO: 0.7 % (ref 0–0.9)
IMM GRANULOCYTES NFR BLD AUTO: 0.7 % (ref 0–0.9)
IMM GRANULOCYTES NFR BLD AUTO: 0.8 % (ref 0–0.9)
IMM GRANULOCYTES NFR BLD AUTO: 0.9 % (ref 0–0.9)
IMM RETICS NFR: 21.2 % (ref 9.3–17.4)
IMM RETICS NFR: 21.7 % (ref 9.3–17.4)
INR PPP: 1.35 (ref 0.87–1.13)
INTERPRETATION SERPL IFE-IMP: ABNORMAL
IRON SATN MFR SERPL: 97 % (ref 15–55)
IRON SERPL-MCNC: 121 UG/DL (ref 50–180)
LACTATE BLD-SCNC: 1 MMOL/L (ref 0.5–2)
LACTATE BLD-SCNC: 1.2 MMOL/L (ref 0.5–2)
LDH SERPL L TO P-CCNC: 68 U/L (ref 107–266)
LIPASE SERPL-CCNC: 13 U/L (ref 11–82)
LIPASE SERPL-CCNC: 54 U/L (ref 11–82)
LV EJECT FRACT  99904: 65
LV EJECT FRACT MOD 4C 99902: 66.7
LYMPHOCYTES # BLD AUTO: 0.27 K/UL (ref 1–4.8)
LYMPHOCYTES # BLD AUTO: 0.3 K/UL (ref 1–4.8)
LYMPHOCYTES # BLD AUTO: 0.38 K/UL (ref 1–4.8)
LYMPHOCYTES # BLD AUTO: 0.46 K/UL (ref 1–4.8)
LYMPHOCYTES # BLD AUTO: 0.56 K/UL (ref 1–4.8)
LYMPHOCYTES # BLD AUTO: 0.67 K/UL (ref 1–4.8)
LYMPHOCYTES # BLD AUTO: 0.85 K/UL (ref 1–4.8)
LYMPHOCYTES NFR BLD: 15.6 % (ref 22–41)
LYMPHOCYTES NFR BLD: 19.3 % (ref 22–41)
LYMPHOCYTES NFR BLD: 21.7 % (ref 22–41)
LYMPHOCYTES NFR BLD: 4.7 % (ref 22–41)
LYMPHOCYTES NFR BLD: 5.2 % (ref 22–41)
LYMPHOCYTES NFR BLD: 5.5 % (ref 22–41)
LYMPHOCYTES NFR BLD: 6.3 % (ref 22–41)
MAGNESIUM SERPL-MCNC: 2.1 MG/DL (ref 1.5–2.5)
MCF BLD TEG: 29.4 MM (ref 50–70)
MCF BLD TEG: 38.7 MM (ref 50–70)
MCF P HPASE BLD TEG: 34.1 MM (ref 50–70)
MCH RBC QN AUTO: 28.4 PG (ref 27–33)
MCH RBC QN AUTO: 29.1 PG (ref 27–33)
MCH RBC QN AUTO: 29.1 PG (ref 27–33)
MCH RBC QN AUTO: 29.3 PG (ref 27–33)
MCH RBC QN AUTO: 29.4 PG (ref 27–33)
MCH RBC QN AUTO: 29.4 PG (ref 27–33)
MCH RBC QN AUTO: 29.6 PG (ref 27–33)
MCHC RBC AUTO-ENTMCNC: 30.6 G/DL (ref 33.7–35.3)
MCHC RBC AUTO-ENTMCNC: 31.2 G/DL (ref 33.7–35.3)
MCHC RBC AUTO-ENTMCNC: 32 G/DL (ref 33.7–35.3)
MCHC RBC AUTO-ENTMCNC: 32.3 G/DL (ref 33.7–35.3)
MCHC RBC AUTO-ENTMCNC: 32.4 G/DL (ref 33.7–35.3)
MCHC RBC AUTO-ENTMCNC: 32.5 G/DL (ref 33.7–35.3)
MCHC RBC AUTO-ENTMCNC: 32.7 G/DL (ref 33.7–35.3)
MCV RBC AUTO: 90 FL (ref 81.4–97.8)
MCV RBC AUTO: 90.2 FL (ref 81.4–97.8)
MCV RBC AUTO: 90.4 FL (ref 81.4–97.8)
MCV RBC AUTO: 91.1 FL (ref 81.4–97.8)
MCV RBC AUTO: 91.8 FL (ref 81.4–97.8)
MCV RBC AUTO: 92.8 FL (ref 81.4–97.8)
MCV RBC AUTO: 93.3 FL (ref 81.4–97.8)
MONOCLON BAND OBS SERPL: ABNORMAL
MONOCYTES # BLD AUTO: 0.24 K/UL (ref 0–0.85)
MONOCYTES # BLD AUTO: 0.27 K/UL (ref 0–0.85)
MONOCYTES # BLD AUTO: 0.29 K/UL (ref 0–0.85)
MONOCYTES # BLD AUTO: 0.3 K/UL (ref 0–0.85)
MONOCYTES # BLD AUTO: 0.32 K/UL (ref 0–0.85)
MONOCYTES # BLD AUTO: 0.35 K/UL (ref 0–0.85)
MONOCYTES # BLD AUTO: 0.39 K/UL (ref 0–0.85)
MONOCYTES NFR BLD AUTO: 4.4 % (ref 0–13.4)
MONOCYTES NFR BLD AUTO: 4.8 % (ref 0–13.4)
MONOCYTES NFR BLD AUTO: 5.2 % (ref 0–13.4)
MONOCYTES NFR BLD AUTO: 5.9 % (ref 0–13.4)
MONOCYTES NFR BLD AUTO: 6.9 % (ref 0–13.4)
MONOCYTES NFR BLD AUTO: 7.5 % (ref 0–13.4)
MONOCYTES NFR BLD AUTO: 9 % (ref 0–13.4)
MORPHOLOGY BLD-IMP: NORMAL
MORPHOLOGY BLD-IMP: NORMAL
NEUTROPHILS # BLD AUTO: 2.52 K/UL (ref 1.82–7.42)
NEUTROPHILS # BLD AUTO: 2.65 K/UL (ref 1.82–7.42)
NEUTROPHILS # BLD AUTO: 2.68 K/UL (ref 1.82–7.42)
NEUTROPHILS # BLD AUTO: 4.29 K/UL (ref 1.82–7.42)
NEUTROPHILS # BLD AUTO: 5.14 K/UL (ref 1.82–7.42)
NEUTROPHILS # BLD AUTO: 6.42 K/UL (ref 1.82–7.42)
NEUTROPHILS # BLD AUTO: 7.28 K/UL (ref 1.82–7.42)
NEUTROPHILS NFR BLD: 67.7 % (ref 44–72)
NEUTROPHILS NFR BLD: 72.3 % (ref 44–72)
NEUTROPHILS NFR BLD: 75 % (ref 44–72)
NEUTROPHILS NFR BLD: 87.6 % (ref 44–72)
NEUTROPHILS NFR BLD: 88.7 % (ref 44–72)
NEUTROPHILS NFR BLD: 88.7 % (ref 44–72)
NEUTROPHILS NFR BLD: 89.6 % (ref 44–72)
NRBC # BLD AUTO: 0 K/UL
NRBC BLD-RTO: 0 /100 WBC
NT-PROBNP SERPL IA-MCNC: ABNORMAL PG/ML (ref 0–125)
OSMOLALITY UR: 363 MOSM/KG H2O (ref 300–900)
PA AA BLD-ACNC: 56 %
PA ADP BLD-ACNC: 39.2 %
PATHOLOGY CONSULT NOTE: NORMAL
PATHOLOGY STUDY: ABNORMAL
PHOSPHATE SERPL-MCNC: 6.4 MG/DL (ref 2.5–4.5)
PLATELET # BLD AUTO: 25 K/UL (ref 164–446)
PLATELET # BLD AUTO: 26 K/UL (ref 164–446)
PLATELET # BLD AUTO: 27 K/UL (ref 164–446)
PLATELET # BLD AUTO: 30 K/UL (ref 164–446)
PLATELET # BLD AUTO: 35 K/UL (ref 164–446)
PLATELET # BLD AUTO: 40 K/UL (ref 164–446)
PLATELET # BLD AUTO: 47 K/UL (ref 164–446)
PLATELET BLD QL SMEAR: NORMAL
PLATELETS.RETICULATED NFR BLD AUTO: 6.1 K/UL (ref 0.6–13.1)
PLATELETS.RETICULATED NFR BLD AUTO: 6.9 K/UL (ref 0.6–13.1)
PMV BLD AUTO: 10.8 FL (ref 9–12.9)
PMV BLD AUTO: 11.3 FL (ref 9–12.9)
PMV BLD AUTO: 11.4 FL (ref 9–12.9)
PMV BLD AUTO: 11.9 FL (ref 9–12.9)
PMV BLD AUTO: 12 FL (ref 9–12.9)
PMV BLD AUTO: 12.7 FL (ref 9–12.9)
PMV BLD AUTO: 12.9 FL (ref 9–12.9)
POTASSIUM SERPL-SCNC: 4.4 MMOL/L (ref 3.6–5.5)
POTASSIUM SERPL-SCNC: 4.5 MMOL/L (ref 3.6–5.5)
POTASSIUM SERPL-SCNC: 4.6 MMOL/L (ref 3.6–5.5)
POTASSIUM SERPL-SCNC: 4.6 MMOL/L (ref 3.6–5.5)
POTASSIUM SERPL-SCNC: 4.7 MMOL/L (ref 3.6–5.5)
POTASSIUM SERPL-SCNC: 4.7 MMOL/L (ref 3.6–5.5)
PROCALCITONIN SERPL-MCNC: 0.83 NG/ML
PRODUCT TYPE UPROD: NORMAL
PROT SERPL-MCNC: 4.7 G/DL (ref 6–8.2)
PROT SERPL-MCNC: 4.9 G/DL (ref 6.3–8.2)
PROT SERPL-MCNC: 4.9 G/DL (ref 6–8.2)
PROT SERPL-MCNC: 4.9 G/DL (ref 6–8.2)
PROT SERPL-MCNC: 5.1 G/DL (ref 6–8.2)
PROT SERPL-MCNC: 5.6 G/DL (ref 6–8.2)
PROTHROMBIN TIME: 17 SEC (ref 12–14.6)
RBC # BLD AUTO: 2.68 M/UL (ref 4.7–6.1)
RBC # BLD AUTO: 2.78 M/UL (ref 4.7–6.1)
RBC # BLD AUTO: 2.79 M/UL (ref 4.7–6.1)
RBC # BLD AUTO: 2.8 M/UL (ref 4.7–6.1)
RBC # BLD AUTO: 2.87 M/UL (ref 4.7–6.1)
RBC # BLD AUTO: 2.92 M/UL (ref 4.7–6.1)
RBC # BLD AUTO: 2.93 M/UL (ref 4.7–6.1)
RETICS # AUTO: 0.09 M/UL (ref 0.04–0.06)
RETICS # AUTO: 0.09 M/UL (ref 0.04–0.06)
RETICS/RBC NFR: 3 % (ref 0.8–2.1)
RETICS/RBC NFR: 3.1 % (ref 0.8–2.1)
RH BLD: NORMAL
SODIUM SERPL-SCNC: 136 MMOL/L (ref 135–145)
SODIUM SERPL-SCNC: 136 MMOL/L (ref 135–145)
SODIUM SERPL-SCNC: 137 MMOL/L (ref 135–145)
SODIUM SERPL-SCNC: 137 MMOL/L (ref 135–145)
SODIUM SERPL-SCNC: 138 MMOL/L (ref 135–145)
SODIUM SERPL-SCNC: 139 MMOL/L (ref 135–145)
SODIUM UR-SCNC: 56 MMOL/L
TEG ALGORITHM TGALG: ABNORMAL
TEG ALGORITHM TGALG: ABNORMAL
TIBC SERPL-MCNC: 125 UG/DL (ref 250–450)
UNIT STATUS USTAT: NORMAL
VIT B12 SERPL-MCNC: >1500 PG/ML (ref 211–911)
VIT B12 SERPL-MCNC: >1500 PG/ML (ref 211–911)
WBC # BLD AUTO: 3.5 K/UL (ref 4.8–10.8)
WBC # BLD AUTO: 3.6 K/UL (ref 4.8–10.8)
WBC # BLD AUTO: 3.9 K/UL (ref 4.8–10.8)
WBC # BLD AUTO: 4.9 K/UL (ref 4.8–10.8)
WBC # BLD AUTO: 5.8 K/UL (ref 4.8–10.8)
WBC # BLD AUTO: 7.3 K/UL (ref 4.8–10.8)
WBC # BLD AUTO: 8.1 K/UL (ref 4.8–10.8)

## 2020-01-01 PROCEDURE — 96375 TX/PRO/DX INJ NEW DRUG ADDON: CPT

## 2020-01-01 PROCEDURE — 700111 HCHG RX REV CODE 636 W/ 250 OVERRIDE (IP): Performed by: EMERGENCY MEDICINE

## 2020-01-01 PROCEDURE — 76775 US EXAM ABDO BACK WALL LIM: CPT

## 2020-01-01 PROCEDURE — 83935 ASSAY OF URINE OSMOLALITY: CPT

## 2020-01-01 PROCEDURE — 700111 HCHG RX REV CODE 636 W/ 250 OVERRIDE (IP): Performed by: STUDENT IN AN ORGANIZED HEALTH CARE EDUCATION/TRAINING PROGRAM

## 2020-01-01 PROCEDURE — 36556 INSERT NON-TUNNEL CV CATH: CPT | Mod: LT,GC | Performed by: INTERNAL MEDICINE

## 2020-01-01 PROCEDURE — 88341 IMHCHEM/IMCYTCHM EA ADD ANTB: CPT

## 2020-01-01 PROCEDURE — 80307 DRUG TEST PRSMV CHEM ANLYZR: CPT

## 2020-01-01 PROCEDURE — 700111 HCHG RX REV CODE 636 W/ 250 OVERRIDE (IP): Performed by: HOSPITALIST

## 2020-01-01 PROCEDURE — 38222 DX BONE MARROW BX & ASPIR: CPT | Performed by: HOSPITALIST

## 2020-01-01 PROCEDURE — 85384 FIBRINOGEN ACTIVITY: CPT

## 2020-01-01 PROCEDURE — 160048 HCHG OR STATISTICAL LEVEL 1-5: Performed by: HOSPITALIST

## 2020-01-01 PROCEDURE — 07DR3ZX EXTRACTION OF ILIAC BONE MARROW, PERCUTANEOUS APPROACH, DIAGNOSTIC: ICD-10-PCS | Performed by: HOSPITALIST

## 2020-01-01 PROCEDURE — 85576 BLOOD PLATELET AGGREGATION: CPT | Mod: 91

## 2020-01-01 PROCEDURE — 85055 RETICULATED PLATELET ASSAY: CPT

## 2020-01-01 PROCEDURE — 84100 ASSAY OF PHOSPHORUS: CPT

## 2020-01-01 PROCEDURE — 96367 TX/PROPH/DG ADDL SEQ IV INF: CPT

## 2020-01-01 PROCEDURE — 99152 MOD SED SAME PHYS/QHP 5/>YRS: CPT | Performed by: HOSPITALIST

## 2020-01-01 PROCEDURE — 99153 MOD SED SAME PHYS/QHP EA: CPT | Performed by: HOSPITALIST

## 2020-01-01 PROCEDURE — 88360 TUMOR IMMUNOHISTOCHEM/MANUAL: CPT

## 2020-01-01 PROCEDURE — P9016 RBC LEUKOCYTES REDUCED: HCPCS

## 2020-01-01 PROCEDURE — 85046 RETICYTE/HGB CONCENTRATE: CPT

## 2020-01-01 PROCEDURE — 96365 THER/PROPH/DIAG IV INF INIT: CPT

## 2020-01-01 PROCEDURE — 83615 LACTATE (LD) (LDH) ENZYME: CPT

## 2020-01-01 PROCEDURE — 86850 RBC ANTIBODY SCREEN: CPT

## 2020-01-01 PROCEDURE — 94760 N-INVAS EAR/PLS OXIMETRY 1: CPT

## 2020-01-01 PROCEDURE — 82784 ASSAY IGA/IGD/IGG/IGM EACH: CPT

## 2020-01-01 PROCEDURE — 85018 HEMOGLOBIN: CPT

## 2020-01-01 PROCEDURE — 36556 INSERT NON-TUNNEL CV CATH: CPT

## 2020-01-01 PROCEDURE — P9034 PLATELETS, PHERESIS: HCPCS

## 2020-01-01 PROCEDURE — C1751 CATH, INF, PER/CENT/MIDLINE: HCPCS | Performed by: HOSPITALIST

## 2020-01-01 PROCEDURE — 83540 ASSAY OF IRON: CPT

## 2020-01-01 PROCEDURE — 83690 ASSAY OF LIPASE: CPT

## 2020-01-01 PROCEDURE — 86923 COMPATIBILITY TEST ELECTRIC: CPT

## 2020-01-01 PROCEDURE — A9270 NON-COVERED ITEM OR SERVICE: HCPCS | Performed by: STUDENT IN AN ORGANIZED HEALTH CARE EDUCATION/TRAINING PROGRAM

## 2020-01-01 PROCEDURE — C9113 INJ PANTOPRAZOLE SODIUM, VIA: HCPCS | Performed by: STUDENT IN AN ORGANIZED HEALTH CARE EDUCATION/TRAINING PROGRAM

## 2020-01-01 PROCEDURE — 160038 HCHG SURGERY MINUTES - EA ADDL 1 MIN LEVEL 2: Performed by: HOSPITALIST

## 2020-01-01 PROCEDURE — 85730 THROMBOPLASTIN TIME PARTIAL: CPT

## 2020-01-01 PROCEDURE — 30233N1 TRANSFUSION OF NONAUTOLOGOUS RED BLOOD CELLS INTO PERIPHERAL VEIN, PERCUTANEOUS APPROACH: ICD-10-PCS | Performed by: INTERNAL MEDICINE

## 2020-01-01 PROCEDURE — 700101 HCHG RX REV CODE 250: Performed by: INTERNAL MEDICINE

## 2020-01-01 PROCEDURE — 93306 TTE W/DOPPLER COMPLETE: CPT | Mod: 26 | Performed by: INTERNAL MEDICINE

## 2020-01-01 PROCEDURE — 84145 PROCALCITONIN (PCT): CPT

## 2020-01-01 PROCEDURE — 84165 PROTEIN E-PHORESIS SERUM: CPT

## 2020-01-01 PROCEDURE — 700105 HCHG RX REV CODE 258: Performed by: INTERNAL MEDICINE

## 2020-01-01 PROCEDURE — 700101 HCHG RX REV CODE 250: Performed by: STUDENT IN AN ORGANIZED HEALTH CARE EDUCATION/TRAINING PROGRAM

## 2020-01-01 PROCEDURE — 99291 CRITICAL CARE FIRST HOUR: CPT | Mod: 25 | Performed by: INTERNAL MEDICINE

## 2020-01-01 PROCEDURE — 85025 COMPLETE CBC W/AUTO DIFF WBC: CPT

## 2020-01-01 PROCEDURE — 700105 HCHG RX REV CODE 258: Performed by: STUDENT IN AN ORGANIZED HEALTH CARE EDUCATION/TRAINING PROGRAM

## 2020-01-01 PROCEDURE — 99223 1ST HOSP IP/OBS HIGH 75: CPT | Mod: GC | Performed by: HOSPITALIST

## 2020-01-01 PROCEDURE — 82728 ASSAY OF FERRITIN: CPT

## 2020-01-01 PROCEDURE — 83010 ASSAY OF HAPTOGLOBIN QUANT: CPT

## 2020-01-01 PROCEDURE — 80053 COMPREHEN METABOLIC PANEL: CPT

## 2020-01-01 PROCEDURE — P9034 PLATELETS, PHERESIS: HCPCS | Mod: 91

## 2020-01-01 PROCEDURE — P9012 CRYOPRECIPITATE EACH UNIT: HCPCS

## 2020-01-01 PROCEDURE — C1751 CATH, INF, PER/CENT/MIDLINE: HCPCS

## 2020-01-01 PROCEDURE — 302140 GU CART: Performed by: HOSPITALIST

## 2020-01-01 PROCEDURE — 99291 CRITICAL CARE FIRST HOUR: CPT | Performed by: INTERNAL MEDICINE

## 2020-01-01 PROCEDURE — 83605 ASSAY OF LACTIC ACID: CPT

## 2020-01-01 PROCEDURE — 88342 IMHCHEM/IMCYTCHM 1ST ANTB: CPT

## 2020-01-01 PROCEDURE — 88369 M/PHMTRC ALYSISHQUANT/SEMIQ: CPT

## 2020-01-01 PROCEDURE — 82607 VITAMIN B-12: CPT

## 2020-01-01 PROCEDURE — 71045 X-RAY EXAM CHEST 1 VIEW: CPT

## 2020-01-01 PROCEDURE — 99291 CRITICAL CARE FIRST HOUR: CPT

## 2020-01-01 PROCEDURE — 86880 COOMBS TEST DIRECT: CPT | Mod: 91

## 2020-01-01 PROCEDURE — 160002 HCHG RECOVERY MINUTES (STAT): Performed by: HOSPITALIST

## 2020-01-01 PROCEDURE — 82570 ASSAY OF URINE CREATININE: CPT

## 2020-01-01 PROCEDURE — 88313 SPECIAL STAINS GROUP 2: CPT | Mod: 91

## 2020-01-01 PROCEDURE — 93005 ELECTROCARDIOGRAM TRACING: CPT | Performed by: STUDENT IN AN ORGANIZED HEALTH CARE EDUCATION/TRAINING PROGRAM

## 2020-01-01 PROCEDURE — 85610 PROTHROMBIN TIME: CPT

## 2020-01-01 PROCEDURE — 82962 GLUCOSE BLOOD TEST: CPT | Mod: 91

## 2020-01-01 PROCEDURE — 700102 HCHG RX REV CODE 250 W/ 637 OVERRIDE(OP): Performed by: STUDENT IN AN ORGANIZED HEALTH CARE EDUCATION/TRAINING PROGRAM

## 2020-01-01 PROCEDURE — 99231 SBSQ HOSP IP/OBS SF/LOW 25: CPT | Mod: GC | Performed by: INTERNAL MEDICINE

## 2020-01-01 PROCEDURE — 302140 GU CART: Performed by: INTERNAL MEDICINE

## 2020-01-01 PROCEDURE — 700105 HCHG RX REV CODE 258: Performed by: EMERGENCY MEDICINE

## 2020-01-01 PROCEDURE — 160027 HCHG SURGERY MINUTES - 1ST 30 MINS LEVEL 2: Performed by: HOSPITALIST

## 2020-01-01 PROCEDURE — 88368 INSITU HYBRIDIZATION MANUAL: CPT

## 2020-01-01 PROCEDURE — 99292 CRITICAL CARE ADDL 30 MIN: CPT | Performed by: INTERNAL MEDICINE

## 2020-01-01 PROCEDURE — 85025 COMPLETE CBC W/AUTO DIFF WBC: CPT | Mod: 91

## 2020-01-01 PROCEDURE — 86901 BLOOD TYPING SEROLOGIC RH(D): CPT

## 2020-01-01 PROCEDURE — 82746 ASSAY OF FOLIC ACID SERUM: CPT

## 2020-01-01 PROCEDURE — 93005 ELECTROCARDIOGRAM TRACING: CPT | Performed by: HOSPITALIST

## 2020-01-01 PROCEDURE — 02HV33Z INSERTION OF INFUSION DEVICE INTO SUPERIOR VENA CAVA, PERCUTANEOUS APPROACH: ICD-10-PCS | Performed by: INTERNAL MEDICINE

## 2020-01-01 PROCEDURE — 83880 ASSAY OF NATRIURETIC PEPTIDE: CPT

## 2020-01-01 PROCEDURE — 770022 HCHG ROOM/CARE - ICU (200)

## 2020-01-01 PROCEDURE — 85347 COAGULATION TIME ACTIVATED: CPT

## 2020-01-01 PROCEDURE — 96366 THER/PROPH/DIAG IV INF ADDON: CPT

## 2020-01-01 PROCEDURE — 88305 TISSUE EXAM BY PATHOLOGIST: CPT

## 2020-01-01 PROCEDURE — 83550 IRON BINDING TEST: CPT

## 2020-01-01 PROCEDURE — 93306 TTE W/DOPPLER COMPLETE: CPT

## 2020-01-01 PROCEDURE — 94640 AIRWAY INHALATION TREATMENT: CPT

## 2020-01-01 PROCEDURE — 85014 HEMATOCRIT: CPT

## 2020-01-01 PROCEDURE — 36430 TRANSFUSION BLD/BLD COMPNT: CPT

## 2020-01-01 PROCEDURE — 86334 IMMUNOFIX E-PHORESIS SERUM: CPT

## 2020-01-01 PROCEDURE — 160035 HCHG PACU - 1ST 60 MINS PHASE I: Performed by: HOSPITALIST

## 2020-01-01 PROCEDURE — 84300 ASSAY OF URINE SODIUM: CPT

## 2020-01-01 PROCEDURE — 99292 CRITICAL CARE ADDL 30 MIN: CPT | Mod: 25 | Performed by: INTERNAL MEDICINE

## 2020-01-01 PROCEDURE — 88184 FLOWCYTOMETRY/ TC 1 MARKER: CPT

## 2020-01-01 PROCEDURE — 83735 ASSAY OF MAGNESIUM: CPT

## 2020-01-01 PROCEDURE — 74176 CT ABD & PELVIS W/O CONTRAST: CPT

## 2020-01-01 PROCEDURE — 80048 BASIC METABOLIC PNL TOTAL CA: CPT

## 2020-01-01 PROCEDURE — 80074 ACUTE HEPATITIS PANEL: CPT

## 2020-01-01 PROCEDURE — 86900 BLOOD TYPING SEROLOGIC ABO: CPT

## 2020-01-01 PROCEDURE — 84155 ASSAY OF PROTEIN SERUM: CPT

## 2020-01-01 PROCEDURE — 88185 FLOWCYTOMETRY/TC ADD-ON: CPT | Mod: 91

## 2020-01-01 PROCEDURE — 85379 FIBRIN DEGRADATION QUANT: CPT

## 2020-01-01 RX ORDER — ALBUTEROL SULFATE 90 UG/1
2 AEROSOL, METERED RESPIRATORY (INHALATION) EVERY 4 HOURS PRN
Status: DISCONTINUED | OUTPATIENT
Start: 2020-01-01 | End: 2020-01-01

## 2020-01-01 RX ORDER — BISMUTH SUBSALICYLATE 262 MG/1
524 TABLET, CHEWABLE ORAL 4 TIMES DAILY PRN
COMMUNITY

## 2020-01-01 RX ORDER — LORAZEPAM 2 MG/ML
0.5 CONCENTRATE ORAL
Status: DISCONTINUED | OUTPATIENT
Start: 2020-01-01 | End: 2020-01-01 | Stop reason: HOSPADM

## 2020-01-01 RX ORDER — ALBUTEROL SULFATE 90 UG/1
2 AEROSOL, METERED RESPIRATORY (INHALATION) EVERY 6 HOURS PRN
Qty: 8.5 G | Refills: 0 | Status: SHIPPED
Start: 2020-01-01 | End: 2020-01-01

## 2020-01-01 RX ORDER — ACETAMINOPHEN 325 MG/1
650 TABLET ORAL EVERY 4 HOURS PRN
Status: DISCONTINUED | OUTPATIENT
Start: 2020-01-01 | End: 2020-01-01 | Stop reason: HOSPADM

## 2020-01-01 RX ORDER — MORPHINE SULFATE 4 MG/ML
2-5 INJECTION, SOLUTION INTRAMUSCULAR; INTRAVENOUS
Status: DISCONTINUED | OUTPATIENT
Start: 2020-01-01 | End: 2020-01-01 | Stop reason: HOSPADM

## 2020-01-01 RX ORDER — MORPHINE SULFATE 4 MG/ML
1 INJECTION, SOLUTION INTRAMUSCULAR; INTRAVENOUS ONCE
Status: COMPLETED | OUTPATIENT
Start: 2020-01-01 | End: 2020-01-01

## 2020-01-01 RX ORDER — AMOXICILLIN 250 MG
2 CAPSULE ORAL 2 TIMES DAILY
Status: DISCONTINUED | OUTPATIENT
Start: 2020-01-01 | End: 2020-01-01

## 2020-01-01 RX ORDER — MORPHINE SULFATE 4 MG/ML
0.25 INJECTION, SOLUTION INTRAMUSCULAR; INTRAVENOUS ONCE
Status: COMPLETED | OUTPATIENT
Start: 2020-01-01 | End: 2020-01-01

## 2020-01-01 RX ORDER — FUROSEMIDE 10 MG/ML
20 INJECTION INTRAMUSCULAR; INTRAVENOUS ONCE
Status: COMPLETED | OUTPATIENT
Start: 2020-01-01 | End: 2020-01-01

## 2020-01-01 RX ORDER — ONDANSETRON 4 MG/1
4 TABLET, ORALLY DISINTEGRATING ORAL EVERY 4 HOURS PRN
Status: DISCONTINUED | OUTPATIENT
Start: 2020-01-01 | End: 2020-01-01 | Stop reason: HOSPADM

## 2020-01-01 RX ORDER — ONDANSETRON 2 MG/ML
4 INJECTION INTRAMUSCULAR; INTRAVENOUS EVERY 4 HOURS PRN
Status: DISCONTINUED | OUTPATIENT
Start: 2020-01-01 | End: 2020-01-01 | Stop reason: HOSPADM

## 2020-01-01 RX ORDER — SODIUM CHLORIDE 9 MG/ML
500 INJECTION, SOLUTION INTRAVENOUS
Status: DISCONTINUED | OUTPATIENT
Start: 2020-01-01 | End: 2020-01-01 | Stop reason: HOSPADM

## 2020-01-01 RX ORDER — FUROSEMIDE 10 MG/ML
40 INJECTION INTRAMUSCULAR; INTRAVENOUS ONCE
Status: COMPLETED | OUTPATIENT
Start: 2020-01-01 | End: 2020-01-01

## 2020-01-01 RX ORDER — BISACODYL 10 MG
10 SUPPOSITORY, RECTAL RECTAL
Status: DISCONTINUED | OUTPATIENT
Start: 2020-01-01 | End: 2020-01-01

## 2020-01-01 RX ORDER — SODIUM CHLORIDE 9 MG/ML
INJECTION, SOLUTION INTRAVENOUS CONTINUOUS
Status: ACTIVE | OUTPATIENT
Start: 2020-01-01 | End: 2020-01-01

## 2020-01-01 RX ORDER — ATROPINE SULFATE 10 MG/ML
2 SOLUTION/ DROPS OPHTHALMIC EVERY 4 HOURS PRN
Status: DISCONTINUED | OUTPATIENT
Start: 2020-01-01 | End: 2020-01-01 | Stop reason: HOSPADM

## 2020-01-01 RX ORDER — POLYETHYLENE GLYCOL 3350 17 G/17G
1 POWDER, FOR SOLUTION ORAL
Status: DISCONTINUED | OUTPATIENT
Start: 2020-01-01 | End: 2020-01-01

## 2020-01-01 RX ORDER — TAMSULOSIN HYDROCHLORIDE 0.4 MG/1
0.4 CAPSULE ORAL DAILY
Status: DISCONTINUED | OUTPATIENT
Start: 2020-01-01 | End: 2020-01-01

## 2020-01-01 RX ORDER — MORPHINE SULFATE 4 MG/ML
0.5 INJECTION, SOLUTION INTRAMUSCULAR; INTRAVENOUS
Status: DISCONTINUED | OUTPATIENT
Start: 2020-01-01 | End: 2020-01-01 | Stop reason: HOSPADM

## 2020-01-01 RX ORDER — ACETAMINOPHEN 650 MG/1
650 SUPPOSITORY RECTAL EVERY 4 HOURS PRN
Status: DISCONTINUED | OUTPATIENT
Start: 2020-01-01 | End: 2020-01-01 | Stop reason: HOSPADM

## 2020-01-01 RX ORDER — BUDESONIDE AND FORMOTEROL FUMARATE DIHYDRATE 80; 4.5 UG/1; UG/1
2 AEROSOL RESPIRATORY (INHALATION) 2 TIMES DAILY
Status: DISCONTINUED | OUTPATIENT
Start: 2020-01-01 | End: 2020-01-01

## 2020-01-01 RX ORDER — SODIUM CHLORIDE 9 MG/ML
1000 INJECTION, SOLUTION INTRAVENOUS ONCE
Status: COMPLETED | OUTPATIENT
Start: 2020-01-01 | End: 2020-01-01

## 2020-01-01 RX ORDER — SODIUM CHLORIDE, SODIUM LACTATE, POTASSIUM CHLORIDE, CALCIUM CHLORIDE 600; 310; 30; 20 MG/100ML; MG/100ML; MG/100ML; MG/100ML
INJECTION, SOLUTION INTRAVENOUS CONTINUOUS
Status: DISCONTINUED | OUTPATIENT
Start: 2020-01-01 | End: 2020-01-01

## 2020-01-01 RX ORDER — LORAZEPAM 2 MG/ML
0.5 INJECTION INTRAMUSCULAR
Status: DISCONTINUED | OUTPATIENT
Start: 2020-01-01 | End: 2020-01-01 | Stop reason: HOSPADM

## 2020-01-01 RX ORDER — PANTOPRAZOLE SODIUM 40 MG/10ML
40 INJECTION, POWDER, LYOPHILIZED, FOR SOLUTION INTRAVENOUS 2 TIMES DAILY
Status: DISCONTINUED | OUTPATIENT
Start: 2020-01-01 | End: 2020-01-01

## 2020-01-01 RX ORDER — MIDAZOLAM HYDROCHLORIDE 1 MG/ML
.5-2 INJECTION INTRAMUSCULAR; INTRAVENOUS PRN
Status: DISCONTINUED | OUTPATIENT
Start: 2020-01-01 | End: 2020-01-01 | Stop reason: HOSPADM

## 2020-01-01 RX ORDER — THIAMINE HCL 100 MG
2500 TABLET ORAL EVERY MORNING
Status: DISCONTINUED | OUTPATIENT
Start: 2020-01-01 | End: 2020-01-01

## 2020-01-01 RX ORDER — ALBUTEROL SULFATE 90 UG/1
2 AEROSOL, METERED RESPIRATORY (INHALATION) EVERY 4 HOURS PRN
Qty: 8.5 G | Refills: 0 | OUTPATIENT
Start: 2020-01-01

## 2020-01-01 RX ORDER — IPRATROPIUM BROMIDE AND ALBUTEROL SULFATE 2.5; .5 MG/3ML; MG/3ML
3 SOLUTION RESPIRATORY (INHALATION)
Status: DISCONTINUED | OUTPATIENT
Start: 2020-01-01 | End: 2020-01-01

## 2020-01-01 RX ORDER — BUDESONIDE AND FORMOTEROL FUMARATE DIHYDRATE 80; 4.5 UG/1; UG/1
2 AEROSOL RESPIRATORY (INHALATION) 2 TIMES DAILY
Qty: 1 INHALER | Refills: 0 | Status: SHIPPED
Start: 2020-01-01 | End: 2020-01-01

## 2020-01-01 RX ORDER — NYSTATIN AND TRIAMCINOLONE ACETONIDE 100000; 1 [USP'U]/G; MG/G
1 OINTMENT TOPICAL 2 TIMES DAILY PRN
COMMUNITY

## 2020-01-01 RX ORDER — DIPHENHYDRAMINE HCL 25 MG
50 TABLET ORAL
Status: DISCONTINUED | OUTPATIENT
Start: 2020-01-01 | End: 2020-01-01

## 2020-01-01 RX ORDER — MIDAZOLAM HYDROCHLORIDE 1 MG/ML
INJECTION INTRAMUSCULAR; INTRAVENOUS
Status: DISCONTINUED | OUTPATIENT
Start: 2020-01-01 | End: 2020-01-01 | Stop reason: HOSPADM

## 2020-01-01 RX ORDER — MIDODRINE HYDROCHLORIDE 5 MG/1
2.5 TABLET ORAL EVERY 8 HOURS
Status: DISCONTINUED | OUTPATIENT
Start: 2020-01-01 | End: 2020-01-01

## 2020-01-01 RX ADMIN — MORPHINE SULFATE 0.5 MG: 4 INJECTION INTRAVENOUS at 19:34

## 2020-01-01 RX ADMIN — LORAZEPAM 0.5 MG: 2 INJECTION INTRAMUSCULAR; INTRAVENOUS at 19:31

## 2020-01-01 RX ADMIN — DEXTROSE MONOHYDRATE 250 ML: 100 INJECTION, SOLUTION INTRAVENOUS at 08:38

## 2020-01-01 RX ADMIN — ONDANSETRON 4 MG: 2 INJECTION INTRAMUSCULAR; INTRAVENOUS at 16:16

## 2020-01-01 RX ADMIN — MORPHINE SULFATE 4 MG: 4 INJECTION INTRAVENOUS at 19:40

## 2020-01-01 RX ADMIN — DEXTROSE MONOHYDRATE 250 ML: 100 INJECTION, SOLUTION INTRAVENOUS at 02:19

## 2020-01-01 RX ADMIN — LORAZEPAM 0.5 MG: 2 INJECTION INTRAMUSCULAR; INTRAVENOUS at 23:14

## 2020-01-01 RX ADMIN — SODIUM CHLORIDE, POTASSIUM CHLORIDE, SODIUM LACTATE AND CALCIUM CHLORIDE: 600; 310; 30; 20 INJECTION, SOLUTION INTRAVENOUS at 08:45

## 2020-01-01 RX ADMIN — IPRATROPIUM BROMIDE AND ALBUTEROL SULFATE 3 ML: .5; 3 SOLUTION RESPIRATORY (INHALATION) at 01:02

## 2020-01-01 RX ADMIN — MORPHINE SULFATE 0.25 MG: 4 INJECTION INTRAVENOUS at 14:38

## 2020-01-01 RX ADMIN — MORPHINE SULFATE 4 MG: 4 INJECTION INTRAVENOUS at 12:11

## 2020-01-01 RX ADMIN — CEFTRIAXONE SODIUM 1 G: 1 INJECTION, POWDER, FOR SOLUTION INTRAMUSCULAR; INTRAVENOUS at 20:22

## 2020-01-01 RX ADMIN — MORPHINE SULFATE 1 MG: 4 INJECTION INTRAVENOUS at 00:06

## 2020-01-01 RX ADMIN — FUROSEMIDE 20 MG: 10 INJECTION, SOLUTION INTRAMUSCULAR; INTRAVENOUS at 22:50

## 2020-01-01 RX ADMIN — LORAZEPAM 0.5 MG: 2 INJECTION INTRAMUSCULAR; INTRAVENOUS at 21:50

## 2020-01-01 RX ADMIN — SENNOSIDES AND DOCUSATE SODIUM 2 TABLET: 8.6; 5 TABLET ORAL at 06:46

## 2020-01-01 RX ADMIN — PANTOPRAZOLE SODIUM 40 MG: 40 INJECTION, POWDER, LYOPHILIZED, FOR SOLUTION INTRAVENOUS at 20:04

## 2020-01-01 RX ADMIN — METRONIDAZOLE 500 MG: 500 INJECTION, SOLUTION INTRAVENOUS at 05:13

## 2020-01-01 RX ADMIN — LORAZEPAM 0.5 MG: 2 INJECTION INTRAMUSCULAR; INTRAVENOUS at 17:33

## 2020-01-01 RX ADMIN — MORPHINE SULFATE 4 MG: 4 INJECTION INTRAVENOUS at 15:36

## 2020-01-01 RX ADMIN — LORAZEPAM 0.5 MG: 2 INJECTION INTRAMUSCULAR; INTRAVENOUS at 13:54

## 2020-01-01 RX ADMIN — LORAZEPAM 0.5 MG: 2 INJECTION INTRAMUSCULAR; INTRAVENOUS at 11:08

## 2020-01-01 RX ADMIN — BUDESONIDE AND FORMOTEROL FUMARATE DIHYDRATE 2 PUFF: 80; 4.5 AEROSOL RESPIRATORY (INHALATION) at 05:17

## 2020-01-01 RX ADMIN — MORPHINE SULFATE 4 MG: 4 INJECTION INTRAVENOUS at 17:35

## 2020-01-01 RX ADMIN — LORAZEPAM 0.5 MG: 2 INJECTION INTRAMUSCULAR; INTRAVENOUS at 18:40

## 2020-01-01 RX ADMIN — Medication 4 G: at 06:07

## 2020-01-01 RX ADMIN — ALBUTEROL SULFATE 2 PUFF: 90 AEROSOL, METERED RESPIRATORY (INHALATION) at 23:00

## 2020-01-01 RX ADMIN — PANTOPRAZOLE SODIUM 40 MG: 40 INJECTION, POWDER, LYOPHILIZED, FOR SOLUTION INTRAVENOUS at 09:28

## 2020-01-01 RX ADMIN — LORAZEPAM 0.5 MG: 2 INJECTION INTRAMUSCULAR; INTRAVENOUS at 01:42

## 2020-01-01 RX ADMIN — NOREPINEPHRINE BITARTRATE 5 MCG/MIN: 1 INJECTION INTRAVENOUS at 14:40

## 2020-01-01 RX ADMIN — LORAZEPAM 0.5 MG: 2 INJECTION INTRAMUSCULAR; INTRAVENOUS at 20:39

## 2020-01-01 RX ADMIN — BUDESONIDE AND FORMOTEROL FUMARATE DIHYDRATE 2 PUFF: 80; 4.5 AEROSOL RESPIRATORY (INHALATION) at 20:09

## 2020-01-01 RX ADMIN — DEXTROSE MONOHYDRATE 250 ML: 100 INJECTION, SOLUTION INTRAVENOUS at 06:05

## 2020-01-01 RX ADMIN — SODIUM CHLORIDE 1000 ML: 9 INJECTION, SOLUTION INTRAVENOUS at 23:04

## 2020-01-01 RX ADMIN — LORAZEPAM 0.5 MG: 2 INJECTION INTRAMUSCULAR; INTRAVENOUS at 04:46

## 2020-01-01 RX ADMIN — MORPHINE SULFATE 4 MG: 4 INJECTION INTRAVENOUS at 20:30

## 2020-01-01 RX ADMIN — LORAZEPAM 0.5 MG: 2 INJECTION INTRAMUSCULAR; INTRAVENOUS at 16:17

## 2020-01-01 RX ADMIN — LORAZEPAM 0.5 MG: 2 INJECTION INTRAMUSCULAR; INTRAVENOUS at 15:20

## 2020-01-01 RX ADMIN — MORPHINE SULFATE 4 MG: 4 INJECTION INTRAVENOUS at 01:08

## 2020-01-01 RX ADMIN — SODIUM CHLORIDE: 9 INJECTION, SOLUTION INTRAVENOUS at 16:13

## 2020-01-01 RX ADMIN — BUDESONIDE AND FORMOTEROL FUMARATE DIHYDRATE 2 PUFF: 80; 4.5 AEROSOL RESPIRATORY (INHALATION) at 06:00

## 2020-01-01 RX ADMIN — MORPHINE SULFATE 4 MG: 4 INJECTION INTRAVENOUS at 23:13

## 2020-01-01 RX ADMIN — FUROSEMIDE 40 MG: 10 INJECTION, SOLUTION INTRAMUSCULAR; INTRAVENOUS at 02:42

## 2020-01-01 RX ADMIN — PANTOPRAZOLE SODIUM 40 MG: 40 INJECTION, POWDER, LYOPHILIZED, FOR SOLUTION INTRAVENOUS at 06:16

## 2020-01-01 RX ADMIN — TAMSULOSIN HYDROCHLORIDE 0.4 MG: 0.4 CAPSULE ORAL at 06:46

## 2020-01-01 RX ADMIN — CEFTRIAXONE SODIUM 2 G: 2 INJECTION, POWDER, FOR SOLUTION INTRAMUSCULAR; INTRAVENOUS at 00:41

## 2020-01-01 RX ADMIN — METRONIDAZOLE 500 MG: 500 INJECTION, SOLUTION INTRAVENOUS at 21:04

## 2020-01-01 RX ADMIN — MORPHINE SULFATE 4 MG: 4 INJECTION INTRAVENOUS at 08:44

## 2020-01-01 RX ADMIN — MORPHINE SULFATE 4 MG: 4 INJECTION INTRAVENOUS at 04:45

## 2020-01-01 SDOH — HEALTH STABILITY: MENTAL HEALTH: HOW OFTEN DO YOU HAVE A DRINK CONTAINING ALCOHOL?: NEVER

## 2020-01-01 ASSESSMENT — ENCOUNTER SYMPTOMS
NERVOUS/ANXIOUS: 0
FEVER: 0
SORE THROAT: 0
FOCAL WEAKNESS: 0
DIZZINESS: 1
SEIZURES: 0
SENSORY CHANGE: 0
EYE DISCHARGE: 0
VOMITING: 0
WHEEZING: 0
FEVER: 0
TREMORS: 0
BRUISES/BLEEDS EASILY: 0
BACK PAIN: 0
WEAKNESS: 1
TINGLING: 0
POLYDIPSIA: 0
TREMORS: 0
HEARTBURN: 0
MYALGIAS: 1
BRUISES/BLEEDS EASILY: 1
PHOTOPHOBIA: 0
WHEEZING: 1
FEVER: 0
COUGH: 1
MEMORY LOSS: 0
BLURRED VISION: 0
DOUBLE VISION: 0
HEMOPTYSIS: 0
NAUSEA: 0
MEMORY LOSS: 0
BLOOD IN STOOL: 1
SPUTUM PRODUCTION: 0
BLURRED VISION: 0
SPUTUM PRODUCTION: 0
NERVOUS/ANXIOUS: 1
LOSS OF CONSCIOUSNESS: 0
ORTHOPNEA: 0
ABDOMINAL PAIN: 1
NECK PAIN: 0
FLANK PAIN: 0
PALPITATIONS: 0
BACK PAIN: 0
HEARTBURN: 0
WEIGHT LOSS: 1
DIAPHORESIS: 0
VOMITING: 1
CONSTIPATION: 1
TREMORS: 0
CHILLS: 0
FEVER: 1
ABDOMINAL PAIN: 0
SHORTNESS OF BREATH: 0
PALPITATIONS: 0
LOSS OF CONSCIOUSNESS: 0
CONSTIPATION: 1
POLYDIPSIA: 0
BLURRED VISION: 0
BLOOD IN STOOL: 1
DIARRHEA: 1
MYALGIAS: 0
COUGH: 0
SENSORY CHANGE: 0
PND: 0
FLANK PAIN: 0
CONSTIPATION: 0
HEARTBURN: 0
TINGLING: 0
INSOMNIA: 0
SORE THROAT: 0
WHEEZING: 0
SPUTUM PRODUCTION: 1
MYALGIAS: 0
DIAPHORESIS: 0
COUGH: 0
ORTHOPNEA: 0
WEAKNESS: 0
NERVOUS/ANXIOUS: 0
ABDOMINAL PAIN: 1
DIARRHEA: 0
DIZZINESS: 0
ORTHOPNEA: 1
SINUS PAIN: 0
NAUSEA: 1
BLOOD IN STOOL: 0
FALLS: 0
LOSS OF CONSCIOUSNESS: 0
VOMITING: 0
PHOTOPHOBIA: 0
FLANK PAIN: 0
VOMITING: 0
SPUTUM PRODUCTION: 1
POLYDIPSIA: 0
EYE PAIN: 0
DEPRESSION: 0
EYES NEGATIVE: 1
HEARTBURN: 1
INSOMNIA: 0
ORTHOPNEA: 1
CHILLS: 0
VOMITING: 1
HEADACHES: 0
COUGH: 1
WEAKNESS: 1
PND: 0
CLAUDICATION: 0
ABDOMINAL PAIN: 0
DIAPHORESIS: 0
SORE THROAT: 0
CHILLS: 0
DIZZINESS: 0
NERVOUS/ANXIOUS: 0
DIARRHEA: 0
NAUSEA: 0
PALPITATIONS: 0
PHOTOPHOBIA: 0
SPUTUM PRODUCTION: 0
MYALGIAS: 1
SINUS PAIN: 0
PSYCHIATRIC NEGATIVE: 1
SPEECH CHANGE: 0
COUGH: 0
WHEEZING: 0
SEIZURES: 0
DIZZINESS: 0
INSOMNIA: 0
SHORTNESS OF BREATH: 0
SPEECH CHANGE: 0
NECK PAIN: 0
TINGLING: 0
PND: 0
SINUS PAIN: 0
NAUSEA: 0
WEAKNESS: 0
CLAUDICATION: 0
EYE DISCHARGE: 0
HEADACHES: 0
HEADACHES: 0
HEMOPTYSIS: 0
NECK PAIN: 0
CLAUDICATION: 0
MYALGIAS: 0
BACK PAIN: 0
EYE DISCHARGE: 0
SHORTNESS OF BREATH: 1
FOCAL WEAKNESS: 0
NAUSEA: 1
WEAKNESS: 1
DOUBLE VISION: 0
DIARRHEA: 0
WEIGHT LOSS: 1
SENSORY CHANGE: 0
FOCAL WEAKNESS: 0
DOUBLE VISION: 0
HEMOPTYSIS: 0
MEMORY LOSS: 0
BLOOD IN STOOL: 0
SEIZURES: 0
WEIGHT LOSS: 1
SPEECH CHANGE: 0
SHORTNESS OF BREATH: 1

## 2020-01-01 ASSESSMENT — COPD QUESTIONNAIRES
DO YOU EVER COUGH UP ANY MUCUS OR PHLEGM?: NO/ONLY WITH OCCASIONAL COLDS OR INFECTIONS
DURING THE PAST 4 WEEKS HOW MUCH DID YOU FEEL SHORT OF BREATH: NONE/LITTLE OF THE TIME
COPD SCREENING SCORE: 2
HAVE YOU SMOKED AT LEAST 100 CIGARETTES IN YOUR ENTIRE LIFE: NO/DON'T KNOW

## 2020-01-01 ASSESSMENT — LIFESTYLE VARIABLES
SUBSTANCE_ABUSE: 0
DO YOU DRINK ALCOHOL: NO
EVER_SMOKED: NEVER
DOES PATIENT WANT TO STOP DRINKING: NO

## 2020-01-01 ASSESSMENT — PAIN SCALES - WONG BAKER: WONGBAKER_NUMERICALRESPONSE: HURTS A WHOLE LOT

## 2020-01-14 NOTE — PATIENT COMMUNICATION
Please call patient and let patient know in order to request for a wheelchair durable medical equipment you will need to make an appointment to see me for this referral and please find out information before hand and before this appointment from your insurance [employer] or any other insurance you have what type of specific wheelchair you would want and would qualify for and which company that they will take and talk to the company what paperwork and information they will need and please bring this to your appointment and so please schedule an  appointment as soon as you have availability and I have availability and we will fit you in for this appointment for your referral for your wheelchair.  This referral appointment will need to be a separate appointment as it will require some time for the paperwork and a separate appointment from your lab follow-up appointment.  Thanks.

## 2020-01-15 NOTE — LETTER
[employer] [facility]  Ubaldo Mayfield M.D.  [address]  [place] [address]  Fax: [phone]   Authorization for Release/Disclosure of   Protected Health Information   Name: SHAWANDA DALEY JR. : 1935 SSN: [id number]   Address: [address] [place] [address] Phone:    [phone] (home)    I authorize the entity listed below to release/disclose the PHI below to:   [facility]/Ubaldo Mayfield M.D. and Ubaldo Mayfield M.D.   Provider or Entity Name:  [facility]   Address   City, [facility], [facility]   Phone:      Fax:  [phone]   Reason for request: continuity of care   Information to be released:    [  ] LAST COLONOSCOPY,  including any PATH REPORT and follow-up  [  ] LAST FIT/COLOGUARD RESULT [  ] LAST DEXA  [  ] LAST MAMMOGRAM  [  ] LAST PAP  [  ] LAST LABS [  ] RETINA EXAM REPORT  [  ] IMMUNIZATION RECORDS  [XXX] Release all info      [  ] Check here and initial the line next to each item to release ALL health information INCLUDING  _____ Care and treatment for drug and / or alcohol abuse  _____ HIV testing, infection status, or AIDS  _____ Genetic Testing    DATES OF SERVICE OR TIME PERIOD TO BE DISCLOSED: _____________  I understand and acknowledge that:  * This Authorization may be revoked at any time by you in writing, except if your health information has already been used or disclosed.  * Your health information that will be used or disclosed as a result of you signing this authorization could be re-disclosed by the recipient. If this occurs, your re-disclosed health information may no longer be protected by State or Federal laws.  * You may refuse to sign this Authorization. Your refusal will not affect your ability to obtain treatment.  * This Authorization becomes effective upon signing and will  on (date) __________.      If no date is indicated, this Authorization will  one (1) year from the signature date.    Name: Shawanda Daley Jr.    Signature: CONTINUITY OF CARE   Date:          1/16/2020       PLEASE FAX REQUESTED RECORDS BACK TO: [phone]

## 2020-01-15 NOTE — TELEPHONE ENCOUNTER
Future Appointments       Provider Department Center    1/21/2020 2:10 PM Ubaldo Mayfield M.D.; [facility]        ANNUAL WELLNESS VISIT PRE-VISIT PLANNING WITHOUT OUTREACH    1.  Reviewed note from last office visit with PCP: YES    2.  If any orders were placed at last visit, do we have Results/Consult Notes?        •  Labs - Labs ordered, completed on 12/31/2019 and results are in chart.       •  Imaging - Imaging was not ordered at last office visit.       •  Referrals - Referral ordered, patient was seen and consult notes were requested from [facility]. Care Teams updated  YES.    3.  Immunizations were updated in On[employer] using WebIZ?: Yes       •  WebIZ Recommendations: TD, SHINGRIX (Shingles) and CPOX        •  Is patient due for Tdap? NO       •  Is patient due for Shingrix? YES. Patient was not notified of copay/out of pocket cost.     4.  Patient is due for the following Health Maintenance Topics:   There are no preventive care reminders to display for this patient.    5.  Reviewed/Updated the following with patient:       •   Preferred Pharmacy? YES       •   Preferred Lab? YES       •   Preferred Communication? YES       •   Allergies? YES       •   Medications? YES. Was Abstract Encounter opened and chart updated? YES       •   Social History? YES. Was Abstract Encounter opened and chart updated? YES       •   Family History (document living status of immediate family members and if + hx of  cancer, diabetes, hypertension, hyperlipidemia, heart attack, stroke) YES. Was Abstract Encounter opened and chart updated? YES    6.  Care Team Updated:       •   DME Company (gait device, O2, CPAP, etc.): YES- [employer]       •   Other Specialists (eye doctor, derm, GYN, cardiology, endo, etc): YES    7. Orders for overdue Health Maintenance topics pended in Pre-Charting? NO    8.  Patient has the following Care Path diagnoses on Problem List:  DEPRESSION     9.  Patient was  advised: “This is a free wellness visit. The provider will screen for medical conditions to help you stay healthy. If you have other concerns to address you may be asked to discuss these at a separate visit or there may be an additional fee.”     10.  Patient was informed to arrive 15 min prior to their scheduled appointment and bring in their medication bottles.

## 2020-01-20 NOTE — TELEPHONE ENCOUNTER
----- Message from Ubaldo Mayfield M.D. sent at 1/19/2020  4:12 PM PST -----  Please call patient and let patient know that his H. Pylori test is positive and please make sure patient is following up with Gastroenterology to have this treated and ensure that he has a follow up with them so make sure this treatment is done through gastroentrology, thank you.

## 2020-01-21 NOTE — TELEPHONE ENCOUNTER
Talked with his wife Esther about the patient and he was supposed to see me today for annual wellness visit however he is not feeling well and she does report that he has been sleeping more and not eating well and considering wanting to talk to me about hospice.  She also reports that he was in the ER admitted for 2 days recently for allergic reaction and was having some difficulty breathing and they gave him some Symbicort and this is helped with his breathing and she was wondering until he can come in to see me and reschedule if he could have a refill of this medication so I will authorize a one-time refill and encouraged him to make an appointment to come and see me so we could check out his vitals and see how he is doing and if he needs additional resources or referrals or any other further medication management.  [facility]ill sent to [id number] [place] [facility] and as patient could not come here today I did talk with his wife Esther who helps with healthcare to encourage him to make an appointment or if he is having any acute issues such as shortness of breath or chest pain to go to the ER or call 911.

## 2020-01-22 NOTE — PATIENT COMMUNICATION
Please call patient let the patient know that I will also give 1 more refill for the albuterol inhalation rescue inhaler but if you need any more refills or if you would like to discuss your current situation or need for home health or any other type of care as you did miss your appointment today please schedule a follow-up appointment as they will be leaving [facility] with my last appointment February 27 so please make an appointment to see me before I leave February 27 to discuss any more refills and also any other additional care or referrals we may need at this appointment in person.  I will authorize 1 refill of your Symbicort which I already sent and 1 of your albuterol and if you need any more please make an appointment.  Thanks.

## 2020-01-22 NOTE — TELEPHONE ENCOUNTER
Was the patient seen in the last year in this department? Yes    Does patient have an active prescription for medications requested? No     Received Request Via: Pharmacy      Pt met protocol?: Yes    OV 12/19

## 2020-01-23 PROBLEM — D64.9 ANEMIA: Status: ACTIVE | Noted: 2020-01-01

## 2020-01-23 PROBLEM — N17.9 ACUTE ON CHRONIC RENAL FAILURE (HCC): Status: ACTIVE | Noted: 2020-01-01

## 2020-01-23 PROBLEM — R18.8 OTHER ASCITES: Status: ACTIVE | Noted: 2020-01-01

## 2020-01-23 PROBLEM — I50.9 ACUTE DECOMPENSATED HEART FAILURE (HCC): Status: ACTIVE | Noted: 2020-01-01

## 2020-01-23 PROBLEM — R31.9 HEMATURIA: Status: ACTIVE | Noted: 2020-01-01

## 2020-01-23 PROBLEM — R60.1 ANASARCA: Status: ACTIVE | Noted: 2020-01-01

## 2020-01-23 PROBLEM — K92.1 MELANOTIC STOOLS: Status: ACTIVE | Noted: 2020-01-01

## 2020-01-23 PROBLEM — K92.2 GI BLEED: Status: ACTIVE | Noted: 2020-01-01

## 2020-01-23 PROBLEM — N18.9 ACUTE ON CHRONIC RENAL FAILURE (HCC): Status: ACTIVE | Noted: 2020-01-01

## 2020-01-23 PROBLEM — D61.818 PANCYTOPENIA (HCC): Status: ACTIVE | Noted: 2020-01-01

## 2020-01-23 PROBLEM — D68.9 COAGULOPATHY (HCC): Status: ACTIVE | Noted: 2020-01-01

## 2020-01-23 PROBLEM — R10.32 LEFT LOWER QUADRANT ABDOMINAL PAIN: Status: ACTIVE | Noted: 2020-01-01

## 2020-01-23 PROBLEM — J90 BILATERAL PLEURAL EFFUSION: Status: ACTIVE | Noted: 2020-01-01

## 2020-01-23 NOTE — ED NOTES
RN with ICU resident at bedside for rectal pt turned on side desated to 85% rolled back and became unresponsive to sternal rub x 30 sec weak palpable carotid pulse-- EKG call for at bedside.  EKG showed junctional rhythm. Resident paged his superior.

## 2020-01-23 NOTE — CONSULTS
DATE OF SERVICE:  2020    HEMATOLOGY/ONCOLOGY CONSULTATION    ADMITTING PHYSICIAN:  Elena Escalante MD    REQUESTING PHYSICIAN:  Elena Escalante MD    CONSULTING PHYSICIAN:  Sabine Bates MD    REASON FOR CONSULTATION:  Profound cytopenia.    HISTORY OF PRESENT ILLNESS:  The patient is an 84-year-old male with multiple   medical problems, chronic kidney disease, aortic stenosis, pulmonary   hypertension, benign prostate hypertrophy, renal stones, presented to the   [facility] with progressive weakness, black colored stools   and a 30-40 pound weight loss.  According to patient, he has been having   these symptoms on and off for the last couple of months, progressively got   worse and he was unable to care for himself at home.  He was brought into the   ER and was found to have WBC count of 3.9, hemoglobin 7.8, platelet count of   26,000.  Patient denies any fevers, chills or night sweats.  He has been   feeling dizzy and lightheaded for the last couple of days.  No chest pain or   palpitations.  Also, his creatinine level has increased from baseline of   2.0-4.2.    Patient does mention that he and his wife live together and he was able to do activities of daily living until recently.     PAST MEDICAL HISTORY:  BPH.    PAST SURGICAL HISTORY:  Hernia repair, benign breast tumor removed, anal cyst   removed.    ALLERGIES:  No known drug allergies.    MEDICATIONS:  Currently, he is on ceftriaxone, Flagyl, pantoprazole, Flomax,   Zofran, Dulcolax, milk of magnesia.    SOCIAL HISTORY:  Patient denies history of smoking.  No heavy alcohol intake.    No drugs.  He is , lives with his wife.  He does not have any   children.      FAMILY HISTORY:  Both parents  of heart conditions.      REVIEW OF SYSTEMS:  I did do a 10-point system review, which is negative   except as mentioned above.    CONSTITUTIONAL:  Positive fatigue, positive weight loss, positive generalized    weakness.  No fevers, chills or night sweats.  RESPIRATORY:  Positive dyspnea on exertion.  No cough.  CARDIOVASCULAR:  No chest pain or palpitations.  GASTROINTESTINAL:  No abdominal pain, nausea or vomiting, but he has been   having black-colored stools on and off for the last couple of months.  PSYCHIATRIC:  Positive situational anxiety, no depression.  NEUROLOGY:  No headaches, no focal weakness or seizure activity.  ENT.  Positive epistaxis.    PHYSICAL EXAMINATION:  GENERAL:  He is alert and oriented x3, thin appearing, cachectic looking.    VITAL SIGNS:  Temperature was 36.3, pulse was 60, respiratory rate 15, blood   pressure 101/55, and on 2 liters saturating about 95%.    HEENT:  Pupils are equal, reactive to light.  Extraocular muscles are intact.      CHEST:  Clear to auscultation and bilaterally symmetrical.    CARDIOVASCULAR:  S1, S2 heard, regular rate and rhythm.    ABDOMEN:  Soft, nontender and nondistended.  Positive bowel sounds.  No   hernia.  EXTREMITIES:  No edema bilaterally, no cyanosis or clubbing.  PSYCHIATRY:  Mood appropriate and normal affect.    LABORATORY DATA:  WBC count is 3.9, hemoglobin 7.8, MCV is 93.3, platelet   count is 26,000.  Sodium 138, potassium 4.0, BUN is 79, creatinine 3.98,   calcium 7.4, AST 8, ALT less than 5, alkaline phosphatase is 52, total   bilirubin is 0.5, albumin is 2.7.      ASSESSMENT AND PLAN:  The patient is an 84-year-old male with multiple   comorbidities, presented to the hospital with complaints of black-colored   stools, epistaxis and generalized weakness with a 40-50 pound weight loss.  He   was found to be profoundly anemic and thrombocytopenic, etiology unknown.    - Thrombocytopenia : Ordered DIC labs, reviewed peripheral smear no schistocytes, Bili WNL. Unlikely intravascular hemolysis . I did request to do LDH, Ret count, haptoglobin, KAYLEE.   - Anemia : likely due to GI bleed with history of melena for last 2-3 months . Ordered Iron studies,  ferritin , B12, folic acid. GI consult obtained by primary team   - Acute on CRF - likely pre-renal due to bleed, decreased po intake. Nephrology consult recommended.   - Once acute issues stabilized, he will need a bone marrow biopsy to rule   out primary bone marrow condition at his age myelodysplastic syndrome.  I will   continue to follow with inpatient team.    Thank you for allowing me to participate in his care, please call if any questions arise  - We will continue to follow inpatient team        ____________________________________     Sabine Bates MD SR / NTS    DD:  01/23/2020 09:35:56  DT:  01/23/2020 11:53:56    D#:  [id number]  Job#:  [id number]

## 2020-01-23 NOTE — ED NOTES
Pt verified Resident physician discussed blood product administration with him. RN reviewed Consent for Blood transfusion --pt had no questions and signed consent

## 2020-01-23 NOTE — ASSESSMENT & PLAN NOTE
DDx include ATN, intrinsic vs ?obstruciton   US renal with borderline hydronephrosis but no obvious obstruction noted in CT or US.   Monitor UOP closely  Nephrology was consulted for concern of uremia causing platelet dysfunction

## 2020-01-23 NOTE — ED NOTES
Mamadou from Lab called with critical result of platelets 27 at 2328. Critical lab result read back to Mamadou.   Dr. Mott notified of critical lab result at 2327.  Critical lab result read back by Dr. Mott.

## 2020-01-23 NOTE — ED NOTES
Med Rec completed per patient's home pharmacy ([facility])   Allergies reviewed  No ORAL antibiotics in last 14 days

## 2020-01-23 NOTE — PROCEDURES
Procedure Note    Date: 1/23/2020  Time: 10:30am    Procedure: Central Venous Line placement  Site: Left IJ vein    Indication: Hemodynamic Instability     Consent: Informed consent obtained from patient or designated decision maker after explaining the benefits/risks of the procedure including but not limited to bleeding, infection, nerve or other deep structure injury, pneumothorax/hemothorax, arrythmia, or death. Patient or surrogate expressed understanding and agreement.    Procedure: After obtaining consent, a time-out was performed. Appropriate site confirmed with ultrasound and patient positioned, prepped, and draped in sterile fashion. All those present wearing cap and mask and those physically participating remained adhering to sterile fashion with cap, mask, gloves, and gown.     Approximately 5 mL of local anesthetic injected (1% lidocaine without epinephrine) achieving appropriate comfort level for patient. Vein localized and accessed using continuous ultrasound guidance and a 7 Fr triple catheter placed using Seldinger technique. Able to aspirate dark, non-pulsatile blood through each lumen and sterile saline flushed easily before capping. Line secured and dressed in sterile fashion. Patient tolerated procedure well without any difficulties and remains in care of bedside nurse. CXR will be performed to confirm appropriate placement for internal jugular or subclavian CVLs.    EBL: minimal  Complications: None  CXR: Confirmed placement without complication/pneunothorax    Cipriano Franco MD  Critical Care Medicine

## 2020-01-23 NOTE — ASSESSMENT & PLAN NOTE
Profoundly elevated BNP of 24,000 at [facility] w/ worsening shortness of breath, orthopnea, bilateral pleural effusions, and anasarca  Last TTE in 07/2019 demonstrated LVEF of 65% w/o major structural defects  Diffuse coronary calcifications noted on CT chest  No prior documented hx of cardiomyopathy, cardiac ischemic, ACS, etc  Volume overloaded state peripherally due to poor pump function  Had been on :  - One time dose of Lasix 40mg administered in ED   - TTE was ordered, f/u  - Could consider consulting cardiology  - Follow up EKG  Then went COMFORT CARE.  - discontinued unnecessary treatments.

## 2020-01-23 NOTE — ASSESSMENT & PLAN NOTE
Cardiorenal? Pre-renal? Likely cardiorenal. Baseline Cr 2.5-3.2. Serum creatinine >4.0 on presentation. Administered 40mg IV Lasix in ED.  Had been on :  - Follow up urine electrolytes  - Fox catheter in place  - Strict I/Os  - Likely pre-renal due to bleed, decreased po intake. Nephrology consult recommended per heme/onc  Then went COMFORT CARE.  - discontinued unnecessary treatments.

## 2020-01-23 NOTE — ASSESSMENT & PLAN NOTE
Several month hx of post-prandial abdominal pain and dark tarry stools  No masses noted on CT abd/pelvis concerning for malignancy  Extensive arterial calcifications noted throughout visceral vasculature  Chronic mesenteric ischemia? Possible upper GI bleed? Peptic ulcer disease?   Unclear etiology  Had been on :  - Protonix 40mg BID   - Discuss consulting GI for EGD/Colonoscopy  Then went COMFORT CARE.  - discontinued unnecessary treatments.

## 2020-01-23 NOTE — PROGRESS NOTES
Received ED to ED transfer request from [facility]  Sending Physician: Dr. Daugherty.  Specialist consulted: N/A - requires GI specialty which is not available at sending facility.  Diagnosis GI bleed; Thrombocytopenia.  Patient Accepted/Not Accepted by: ERP

## 2020-01-23 NOTE — PROGRESS NOTES
[facility] ICU Progress Note      Admit Date: 1/22/2020  ICU Day: 1    Resident(s): Elena Escalante M.D.  Attending: ARIEL THOMAS/ Dr. Cipriano Franco    Date & Time:   1/23/2020   4:34 PM       Patient ID:    Name:             Shawanda Daley     YOB: 1935  Age:                 84 y.o.  male   MRN:               [id number]    HPI:    84 year old male with past medical history of gastritis, H.pylori , CKD, mild aortic stenosis, pulmonary hypertension, BPH, renal stones who was transferred from [facility] for concerns of acute decompensated heart failure and GI bleed. Patient had presented with complaints of progressive generalized weakness, shortness of breath and abdominal pain. Patient also reported ongoing tarry stools for the last couple of months. Workup in [place] significant for glucose of 44 , hemoglobin 9 hematocrit 29.4 platelets 27 INR 1.2 BUN 85 creatinine 4.4 BNP 24,000 TSH 8.2 occult blood positive.  EKG sinus rhythm with rate of 64.  First degree AV block.  Patient given D50 and transferred to [facility] ED. In the ER, chest X ray showed right> left pleural effusion vs. Possible pneumonia, he was given a dose of 40 mg IV lasix with subsequent gross hematuria. Repeat hemoglobin dropped to 7.8. 1 unit PRBC stat was ordered and pending at the time of my exam. Patient was noted to be progressively hypotensive and desaturating to the 80s with any slight movement. Also hypoglycemic to the 50s. While in the ER, he was also noted to have an episode of syncope. TEG ordered, patient made NPO, GI and heme/onc consulted.     ICU team was consulted for patient's worsening hemodynamic status, hemoglobin dropped from 9-7.9, Hemoccult positive, new onset gross hematuria, pancytopenia platelets 26, worsening AK I on CKD and worsening shortness of breath.  Central line was placed for hemodynamic instability, and blood pressure support and left internal jugular vein.  Initial team had consulted hematology  and gastroenterology for their recommendations. Transfer orders were placed to ICU for higher level of care.     Interval Events:  -Central line placed for hemodynamic instability  -Continue protonix, octredotide  -For renal failure- continue LR IVFs at 75cc/hr  -For hematuria, consulted urology, Dr Macdonald, to see patient, will f/u recommendations  -For presumed upper GI bleeding/melanotic stools, GI consulted for potential EGD/colonoscopy   - Continue ceftriaxone and metronidazole  -Concern for bleeding from multiple sites, consulted heme/onc, ?DIC vs TTP vs ITP vs malignant process  - Per Heme/Onc recs- workup of thrombocytopenia- Ordered DIC labs, reviewed peripheral smear no schistocytes, Bili WNL. Unlikely intravascular hemolysis , LDH, Ret count, haptoglobin, KAYLEE ordered. Anemia : likely due to GI bleed with history of melena for last 2-3 months. Iron studies, ferritin , B12, folic acid ordered. Bone marrow biopsy to rule out primary bone marrow condition at his age myelodysplastic syndrome.  - Bone biopsy procedure 1/23/20  - Follow-up GI recommendations, NPO at this time        Consultants:  ICU: Dr Cipriano Franco  GI  Heme/Onc  Urology      Review of Systems   Constitutional: Positive for malaise/fatigue and weight loss (3-40 lbs in past year). Negative for chills, diaphoresis and fever.   HENT: Positive for nosebleeds. Negative for congestion, ear discharge, ear pain, hearing loss, sinus pain, sore throat and tinnitus.    Eyes: Negative for blurred vision, double vision, photophobia and discharge.   Respiratory: Positive for cough, sputum production and shortness of breath. Negative for hemoptysis and wheezing.    Cardiovascular: Positive for orthopnea and leg swelling. Negative for chest pain, palpitations, claudication and PND.   Gastrointestinal: Positive for abdominal pain, blood in stool, constipation, melena, nausea and vomiting. Negative for diarrhea and heartburn.   Genitourinary: Positive for  "hematuria. Negative for dysuria, flank pain, frequency and urgency.   Musculoskeletal: Negative for back pain, joint pain, myalgias and neck pain.   Skin: Negative for itching and rash.   Neurological: Positive for weakness. Negative for dizziness, tingling, tremors, sensory change, speech change, focal weakness, seizures, loss of consciousness and headaches.   Endo/Heme/Allergies: Negative for environmental allergies and polydipsia.   Psychiatric/Behavioral: Negative for memory loss. The patient is not nervous/anxious and does not have insomnia.    All other systems reviewed and are negative.      PHYSICAL EXAM    Vitals:    01/23/20 1521 01/23/20 1526 01/23/20 1531 01/23/20 1602   BP: 156/98 149/87 153/86 142/85   Pulse: 93 95 94 96   Resp: (!) 22 15 19 (!) 23   Temp:    (!) 35.4 °C (95.7 °F)   TempSrc:       SpO2: 100% 98% 100% 100%   Weight:       Height:         Body mass index is 22.96 kg/m².  /85   Pulse 96   Temp (!) 35.4 °C (95.7 °F)   Resp (!) 23   Ht 1.778 m (5' 10\")   Wt 72.6 kg (160 lb)   SpO2 100%   BMI 22.96 kg/m²   O2 therapy: Pulse Oximetry: 100 %, O2 (LPM): 4, O2 Delivery: Nasal Cannula    Physical Exam   Constitutional: He appears distressed.   HENT:   Head: Normocephalic and atraumatic.   Right Ear: External ear normal.   Left Ear: External ear normal.   Eyes: Pupils are equal, round, and reactive to light. EOM are normal. Right eye exhibits no discharge. Left eye exhibits no discharge. No scleral icterus.   Neck: Normal range of motion. Neck supple. No JVD present. No tracheal deviation present. No thyromegaly present.   Cardiovascular: Normal rate, regular rhythm and intact distal pulses. Exam reveals no gallop and no friction rub.   Murmur (diastolic murmer ) heard.  Pulmonary/Chest: No stridor. He is in respiratory distress. He has rales. He exhibits no tenderness.   Crackling in bilateral lung fields, diminished breathing sounds   Abdominal: He exhibits distension. He exhibits " no mass. There is tenderness. There is no rebound and no guarding.   Musculoskeletal: Normal range of motion.         General: Edema (B/l LLE edema 2+ up to knees) present. No tenderness or deformity.   Skin: No rash noted. He is not diaphoretic. No pallor.   Psychiatric: Mood, memory, affect and judgment normal.   Nursing note and vitals reviewed.      Respiratory:     Respiration: (!) 23, Pulse Oximetry: 100 %, O2 Daily Delivery Respiratory : Room Air with O2 Available    Chest Tube Drains:  None        HemoDynamics:  Pulse: 96 Blood Pressure : 142/85      Neuro:  A&Ox4    Fluids:     No intake or output data in the 24 hours ending 20 1247    Weight: 72.6 kg (160 lb)  Body mass index is 22.96 kg/m².    Recent Labs     20  1114   SODIUM 136 138 137   POTASSIUM 4.7 4.4 4.6   CHLORIDE 110 112 111   CO2 14* 15* 15*   BUN 84* 79* 82*   CREATININE 4.24* 3.98* 4.18*   CALCIUM 7.8* 7.4* 7.4*       GI/Nutrition:  Recent Labs     20  0423 20  1114   ALTSGPT <5  --  <5   ASTSGOT 8*  --  8*   ALKPHOSPHAT 52  --  49   TBILIRUBIN 0.5  --  1.2   LIPASE 13  --   --    GLUCOSE 111* 59* 79       Heme:  Recent Labs     20  0138 20  0405 20  1114   RBC 2.92* 2.68*  --  2.93*   HEMOGLOBIN 8.5* 7.8* 7.9* 8.6*   HEMATOCRIT 26.6* 25.0* 24.6* 26.5*   PLATELETCT 27* 26*  --  25*   PROTHROMBTM  --   --   --  17.0*   APTT  --   --   --  47.5*   INR  --   --   --  1.35*   IRON  --   --   --  121   FERRITIN  --   --   --  166.2   TOTIRONBC  --   --   --  125*       Infectious Disease:  Temp  Av.9 °C (96.7 °F)  Min: 35.4 °C (95.7 °F)  Max: 36.5 °C (97.7 °F)  Recent Labs     20  2230 20  0138 20  1114   WBC 3.5* 3.9* 3.6*   NEUTSPOLYS 72.30* 67.70 75.00*   LYMPHOCYTES 19.30* 21.70* 15.60*   MONOCYTES 6.90 9.00 7.50   EOSINOPHILS 0.60 0.80 0.80   BASOPHILS 0.00 0.00 0.30   ASTSGOT 8*  --  8*   ALTSGPT <5  --  <5    ALKPHOSPHAT 52  --  49   TBILIRUBIN 0.5  --  1.2       Meds:  • senna-docusate  2 Tab      And   • polyethylene glycol/lytes  1 Packet      And   • magnesium hydroxide  30 mL      And   • bisacodyl  10 mg     • Respiratory Care per Protocol       • ondansetron  4 mg     • ondansetron  4 mg     • pantoprazole  40 mg     • albuterol  2 Puff     • budesonide-formoterol  2 Puff     • diphenhydrAMINE  50 mg     • tamsulosin  0.4 mg     • glucose  16 g      And   • dextrose 10% bolus  250 mL     • cefTRIAXone (ROCEPHIN) IV  1 g     • metroNIDAZOLE (FLAGYL) IV  500 mg Stopped (01/23/20 0845)   • LR   75 mL/hr at 01/23/20 0845   • NS   30 mL/hr at 01/23/20 1613   • NORepinephrine (LEVOPHED) infusion  0-30 mcg/min 15 mcg/min (01/23/20 1506)        Procedures:  Central line- 1/23/20    Imaging:  DX-CHEST-FOR LINE PLACEMENT Perform procedure in: Patient's Room   Final Result      1.  Interval insertion of central venous catheter which terminates with the tip projecting over the expected region of the mid to distal superior vena cava.   2.  Stable enlargement of the cardiomediastinal silhouette.   3.  Stable right pleural effusion.   4.  Stable bilateral basilar atelectasis and/or consolidation. Underlying infection is possible.      EC-ECHOCARDIOGRAM COMPLETE W/O CONT   Final Result      US-RENAL   Final Result      Medium-large volume ascites and pleural fluid      Echogenic material in the bladder surrounds the Fox catheter, likely indicating hemorrhage      Borderline hydronephrosis      Echogenic renal cortices, compatible with medical renal disease      CT-ABDOMEN-PELVIS W/O   Final Result      1.  New anasarca with medium-large pleural effusions, ascites and diffuse fat stranding making detection of inflammation insensitive      2.  Ascites is low density suggesting it is not hemorrhagic      3.  Stable long segmental distal colonic wall thickening without bowel obstruction. This suggests chronic inflammation of  infectious favored over inflammatory causes.      4.  4.1 cm ascending aortic aneurysm      5.  Severe coronary artery disease, aortic valve calcification indicating stenosis, left hepatic cyst and/or hemangioma and remote granulomatous disease as before      DX-CHEST-PORTABLE (1 VIEW)   Final Result      Enlarging right greater than left pleural effusions, worsening basilar atelectasis. Consolidation not excluded          Problem and Plan:    * Melanotic stools  Assessment & Plan  Several month hx of post-prandial abdominal pain and dark tarry stools  No masses noted on CT abd/pelvis concerning for malignancy  Extensive arterial calcifications noted throughout visceral vasculature  Chronic mesenteric ischemia? Possible upper GI bleed? Peptic ulcer disease?   Unclear etiology  Plan:  - Protonix 40mg BID   - Discuss consulting GI for EGD/Colonoscopy    GI bleed  Assessment & Plan  ICU team was consulted for patient's worsening hemodynamic status, hemoglobin dropped from 9-7.9, Hemoccult positive, new onset gross hematuria, pancytopenia platelets 26, worsening AK I on CKD and worsening shortness of breath.  Central line was placed for hemodynamic instability, and blood pressure support and left internal jugular vein.  Initial team had consulted hematology and gastroenterology for their recommendations. Transfer orders were placed to ICU for higher level of care.    Plan  - Protonix 40mg BID   - Oceteotride  -For presumed upper GI bleeding/melanotic stools, GI consulted for potential EGD/colonoscopy     Hematuria  Assessment & Plan  - Nephrology consulted for new onset gross hematuria, will appreciate recommendations, at this time will continue to monitor for adequate flow in sousa.    Pancytopenia (HCC)  Assessment & Plan  Pancytopenic w/ leukopenia, anemia, and thrombocytopenia  Myeloproliferative disease? MGUS? Multiple myeloma?  Unclear etiology  Thrombocytopenic w/ bleeding from gastrointestinal tract, nose, and  genitourinary tract  Anemic w/ drop in Hemoglobin <8.0  Ordered for 1u pRBC   Plan:  - Transfuse pRBCs for Hgb<8.0  - Trend H/H q6hrs  - Discuss consulting hematology for bone marrow biopsy  - Discuss transfusing plts <30,000 given active bleeds  - Discuss consulting palliative care  - Follow up SPEP  - Follow up complement levels      Acute on chronic renal failure (HCC)  Assessment & Plan  Cardiorenal? Pre-renal? Likely cardiorenal. Baseline Cr 2.5-3.2. Serum creatinine >4.0 on presentation. Administered 40mg IV Lasix in ED.    Plan:  - Follow up urine electrolytes  - Fox catheter in place  - Strict I/Os  - Likely pre-renal due to bleed, decreased po intake. Nephrology consult recommended per heme/onc    Acute decompensated heart failure (HCC)  Assessment & Plan  Profoundly elevated BNP of 24,000 at [facility] w/ worsening shortness of breath, orthopnea, bilateral pleural effusions, and anasarca  Last TTE in 07/2019 demonstrated LVEF of 65% w/o major structural defects  Diffuse coronary calcifications noted on CT chest  No prior documented hx of cardiomyopathy, cardiac ischemic, ACS, etc  Volume overloaded state peripherally due to poor pump function  Plan:  - One time dose of Lasix 40mg administered in ED   - TTE was ordered, f/u  - Discuss consulting cardiology  - Follow up EKG    Bilateral pleural effusion  Assessment & Plan  Large bilateral pleural effusions, transudative in appearance on CT  Likely secondary to pump failure in setting of elevated BNP.  Chest X ray showed right> left pleural effusion vs. Possible pneumonia    Plan:  - Hold diuresis at this time given volume depleted clinical picture  - Will need thoracentesis for pleural effusions  - Empiric abx- ceftriaxone and metronidazole (colitis/diverticulitis)  - Continuous pulse oximetry    Left lower quadrant abdominal pain  Assessment & Plan  LLQ pain, distended abd on exam. CT abd/pelvis- stable long segmental distal colonic  wall thickening without bowel obstruction, suggests chronic inflammation of infectious favored over inflammatory causes.    - Empiric abx- ceftriaxone and metronidazole (colitis/diverticulitis)  - CTM for worsening distention, bowel movements/bowel regime    Anasarca  Assessment & Plan  Bilateral pleural effusions w/ ascites w/ bilateral lower extremity pitting edema  Serum albumin 2.7, not that low  Liver function WNL  Likely secondary to pump failure  Plan:  - Administered 40mg of Lasix in ED  - Hold diuresis at this time given volume depletion clinical picture  - Tap abdomen/chest as needed for therapeutic/diagnostic purposes      DISPO: ICU    CODE STATUS: DNR/DNI    Quality Measures:  Fox Catheter:  Yes  DVT Prophylaxis: SCDs  Ulcer Prophylaxis: Protonix  Antibiotics: Ceftriaxone and Metronidazole  Lines: Left IJ central line, 7 french triple lumen

## 2020-01-23 NOTE — CONSULTS
"Critical Care Consultation    Date of consult: 1/23/2020    Referring Physician  Swapnil Us M.D.    Reason for Consultation  Bleeding    History of Presenting Illness  84 y.o. male with hx of CKD (baseline cr 1.7), CAD, COPD, who presented from [facility] for progressive weakness and anemia, 30-40lb weight loss, melena, \"pain all over\" At ED, pt was found to have Hgb 8.5 which was a drop from 11 two weeks ago.  Pt is alert, oriented. MAP >65, SBP in 90s, HR in 60s, afebrile with Tm 36.3.  WBC 3.5, plt 27K, PMN 70% and 20% lymph.   Lactate 1.2, procal 0.83  Creatinine 4.24 and noted hematuria in the sousa bag.   CT A/P noted large right side pleural effusion and moderate size of ascites. + stable colonic wall thickening  Pt started on ceftriaxone and azithromcyin     Pt has no known hx of cirrhosis, non drinker, non smoker. He's a retired  and lives with wife. Pt denies any trauma.     TTE with EF 65%, mild LVH, dilated RV and good function. Moderate AS, TR, mild MR  Small pericardial effusion without evidence of diastolic collapse.   US kidney with borderline hydronephrosis.     Code Status  DNAR/DNI    Review of Systems  Review of Systems   Constitutional: Negative for chills, fever and malaise/fatigue.   HENT: Negative for congestion and sinus pain.    Respiratory: Negative for cough, sputum production and shortness of breath.    Cardiovascular: Negative for chest pain, palpitations, orthopnea and leg swelling.   Gastrointestinal: Negative for abdominal pain, diarrhea, nausea and vomiting.   Genitourinary: Negative for dysuria and urgency.   Musculoskeletal: Negative for back pain, falls and joint pain.   Skin: Negative for rash.   Neurological: Negative for seizures, weakness and headaches.   Psychiatric/Behavioral: The patient is not nervous/anxious.    All other systems reviewed and are negative.      Past Medical History   has a past medical history of Aortic stenosis, Bowel habit " changes (10/21/2019), BPH (benign prostatic hyperplasia) (3/4/2016), Breath shortness, Cataract, Chronic fatigue, Edema of both feet (10/21/2019), Hernia of abdominal wall, History of anemia, Indigestion, Loss of appetite, Low serum vitamin B12 (3/15/2019), Pulmonary hypertension (HCC), Renal disorder (2015), Renal disorder (10/21/2019), Stomach pain (10/21/2019), and Urinary incontinence. He also has no past medical history of Arrhythmia, Asthma, Blood transfusion without reported diagnosis, Cancer (HCC), CHF (congestive heart failure) (HCC), Clotting disorder (HCC), COPD (chronic obstructive pulmonary disease) (HCC), Diabetes (HCC), GERD (gastroesophageal reflux disease), Heart attack (HCC), Hypertension, IBD (inflammatory bowel disease), Migraine, Seizure (HCC), Stroke (HCC), Substance abuse (HCC), or Thyroid disease.    Surgical History   has a past surgical history that includes inguinal hernia repair robotic (Bilateral, 8/10/2016); breast biopsy (Left, 11/18/2016); cyst excision (2000); colonoscopy (2013); colonoscopy (2018); endoscopy-esoph/stomach (10/02/2019); gastroscopy-endo (10/24/2019); egd w/endoscopic ultrasound (10/24/2019); and egd with asp/bx (10/24/2019).    Family History  family history includes Alzheimer's Disease in his sister; Cancer in his brother and maternal uncle; Colon Cancer in his father; Heart Disease in his father; Other in his brother and mother; Stroke (age of onset: 86) in his father.    Social History   reports that he has never smoked. He has never used smokeless tobacco. He reports previous alcohol use. He reports that he does not use drugs.    Medications  Home Medications     Reviewed by Georgina Cowart (Pharmacy Tech) on 01/23/20 at 0917  Med List Status: Complete   Medication Last Dose Status   albuterol 108 (90 Base) MCG/ACT Aero Soln inhalation aerosol PRN Active   bismuth subsalicylate (PEPTO-BISMOL) 262 MG Chew Tab PRN Active   budesonide-formoterol (SYMBICORT)  80-4.5 MCG/ACT Aerosol 1/22/2020 Active   gabapentin (NEURONTIN) 100 MG Cap 1/22/2020 Active   nystatin-triamcinalone (MYCOLOG) 930973-3.1 UNIT/GM-% Ointment PRN Active   omeprazole (PRILOSEC) 20 MG delayed-release capsule 1/22/2020 Active   ondansetron (ZOFRAN ODT) 4 MG TABLET DISPERSIBLE PRN Active   tamsulosin (FLOMAX) 0.4 MG capsule 1/22/2020 Active              Current Facility-Administered Medications   Medication Dose Route Frequency Provider Last Rate Last Dose   • senna-docusate (PERICOLACE or SENOKOT S) 8.6-50 MG per tablet 2 Tab  2 Tab Oral BID Kevin Dick M.D.   2 Tab at 01/23/20 0646    And   • polyethylene glycol/lytes (MIRALAX) PACKET 1 Packet  1 Packet Oral QDAY PRN Kevin Dick M.D.        And   • magnesium hydroxide (MILK OF MAGNESIA) suspension 30 mL  30 mL Oral QDAY PRN Kevin Dick M.D.        And   • bisacodyl (DULCOLAX) suppository 10 mg  10 mg Rectal QDAY PRN Kevin Dick M.D.       • Respiratory Care per Protocol   Nebulization Continuous RT Kevin Dick M.D.       • ondansetron (ZOFRAN) syringe/vial injection 4 mg  4 mg Intravenous Q4HRS PRN Kevin Dick M.D.       • ondansetron (ZOFRAN ODT) dispertab 4 mg  4 mg Oral Q4HRS PRN Kevin Dick M.D.       • pantoprazole (PROTONIX) injection 40 mg  40 mg Intravenous BID Kevin Dick M.D.   40 mg at 01/23/20 0928   • albuterol inhaler 2 Puff  2 Puff Inhalation Q4HRS PRN Kevin Dick M.D.       • budesonide-formoterol (SYMBICORT) 80-4.5 MCG/ACT inhaler 2 Puff  2 Puff Inhalation BID Kevin Dick M.D.   2 Puff at 01/23/20 0600   • diphenhydrAMINE (BENADRYL) tablet/capsule 50 mg  50 mg Oral QHS PRN Kevin Dick M.D.       • tamsulosin (FLOMAX) capsule 0.4 mg  0.4 mg Oral DAILY Kevin Dick M.D.   0.4 mg at 01/23/20 0646   • glucose 4 g chewable tablet 16 g  16 g Oral Q15 MIN PRN Debo Wade M.D.        And   • DEXTROSE 10% BOLUS 250 mL  250 mL Intravenous Q15 MIN PRN Debo  ALOK Wade M.D.       • cefTRIAXone (ROCEPHIN) 1 g in  mL IVPB  1 g Intravenous QHS Debo Wade M.D.       • metroNIDAZOLE (FLAGYL) IVPB 500 mg  500 mg Intravenous Q8HRS Debo Wade M.D.   Stopped at 01/23/20 0845   • lactated ringers infusion   Intravenous Continuous Elena Escalante M.D. 75 mL/hr at 01/23/20 0845     • NS infusion   Intravenous Continuous Cipriano Franco D.O.         Current Outpatient Medications   Medication Sig Dispense Refill   • bismuth subsalicylate (PEPTO-BISMOL) 262 MG Chew Tab Take 524 mg by mouth 4 times a day as needed.     • nystatin-triamcinalone (MYCOLOG) 327795-7.1 UNIT/GM-% Ointment Apply 1 Application to affected area(s) 2 times a day as needed.     • budesonide-formoterol (SYMBICORT) 80-4.5 MCG/ACT Aerosol Inhale 2 Puffs by mouth 2 Times a Day. 1 Inhaler 0   • albuterol 108 (90 Base) MCG/ACT Aero Soln inhalation aerosol Inhale 2 Puffs by mouth every four hours as needed. 8.5 g 0   • omeprazole (PRILOSEC) 20 MG delayed-release capsule Take 1 Cap by mouth 2 times a day for 42 days. 84 Cap 0   • ondansetron (ZOFRAN ODT) 4 MG TABLET DISPERSIBLE Take 1 Tab by mouth every 6 hours as needed for Nausea. 30 Tab 0   • gabapentin (NEURONTIN) 100 MG Cap Take 1 Cap by mouth every evening. 90 Cap 3   • tamsulosin (FLOMAX) 0.4 MG capsule Take 0.4 mg by mouth 2 Times a Day.         Allergies  No Known Allergies    Vital Signs last 24 hours  Temp:  [36.3 °C (97.4 °F)-36.5 °C (97.7 °F)] 36.3 °C (97.4 °F)  Pulse:  [58-69] 58  Resp:  [15-23] 18  BP: ()/(50-69) 99/50  SpO2:  [92 %-100 %] 100 %    Physical Exam  Physical Exam  Vitals signs and nursing note reviewed.   Constitutional:       General: He is not in acute distress.     Appearance: He is not toxic-appearing or diaphoretic.      Comments: cachectic   HENT:      Head: Normocephalic and atraumatic.      Mouth/Throat:      Mouth: Mucous membranes are moist.      Pharynx: Oropharynx is clear.   Neck:      Musculoskeletal: Neck  supple.   Cardiovascular:      Rate and Rhythm: Bradycardia present. Rhythm irregular.      Pulses: Normal pulses.      Heart sounds: Normal heart sounds. No murmur.   Pulmonary:      Effort: Pulmonary effort is normal. No respiratory distress.      Breath sounds: Normal breath sounds. No wheezing, rhonchi or rales.      Comments: Diminished in right side at base.   Abdominal:      General: There is distension.      Palpations: Abdomen is soft. There is no mass.      Tenderness: There is tenderness.      Hernia: No hernia is present.      Comments: Tender to palpate mainly in epigastric and also lower mid abdomen.    Musculoskeletal:         General: No swelling or tenderness.   Skin:     General: Skin is warm and dry.      Coloration: Skin is not jaundiced.   Neurological:      General: No focal deficit present.      Mental Status: He is alert and oriented to person, place, and time.      Cranial Nerves: No cranial nerve deficit.   Psychiatric:         Mood and Affect: Mood normal.         Behavior: Behavior normal.         Fluids  No intake or output data in the 24 hours ending 01/23/20 1106    Laboratory  Recent Results (from the past 48 hour(s))   ABO Rh Confirm    Collection Time: 01/22/20  1:38 AM   Result Value Ref Range    ABO Rh Confirm O POS    COD - Adult (Type and Screen)    Collection Time: 01/22/20 10:29 PM   Result Value Ref Range    ABO Grouping Only O     Rh Grouping Only POS     Antibody Screen-Cod NEG     Component R       R3                  Red Blood Cells3    [id number]   issued       01/23/20   06:43      Product Type Red Blood Cells LR Pheresis     Dispense Status issued     Unit Number (Barcoded) [id number]     Product Code (Barcoded) A2498V68     Blood Type (Barcoded) 5100    CBC WITH DIFFERENTIAL    Collection Time: 01/22/20 10:30 PM   Result Value Ref Range    WBC 3.5 (L) 4.8 - 10.8 K/uL    RBC 2.92 (L) 4.70 - 6.10 M/uL    Hemoglobin 8.5 (L) 14.0 - 18.0 g/dL    Hematocrit 26.6 (L)  42.0 - 52.0 %    MCV 91.1 81.4 - 97.8 fL    MCH 29.1 27.0 - 33.0 pg    MCHC 32.0 (L) 33.7 - 35.3 g/dL    RDW 48.7 35.9 - 50.0 fL    Platelet Count 27 (LL) 164 - 446 K/uL    MPV 12.9 9.0 - 12.9 fL    Neutrophils-Polys 72.30 (H) 44.00 - 72.00 %    Lymphocytes 19.30 (L) 22.00 - 41.00 %    Monocytes 6.90 0.00 - 13.40 %    Eosinophils 0.60 0.00 - 6.90 %    Basophils 0.00 0.00 - 1.80 %    Immature Granulocytes 0.90 0.00 - 0.90 %    Nucleated RBC 0.00 /100 WBC    Neutrophils (Absolute) 2.52 1.82 - 7.42 K/uL    Lymphs (Absolute) 0.67 (L) 1.00 - 4.80 K/uL    Monos (Absolute) 0.24 0.00 - 0.85 K/uL    Eos (Absolute) 0.02 0.00 - 0.51 K/uL    Baso (Absolute) 0.00 0.00 - 0.12 K/uL    Immature Granulocytes (abs) 0.03 0.00 - 0.11 K/uL    NRBC (Absolute) 0.00 K/uL   CMP    Collection Time: 01/22/20 10:30 PM   Result Value Ref Range    Sodium 136 135 - 145 mmol/L    Potassium 4.7 3.6 - 5.5 mmol/L    Chloride 110 96 - 112 mmol/L    Co2 14 (L) 20 - 33 mmol/L    Anion Gap 12.0 (H) 0.0 - 11.9    Glucose 111 (H) 65 - 99 mg/dL    Bun 84 (HH) 8 - 22 mg/dL    Creatinine 4.24 (H) 0.50 - 1.40 mg/dL    Calcium 7.8 (L) 8.5 - 10.5 mg/dL    AST(SGOT) 8 (L) 12 - 45 U/L    ALT(SGPT) <5 2 - 50 U/L    Alkaline Phosphatase 52 30 - 99 U/L    Total Bilirubin 0.5 0.1 - 1.5 mg/dL    Albumin 2.7 (L) 3.2 - 4.9 g/dL    Total Protein 5.1 (L) 6.0 - 8.2 g/dL    Globulin 2.4 1.9 - 3.5 g/dL    A-G Ratio 1.1 g/dL   LIPASE    Collection Time: 01/22/20 10:30 PM   Result Value Ref Range    Lipase 13 11 - 82 U/L   DIAGNOSTIC ALCOHOL    Collection Time: 01/22/20 10:30 PM   Result Value Ref Range    Diagnostic Alcohol 0.00 0.00 g/dL   ESTIMATED GFR    Collection Time: 01/22/20 10:30 PM   Result Value Ref Range    GFR If  16 (A) >60 mL/min/1.73 m 2    GFR If Non  13 (A) >60 mL/min/1.73 m 2   PERIPHERAL SMEAR REVIEW    Collection Time: 01/22/20 10:30 PM   Result Value Ref Range    Peripheral Smear Review see below    PLATELET ESTIMATE     Collection Time: 01/22/20 10:30 PM   Result Value Ref Range    Plt Estimation Marked Decrease    IMMATURE PLT FRACTION    Collection Time: 01/22/20 10:30 PM   Result Value Ref Range    Imm. Plt Fraction 6.9 0.6 - 13.1 K/uL   DIFFERENTIAL COMMENT    Collection Time: 01/22/20 10:30 PM   Result Value Ref Range    Comments-Diff see below    proBrain Natriuretic Peptide, NT    Collection Time: 01/22/20 10:30 PM   Result Value Ref Range    NT-proBNP 48257 (H) 0 - 125 pg/mL   VITAMIN B12    Collection Time: 01/22/20 10:30 PM   Result Value Ref Range    Vitamin B12 -True Cobalamin >1500 (H) 211 - 911 pg/mL   FOLATE    Collection Time: 01/22/20 10:30 PM   Result Value Ref Range    Folate -Folic Acid 6.4 >4.0 ng/mL   RETICULOCYTES COUNT    Collection Time: 01/22/20 10:30 PM   Result Value Ref Range    Reticulocyte Count 3.0 (H) 0.8 - 2.1 %    Retic, Absolute 0.09 (H) 0.04 - 0.06 M/uL    Imm. Reticulocyte Fraction 21.7 (H) 9.3 - 17.4 %    Retic Hgb Equivalent 31.1 29.0 - 35.0 pg/cell   LACTIC ACID    Collection Time: 01/23/20  1:38 AM   Result Value Ref Range    Lactic Acid 1.2 0.5 - 2.0 mmol/L   CBC WITH DIFFERENTIAL    Collection Time: 01/23/20  1:38 AM   Result Value Ref Range    WBC 3.9 (L) 4.8 - 10.8 K/uL    RBC 2.68 (L) 4.70 - 6.10 M/uL    Hemoglobin 7.8 (L) 14.0 - 18.0 g/dL    Hematocrit 25.0 (L) 42.0 - 52.0 %    MCV 93.3 81.4 - 97.8 fL    MCH 29.1 27.0 - 33.0 pg    MCHC 31.2 (L) 33.7 - 35.3 g/dL    RDW 49.8 35.9 - 50.0 fL    Platelet Count 26 (LL) 164 - 446 K/uL    MPV 12.7 9.0 - 12.9 fL    Neutrophils-Polys 67.70 44.00 - 72.00 %    Lymphocytes 21.70 (L) 22.00 - 41.00 %    Monocytes 9.00 0.00 - 13.40 %    Eosinophils 0.80 0.00 - 6.90 %    Basophils 0.00 0.00 - 1.80 %    Immature Granulocytes 0.80 0.00 - 0.90 %    Nucleated RBC 0.00 /100 WBC    Neutrophils (Absolute) 2.65 1.82 - 7.42 K/uL    Lymphs (Absolute) 0.85 (L) 1.00 - 4.80 K/uL    Monos (Absolute) 0.35 0.00 - 0.85 K/uL    Eos (Absolute) 0.03 0.00 - 0.51 K/uL     Baso (Absolute) 0.00 0.00 - 0.12 K/uL    Immature Granulocytes (abs) 0.03 0.00 - 0.11 K/uL    NRBC (Absolute) 0.00 K/uL   OSMOLALITY URINE    Collection Time: 20  3:11 AM   Result Value Ref Range    Osmolality Urine 363 300 - 900 mOsm/kg H2O   URINE SODIUM RANDOM    Collection Time: 20  3:11 AM   Result Value Ref Range    Sodium, Urine -per volume 56 mmol/L   URINE CREATININE RANDOM    Collection Time: 20  3:11 AM   Result Value Ref Range    Creatinine, Random Urine 88.30 mg/dL   HEMOGLOBIN AND HEMATOCRIT    Collection Time: 20  4:05 AM   Result Value Ref Range    Hemoglobin 7.9 (L) 14.0 - 18.0 g/dL    Hematocrit 24.6 (L) 42.0 - 52.0 %   Basic Metabolic Panel    Collection Time: 20  4:23 AM   Result Value Ref Range    Sodium 138 135 - 145 mmol/L    Potassium 4.4 3.6 - 5.5 mmol/L    Chloride 112 96 - 112 mmol/L    Co2 15 (L) 20 - 33 mmol/L    Glucose 59 (L) 65 - 99 mg/dL    Bun 79 (HH) 8 - 22 mg/dL    Creatinine 3.98 (H) 0.50 - 1.40 mg/dL    Calcium 7.4 (L) 8.5 - 10.5 mg/dL    Anion Gap 11.0 0.0 - 11.9   ESTIMATED GFR    Collection Time: 20  4:23 AM   Result Value Ref Range    GFR If  18 (A) >60 mL/min/1.73 m 2    GFR If Non  14 (A) >60 mL/min/1.73 m 2   ACCU-CHEK GLUCOSE    Collection Time: 20  5:54 AM   Result Value Ref Range    Glucose - Accu-Ck 54 (L) 65 - 99 mg/dL   EKG    Collection Time: 20  5:58 AM   Result Value Ref Range    Report       [facility]    Test Date:  2020  Pt Name:    WILLIAM SANTACRUZ           Department: ER  MRN:        [id number]                      Room:       [facility]  Gender:     Male                         Technician: [id number]  :        1935                   Requested By:DONAVAN MEZA  Order #:    [id number]                    Reading MD:    Measurements  Intervals                                Axis  Rate:       61                           P:          0  GA:          244                          QRS:        48  QRSD:       112                          T:          67  QT:         476  QTc:        480    Interpretive Statements  SINUS RHYTHM  FIRST DEGREE AV BLOCK  NONSPECIFIC INTRAVENTRICULAR CONDUCTION DELAY  LOW VOLTAGE IN FRONTAL LEADS  Compared to ECG 2016 12:28:27  Intraventricular conduction delay now present  Low QRS voltage now present  Sinus bradycardia no longer present  Atrial premature complex(es) no longer present  Left-axis devia tion no longer present  T-wave abnormality no longer present     ACCU-CHEK GLUCOSE    Collection Time: 20  6:45 AM   Result Value Ref Range    Glucose - Accu-Ck 120 (H) 65 - 99 mg/dL   BASIC TEG W HEPARINASE    Collection Time: 20  6:56 AM   Result Value Ref Range    React Time Initial Hep 7.5 5.0 - 10.0 min    Clot Kinetics Heparinase 8.0 (H) 1.0 - 3.0 min    Clot Angle Heparinase 31.6 (L) 53.0 - 72.0 degrees    Max Clot Heparinase 34.1 (L) 50.0 - 70.0 mm    Lysis 30 min Heparinase 0.0 0.0 - 8.0 %    TEG Algorithm Link Algorithm    PROCALCITONIN    Collection Time: 20  6:56 AM   Result Value Ref Range    Procalcitonin 0.83 (H) <0.25 ng/mL   BASIC TEG    Collection Time: 20  6:56 AM   Result Value Ref Range    Reaction Time Initial-R 8.6 5.0 - 10.0 min    Clot Kinetics-K 12.2 (H) 1.0 - 3.0 min    Clot Angle-Angle 23.4 (L) 53.0 - 72.0 degrees    Maximum Clot Strength-MA 29.4 (L) 50.0 - 70.0 mm    Lysis 30 minutes-LY30 0.0 0.0 - 8.0 %   EKG    Collection Time: 20  7:22 AM   Result Value Ref Range    Report       [facility] Emergency Dept.    Test Date:  2020  Pt Name:    WILLIAM SANTACRUZ           Department: ER  MRN:        [id number]                      Room:       [facility]  Gender:     Male                         Technician: ALONDRA  :        1935                   Requested By:TAYLOR REYNA  Order #:    [id number]                    Reading  MD:    Measurements  Intervals                                Axis  Rate:       70                           P:  MS:                                      QRS:        58  QRSD:       108                          T:          89  QT:         456  QTc:        493    Interpretive Statements  ACCELERATED JUNCTIONAL RHYTHM  BORDERLINE INTRAVENTRICULAR CONDUCTION DELAY  LOW VOLTAGE IN FRONTAL LEADS  BORDERLINE R WAVE PROGRESSION, ANTERIOR LEADS  BORDERLINE PROLONGED QT INTERVAL  Compared to ECG 01/23/2020 05:58:05  Accelerated junctional rhythm now present  Sinus rhythm no longer present  First degree AV block no longer present      ACCU-CHEK GLUCOSE    Collection Time: 01/23/20  7:33 AM   Result Value Ref Range    Glucose - Accu-Ck 108 (H) 65 - 99 mg/dL   EC-ECHOCARDIOGRAM COMPLETE W/O CONT    Collection Time: 01/23/20 10:42 AM   Result Value Ref Range    Eject.Frac. MOD 4C 66.7     Left Ventrical Ejection Fraction 65        Imaging  EC-ECHOCARDIOGRAM COMPLETE W/O CONT   Final Result      US-RENAL   Final Result      Medium-large volume ascites and pleural fluid      Echogenic material in the bladder surrounds the Fox catheter, likely indicating hemorrhage      Borderline hydronephrosis      Echogenic renal cortices, compatible with medical renal disease      CT-ABDOMEN-PELVIS W/O   Final Result      1.  New anasarca with medium-large pleural effusions, ascites and diffuse fat stranding making detection of inflammation insensitive      2.  Ascites is low density suggesting it is not hemorrhagic      3.  Stable long segmental distal colonic wall thickening without bowel obstruction. This suggests chronic inflammation of infectious favored over inflammatory causes.      4.  4.1 cm ascending aortic aneurysm      5.  Severe coronary artery disease, aortic valve calcification indicating stenosis, left hepatic cyst and/or hemangioma and remote granulomatous disease as before      DX-CHEST-PORTABLE (1 VIEW)   Final Result     "  Enlarging right greater than left pleural effusions, worsening basilar atelectasis. Consolidation not excluded      DX-CHEST-PORTABLE (1 VIEW)    (Results Pending)   DX-CHEST-FOR LINE PLACEMENT Perform procedure in: Patient's Room    (Results Pending)       Assessment/Plan  * Melanotic stools  Assessment & Plan  On protonix 40 BID.   Close monitoring of CBC  NG tube output without any evidence of upper GI bleed. No coffee ground or blood. Bilious output    GI bleed  Assessment & Plan  UGI given hx of melena and \"hematemesis\"  GI consulted for need of EGD  Protonix 40 BID  Pt s/p 1U PRBC.   CBC Q4H  Transfuse if Hgb <8, plt <50k, or evidence of abnormal TEG  NG tube was placed and no coffee ground emesis. Only bilious    Coagulopathy (HCC)  Assessment & Plan  TEG with MA 29.   Will transfuse 2U platelet.   CBC Q4H  Transfusion parameter: Hgb <8, platelet <50, fibrinogen <200, INR >2    Hematuria  Assessment & Plan  Presumed due to his severe coagulapathy, rather than anatomical. CT A/P didn't show any abnormality  Will consult Urology.     Pancytopenia (HCC)  Assessment & Plan  Pancytopenia - query any underlying hematological diseaes  Hematology has been consulted and pt to have bone marrow biopsy.       Acute on chronic renal failure (HCC)  Assessment & Plan  DDx include ATN, intrinsic vs ?obstruciton   US renal with borderline hydronephrosis but no obvious obstruction noted in CT or US.   Monitor UOP closely    Bilateral pleural effusion  Assessment & Plan  Will do thoracentesis given large right pleural effusion once coagulation is corrected.   Plan tomorrow. Will give 2U Platelets and cryo today  Send pleural fluid for analysis. LDH, protein, cell count, pH, routine gram stain/cx.       Other ascites  Assessment & Plan  Given ascites, suspect some underlying liver  Check acute hepatitis panel, alcohol level  Will tap ascites when coagulopathy is corrected and send for gs/culture, albumin, cell count and " cytology       Discussed patient condition and risk of morbidity and/or mortality with RN, RT, [employer] [facility] resident and Patient.    The patient remains critically ill.  Critical care time = 90 minutes in directly providing and coordinating critical care and extensive data review.  No time overlap and excludes procedures.

## 2020-01-23 NOTE — ASSESSMENT & PLAN NOTE
Pancytopenic w/ leukopenia, anemia, and thrombocytopenia  Myeloproliferative disease? MGUS? Multiple myeloma?  Unclear etiology  Thrombocytopenic w/ bleeding from gastrointestinal tract, nose, and genitourinary tract  Anemic w/ drop in Hemoglobin <8.0  Ordered for 1u pRBC   Had been on :   - Transfuse pRBCs for Hgb<8.0  - Trend H/H q6hrs  - Discuss consulting hematology for bone marrow biopsy  - Discuss transfusing plts <30,000 given active bleeds  - Discuss consulting palliative care  - SPEP with M-spike in Gamma region, with IgG type Kappa monoclonal protein, and additional band of IgA Lambda.   - H/O had done bone biopsy (1/23/20).   Then went COMFORT CARE.  - discontinued unnecessary treatments.

## 2020-01-23 NOTE — ASSESSMENT & PLAN NOTE
Bilateral pleural effusions w/ ascites w/ bilateral lower extremity pitting edema  Serum albumin 2.7, not that low  Liver function WNL  Likely secondary to pump failure  Had been on :  - Administered 40mg of Lasix in ED  - Hold diuresis at this time given volume depletion clinical picture  - Tap abdomen/chest as needed for therapeutic/diagnostic purposes  - Not witnessed on exam on 1/24/20.  Then went COMFORT CARE.  - discontinued unnecessary treatments.

## 2020-01-23 NOTE — ASSESSMENT & PLAN NOTE
Large bilateral pleural effusions, transudative in appearance on CT  Likely secondary to pump failure in setting of elevated BNP.  Chest X ray showed right> left pleural effusion vs. Possible pneumonia  Had been on :  - Hold diuresis at this time given volume depleted clinical picture  - Will need thoracentesis for pleural effusions  - Empiric abx- ceftriaxone and metronidazole (colitis/diverticulitis)  - Continuous pulse oximetry  Then went COMFORT CARE.  - discontinued unnecessary treatments.

## 2020-01-23 NOTE — ASSESSMENT & PLAN NOTE
Pancytopenia - query any underlying hematological diseaes  Hematology has been consulted and pt to have bone marrow biopsy.

## 2020-01-23 NOTE — ED NOTES
Bedside handoff report from ORLANDO Brown. ICU DrSheng At bedside. Stat EKG complete. Pt resting comfortably. Blood products running. Pt AOx4.

## 2020-01-23 NOTE — PROCEDURES
Bone Marrow Biopsy/Aspiration  Date/Time: 1/23/2020 12:50 PM  Performed by: Shy Herrera M.D.  Authorized by: Shy Herrera M.D.     Consent:     Consent obtained:  Written    Consent given by:  Patient    Risks discussed:  Bleeding, infection and pain    Alternatives discussed:  No treatment  Universal protocol:     Procedure explained and questions answered to patient or proxy's satisfaction: yes      Relevant documents present and verified: yes      Test results available and properly labeled: yes      Imaging studies available: yes      Required blood products, implants, devices, and special equipment available: yes      Immediately prior to procedure a time out was called: yes      Site/side marked: yes      Patient identity confirmed:  Verbally with patient  Pre-procedure details:     Procedure type:  Aspiration and biopsy    Requesting physician:  Jana    Indications:  Pancytopenia, 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    Position:  Prone    Buttock laterality:  Left    Local anesthetic:  1% Lidocaine    Subcutaneous volume:  1 mL    Periosteum anesthetic volume:  3 mL    Preparation: Patient was prepped and draped in usual sterile fashion    Sedation:     Patient Sedated: Yes      Sedation type: moderate (conscious) sedation      Sedation:  Midazolam    Analgesia:  Fentanyl    Sedation length:  45 minutes (1mg versed, 25mcg fentanyl; 1250-->1335)  Procedure details:     Aspirate obtained:  5 mL followed by 5 mL    Blood cultures collected:  None    Number of attempts:  4+    Estimated blood loss (mL):  7  Post-procedure:     Puncture site:  Adhesive bandage applied and direct pressure applied    Patient tolerance of procedure:  Tolerated with difficulty  Comments:      Got aspirate after 5 attempts; but unable after multiple more attempts to grab any core as his bone is quite friable and soft

## 2020-01-23 NOTE — H&P
Internal Medicine Admitting History and Physical    Note Author: Kevin Dick M.D.       Name Shawanda Daley 1935   Age/Sex 84 y.o. male   MRN [id number]   Code Status DNR/DNI     After 5PM or if no immediate response to page, please call for cross-coverage  Attending/Team: Abeba/Negro See Patient List for primary contact information  Call [phone] to page    1st Call - Day Intern (R1):   Negro 2nd Call - Day Sr. Resident (R2/R3):   Mauro       Chief Complaint:   Profound malaise/fatigue  Worsening shortness of breath  Melanotic stools  Post-prandial pain leading to anorexia  30-40lb unintentional weight loss     HPI:  85y/o emaciated and very frail elderly gentleman transferred to [facility] from [facility] w/ concern for acute decompensated heart failure, acute on chronic kidney injury, heme-positive stools, thrombocytopenia, and critical symptomatic hypoglycemia.     Shawanda Daley has a medical hx of chronic kidney disease and COPD not requiring home oxygen. His last TTE done on 2019 demonstrated a LVEF of 65% w/o any severe structural or valvular disease.     On evaluation in the [facility] ED, the patient described multiple complaints across multiple organ systems:  1. Profound malaise and fatigue to the point of becoming bedridden  2. Tarry melanotic stools daily   3. Large-volume epistaxis daily  4. Worsening shortness of breath and orthopnea for the past 10 days  5. Worsening bilateral lower extremity edema  6. Substantial postprandial abdominal pain leading to anorexia w/ 30-40lb unintentional weight loss  7. Substantial drop in urine output    Laboratory investigations ensued in the ED demonstrated pancytopenia w/ a WBC of 3.5, H/H of 8.5/26.6, and plt count of 27.0. Lactate was non-elevated. The patient was hypoglycemic at [facility] w/ a serum glucose of 44 and received an ampule of D50 prior to transfer. Repeat BMP at [facility] demonstrated a glucose  of 111. Serum creatinine was elevated from baseline of 2.8-3.2 to 4.2. Guaiac was positive. BNP at [facility] was profoundly elevated to 24,000. Computed tomography of the chest/abd/pelvis demonstrated severe coronary calcifications as well as diffuse systemic arterial calcification. Bilateral large to moderate sized transudative pleural effusions were noted on CT as well as significant ascites/anasarca. The patient's mucous membranes were very dry however he was volume overloaded peripherally due to acute decompensated heart failure. He was administered 40mg of Lasix in the ED to which he responded w/ 100-150cc of bloody urine output. Follow up H/H demonstrated a drop in Hgb below 8.0 and the patient was ordered for one unit of pRBCs STAT.     Review of Systems   Constitutional: Positive for malaise/fatigue and weight loss. Negative for chills, diaphoresis and fever.   HENT: Positive for nosebleeds. Negative for congestion and sore throat.    Eyes: Negative for blurred vision and double vision.   Respiratory: Positive for shortness of breath and wheezing. Negative for hemoptysis and sputum production.    Cardiovascular: Positive for orthopnea and leg swelling. Negative for chest pain, palpitations and claudication.   Gastrointestinal: Positive for abdominal pain and melena. Negative for nausea and vomiting.   Genitourinary: Positive for hematuria. Negative for dysuria and flank pain.   Musculoskeletal: Negative for back pain and falls.   Skin: Negative for itching and rash.   Neurological: Positive for dizziness. Negative for focal weakness, loss of consciousness and headaches.   Endo/Heme/Allergies: Negative for polydipsia. Bruises/bleeds easily.   Psychiatric/Behavioral: Negative for depression. The patient is not nervous/anxious.          Past Medical History (Chronic medical problem, known complications and current treatment)    Past Medical History:   Diagnosis Date   • Aortic stenosis     mild per  "cardiology note. Followed by [facility] cardiology.   • Bowel habit changes 10/21/2019    constipation & diarrhea   • BPH (benign prostatic hyperplasia) 3/4/2016   • Breath shortness     10/21/19-started 2yrs ago but has worsened in past 4 weeks. Increases with exertion.    • Cataract     fabiola lens surgery   • Chronic fatigue    • Edema of both feet 10/21/2019    and legs with redness. Denies open sores   • Hernia of abdominal wall     10/21/19-above umbilicus and pt states above previous hernia mesh & was told fatty and to push back in prn.    • History of anemia    • Indigestion     no meds   • Loss of appetite    • Low serum vitamin B12 3/15/2019   • Pulmonary hypertension (HCC)     followed by [facility] cardiology   • Renal disorder 2015    kidney stone.    • Renal disorder 10/21/2019    \"weak kidney\".  Elevated creatinine on labs   • Stomach pain 10/21/2019   • Urinary incontinence      Past Surgical History:  Past Surgical History:   Procedure Laterality Date   • GASTROSCOPY-ENDO  10/24/2019    Procedure: GASTROSCOPY- POSSIBLE BIOPSY, DILATION, POLYPECTOMY, CONTROL OF HEMORRHAGE;  Surgeon: Ephraim Medrano M.D.;  Location: [facility];  Service: Gastroenterology   • EGD W/ENDOSCOPIC ULTRASOUND  10/24/2019    Procedure: EGD, WITH ENDOSCOPIC US;  Surgeon: Ephraim Medrano M.D.;  Location: [facility];  Service: Gastroenterology   • EGD WITH ASP/BX  10/24/2019    Procedure: EGD, WITH ASPIRATION BIOPSY- FNA;  Surgeon: Ephraim Medrano M.D.;  Location: [facility];  Service: Gastroenterology   • ENDOSCOPY-ESOPH/STOMACH  10/02/2019   • COLONOSCOPY  2018   • BREAST BIOPSY Left 11/18/2016    Procedure: BREAST BIOPSY;  Surgeon: Adrianne Dobbins M.D.;  Location: [facility];  Service:    • INGUINAL HERNIA REPAIR ROBOTIC Bilateral 8/10/2016    Procedure: INGUINAL HERNIA REPAIR ROBOTIC with mesh  ;  Surgeon: Francis Fletcher M.D.;  Location: [facility];  " "Service:    • COLONOSCOPY     • CYST EXCISION      anal cyst     Current Outpatient Medications:  Home Medications    **Home medications have not yet been reviewed for this encounter**       Medication Allergy/Sensitivities:  No Known Allergies    Family History (mandatory)   Family History   Problem Relation Age of Onset   • Other Mother         old age   • Heart Disease Father    • Stroke Father 86         from stroke   • Colon Cancer Father         colon cancer   • Cancer Maternal Uncle         prostate   • Alzheimer's Disease Sister    • Cancer Brother         LUNG CANCER   • Other Brother         on a blood thinner medication     Social History (mandatory)   Social History     Socioeconomic History   • Marital status:    Tobacco Use   • Smoking status: Never Smoker   • Smokeless tobacco: Never Used   Substance and Sexual Activity   • Alcohol use: Not Currently     Alcohol/week: 0.0 oz     Frequency: Never     Binge frequency: Never   • Drug use: No   • Sexual activity: Never     Partners: Female   Social History Narrative    Retired .  Lives with his wife.  He is using a walker but not having any falls or head trauma.  No memory concerns.  He is having some hearing concerns.  He was hospitalized in 2019.     Living situation: Lives w/ wife  PCP : Ubaldo Mayfield M.D.    Physical Exam     Vitals:    20 2153 20 2159 20 0402   BP:  119/69 (!) 94/52   Pulse:  66 62   Resp:  20 18   Temp:  36.5 °C (97.7 °F)    TempSrc:  Oral    SpO2:  94% 92%   Weight: 72.6 kg (160 lb)     Height: 1.778 m (5' 10\")       Body mass index is 22.96 kg/m².  O2 therapy: Pulse Oximetry: 92 %, O2 Delivery: None (Room Air)    Physical Exam   Constitutional: He is oriented to person, place, and time.   Very frail, cachetic-appearing, emaciating elderly male in no acute distress laying in bed   HENT:   Head: Normocephalic and atraumatic.   Oral mucous membranes dry  Temporal wasting " bilaterally  Sunken eyes   Eyes: Pupils are equal, round, and reactive to light. Conjunctivae and EOM are normal.   Neck: Normal range of motion.   Unable to visualize jugular pulse   Cardiovascular: Normal heart sounds and intact distal pulses.   Pulmonary/Chest: No respiratory distress.   Diminished lung sounds bilaterally   Abdominal: Soft. He exhibits distension. There is tenderness. There is no rebound and no guarding.   Ascites w/ shifting dullness and positive fluid wave   Genitourinary: Rectum:      Guaiac result positive.     Musculoskeletal:      Comments: +1 LLE edema  +2 RLE edema   Lymphadenopathy:     He has no cervical adenopathy.   Neurological: He is alert and oriented to person, place, and time. No cranial nerve deficit. GCS score is 15.   Skin: Skin is warm and dry. He is not diaphoretic.   Psychiatric: Mood and affect normal.     Data Review       Old Records Request:   Completed  Current Records review/summary: Completed    Lab Data Review:  Recent Results (from the past 24 hour(s))   COD - Adult (Type and Screen)    Collection Time: 01/22/20 10:29 PM   Result Value Ref Range    ABO Grouping Only O     Rh Grouping Only POS     Antibody Screen-Cod NEG    CBC WITH DIFFERENTIAL    Collection Time: 01/22/20 10:30 PM   Result Value Ref Range    WBC 3.5 (L) 4.8 - 10.8 K/uL    RBC 2.92 (L) 4.70 - 6.10 M/uL    Hemoglobin 8.5 (L) 14.0 - 18.0 g/dL    Hematocrit 26.6 (L) 42.0 - 52.0 %    MCV 91.1 81.4 - 97.8 fL    MCH 29.1 27.0 - 33.0 pg    MCHC 32.0 (L) 33.7 - 35.3 g/dL    RDW 48.7 35.9 - 50.0 fL    Platelet Count 27 (LL) 164 - 446 K/uL    MPV 12.9 9.0 - 12.9 fL    Neutrophils-Polys 72.30 (H) 44.00 - 72.00 %    Lymphocytes 19.30 (L) 22.00 - 41.00 %    Monocytes 6.90 0.00 - 13.40 %    Eosinophils 0.60 0.00 - 6.90 %    Basophils 0.00 0.00 - 1.80 %    Immature Granulocytes 0.90 0.00 - 0.90 %    Nucleated RBC 0.00 /100 WBC    Neutrophils (Absolute) 2.52 1.82 - 7.42 K/uL    Lymphs (Absolute) 0.67 (L) 1.00 -  4.80 K/uL    Monos (Absolute) 0.24 0.00 - 0.85 K/uL    Eos (Absolute) 0.02 0.00 - 0.51 K/uL    Baso (Absolute) 0.00 0.00 - 0.12 K/uL    Immature Granulocytes (abs) 0.03 0.00 - 0.11 K/uL    NRBC (Absolute) 0.00 K/uL   CMP    Collection Time: 01/22/20 10:30 PM   Result Value Ref Range    Sodium 136 135 - 145 mmol/L    Potassium 4.7 3.6 - 5.5 mmol/L    Chloride 110 96 - 112 mmol/L    Co2 14 (L) 20 - 33 mmol/L    Anion Gap 12.0 (H) 0.0 - 11.9    Glucose 111 (H) 65 - 99 mg/dL    Bun 84 (HH) 8 - 22 mg/dL    Creatinine 4.24 (H) 0.50 - 1.40 mg/dL    Calcium 7.8 (L) 8.5 - 10.5 mg/dL    AST(SGOT) 8 (L) 12 - 45 U/L    ALT(SGPT) <5 2 - 50 U/L    Alkaline Phosphatase 52 30 - 99 U/L    Total Bilirubin 0.5 0.1 - 1.5 mg/dL    Albumin 2.7 (L) 3.2 - 4.9 g/dL    Total Protein 5.1 (L) 6.0 - 8.2 g/dL    Globulin 2.4 1.9 - 3.5 g/dL    A-G Ratio 1.1 g/dL   LIPASE    Collection Time: 01/22/20 10:30 PM   Result Value Ref Range    Lipase 13 11 - 82 U/L   DIAGNOSTIC ALCOHOL    Collection Time: 01/22/20 10:30 PM   Result Value Ref Range    Diagnostic Alcohol 0.00 0.00 g/dL   ESTIMATED GFR    Collection Time: 01/22/20 10:30 PM   Result Value Ref Range    GFR If  16 (A) >60 mL/min/1.73 m 2    GFR If Non  13 (A) >60 mL/min/1.73 m 2   PERIPHERAL SMEAR REVIEW    Collection Time: 01/22/20 10:30 PM   Result Value Ref Range    Peripheral Smear Review see below    PLATELET ESTIMATE    Collection Time: 01/22/20 10:30 PM   Result Value Ref Range    Plt Estimation Marked Decrease    IMMATURE PLT FRACTION    Collection Time: 01/22/20 10:30 PM   Result Value Ref Range    Imm. Plt Fraction 6.9 0.6 - 13.1 K/uL   DIFFERENTIAL COMMENT    Collection Time: 01/22/20 10:30 PM   Result Value Ref Range    Comments-Diff see below    proBrain Natriuretic Peptide, NT    Collection Time: 01/22/20 10:30 PM   Result Value Ref Range    NT-proBNP 66605 (H) 0 - 125 pg/mL   VITAMIN B12    Collection Time: 01/22/20 10:30 PM   Result Value Ref  Range    Vitamin B12 -True Cobalamin >1500 (H) 211 - 911 pg/mL   FOLATE    Collection Time: 01/22/20 10:30 PM   Result Value Ref Range    Folate -Folic Acid 6.4 >4.0 ng/mL   RETICULOCYTES COUNT    Collection Time: 01/22/20 10:30 PM   Result Value Ref Range    Reticulocyte Count 3.0 (H) 0.8 - 2.1 %    Retic, Absolute 0.09 (H) 0.04 - 0.06 M/uL    Imm. Reticulocyte Fraction 21.7 (H) 9.3 - 17.4 %    Retic Hgb Equivalent 31.1 29.0 - 35.0 pg/cell   LACTIC ACID    Collection Time: 01/23/20  1:38 AM   Result Value Ref Range    Lactic Acid 1.2 0.5 - 2.0 mmol/L   CBC WITH DIFFERENTIAL    Collection Time: 01/23/20  1:38 AM   Result Value Ref Range    WBC 3.9 (L) 4.8 - 10.8 K/uL    RBC 2.68 (L) 4.70 - 6.10 M/uL    Hemoglobin 7.8 (L) 14.0 - 18.0 g/dL    Hematocrit 25.0 (L) 42.0 - 52.0 %    MCV 93.3 81.4 - 97.8 fL    MCH 29.1 27.0 - 33.0 pg    MCHC 31.2 (L) 33.7 - 35.3 g/dL    RDW 49.8 35.9 - 50.0 fL    Platelet Count 26 (LL) 164 - 446 K/uL    MPV 12.7 9.0 - 12.9 fL    Neutrophils-Polys 67.70 44.00 - 72.00 %    Lymphocytes 21.70 (L) 22.00 - 41.00 %    Monocytes 9.00 0.00 - 13.40 %    Eosinophils 0.80 0.00 - 6.90 %    Basophils 0.00 0.00 - 1.80 %    Immature Granulocytes 0.80 0.00 - 0.90 %    Nucleated RBC 0.00 /100 WBC    Neutrophils (Absolute) 2.65 1.82 - 7.42 K/uL    Lymphs (Absolute) 0.85 (L) 1.00 - 4.80 K/uL    Monos (Absolute) 0.35 0.00 - 0.85 K/uL    Eos (Absolute) 0.03 0.00 - 0.51 K/uL    Baso (Absolute) 0.00 0.00 - 0.12 K/uL    Immature Granulocytes (abs) 0.03 0.00 - 0.11 K/uL    NRBC (Absolute) 0.00 K/uL   OSMOLALITY URINE    Collection Time: 01/23/20  3:11 AM   Result Value Ref Range    Osmolality Urine 363 300 - 900 mOsm/kg H2O   HEMOGLOBIN AND HEMATOCRIT    Collection Time: 01/23/20  4:05 AM   Result Value Ref Range    Hemoglobin 7.9 (L) 14.0 - 18.0 g/dL    Hematocrit 24.6 (L) 42.0 - 52.0 %   Basic Metabolic Panel    Collection Time: 01/23/20  4:23 AM   Result Value Ref Range    Sodium 138 135 - 145 mmol/L     Potassium 4.4 3.6 - 5.5 mmol/L    Chloride 112 96 - 112 mmol/L    Co2 15 (L) 20 - 33 mmol/L    Glucose 59 (L) 65 - 99 mg/dL    Bun 79 (HH) 8 - 22 mg/dL    Creatinine 3.98 (H) 0.50 - 1.40 mg/dL    Calcium 7.4 (L) 8.5 - 10.5 mg/dL    Anion Gap 11.0 0.0 - 11.9   ESTIMATED GFR    Collection Time: 01/23/20  4:23 AM   Result Value Ref Range    GFR If  18 (A) >60 mL/min/1.73 m 2    GFR If Non  14 (A) >60 mL/min/1.73 m 2       Imaging/Procedures Review:    Independant Imaging Review: Completed  CT-ABDOMEN-PELVIS W/O   Final Result      1.  New anasarca with medium-large pleural effusions, ascites and diffuse fat stranding making detection of inflammation insensitive      2.  Ascites is low density suggesting it is not hemorrhagic      3.  Stable long segmental distal colonic wall thickening without bowel obstruction. This suggests chronic inflammation of infectious favored over inflammatory causes.      4.  4.1 cm ascending aortic aneurysm      5.  Severe coronary artery disease, aortic valve calcification indicating stenosis, left hepatic cyst and/or hemangioma and remote granulomatous disease as before      DX-CHEST-PORTABLE (1 VIEW)   Final Result      Enlarging right greater than left pleural effusions, worsening basilar atelectasis. Consolidation not excluded      EC-ECHOCARDIOGRAM COMPLETE W/O CONT    (Results Pending)   US-RENAL    (Results Pending)             Assessment/Plan     * Acute decompensated heart failure (HCC)  Assessment & Plan  Profoundly elevated BNP of 24,000 at [facility] w/ worsening shortness of breath, orthopnea, bilateral pleural effusions, and anasarca  Last TTE in 07/2019 demonstrated LVEF of 65% w/o major structural defects  Diffuse coronary calcifications noted on CT chest  No prior documented hx of cardiomyopathy, cardiac ischemic, ACS, etc  Volume overloaded state peripherally due to poor pump function  Plan:  - One time dose of Lasix 40mg  administered in ED   - TTE ordered for AM  - Discuss consulting cardiology  - Follow up EKG    Melanotic stools  Assessment & Plan  Several month hx of post-prandial abdominal pain and dark tarry stools  No masses noted on CT abd/pelvis concerning for malignancy  Extensive arterial calcifications noted throughout visceral vasculature  Chronic mesenteric ischemia? Possible upper GI bleed? Peptic ulcer disease?   Unclear etiology  Plan:  - Protonix 40mg BID   - Discuss consulting GI for EGD/Colonoscopy    Pancytopenia (HCC)  Assessment & Plan  Pancytopenic w/ leukopenia, anemia, and thrombocytopenia  Myeloproliferative disease? MGUS? Multiple myeloma?  Unclear etiology  Thrombocytopenic w/ bleeding from gastrointestinal tract, nose, and genitourinary tract  Anemic w/ drop in Hemoglobin <8.0  Ordered for 1u pRBC   Plan:  - Transfuse pRBCs for Hgb<8.0  - Trend H/H q6hrs  - Discuss consulting hematology for bone marrow biopsy  - Discuss transfusing plts <30,000 given active bleeds  - Discuss consulting palliative care  - Follow up SPEP  - Follow up complement levels      Acute on chronic renal failure (HCC)  Assessment & Plan  Cardiorenal? Pre-renal?   Likely cardiorenal   Baseline Cr 2.5-3.2  Serum creatinine >4.0 on presentation  Plan:  - Administered 40mg IV Lasix in ED  - Holding fluids given volume overloaded state w/ bilateral pleural effusions and anasarca  - Follow up urine electrolytes  - Fox catheter in place  - Strict I/Os  - Discuss consulting nephrology    Bilateral pleural effusion  Assessment & Plan  Large bilateral pleural effusions, transudative in appearance on CT  Likely secondary to pump failure in setting of elevated BNP  Plan:  - Discuss aggressive diuresis  - Discuss consulting IR for thoracentesis   - Continuous pulse oximetry    Anasarca  Assessment & Plan  Bilateral pleural effusions w/ ascites w/ bilateral lower extremity pitting edema  Serum albumin 2.7, not that low  Liver function  WNL  Likely secondary to pump failure  Plan:  - Administered 40mg of Lasix in ED  - Discuss continuing aggressive diuresis   - Tap abdomen/chest as needed for therapeutic/diagnostic purposes      Anticipated Hospital stay:  >2 midnights        Quality Measures  Quality-Core Measures   Reviewed items::  Medications reviewed, Radiology images reviewed and Labs reviewed  Fox catheter::  Critically Ill - Requiring Accurate Measurement of Urinary Output  DVT prophylaxis pharmacological::  Contraindicated - High bleeding risk  Ulcer Prophylaxis::  Yes    PCP: Ubaldo Mayfield M.D.

## 2020-01-23 NOTE — PROGRESS NOTES
ICU TRANSFER NOTE:    84 year old male with past medical history of gastritis, H.pylori , CKD, mild aortic stenosis, pulmonary hypertension, BPH, renal stones who was transferred from [facility] for concerns of acute decompensated heart failure and GI bleed. Patient had presented with complaints of progressive generalized weakness, shortness of breath and abdominal pain. Patient also reported ongoing tarry stools for the last couple of months. Workup in [place] significant for glucose of 44 , hemoglobin 9 hematocrit 29.4 platelets 27 INR 1.2 BUN 85 creatinine 4.4 BNP 24,000 TSH 8.2 occult blood positive.  EKG sinus rhythm with rate of 64.  First degree AV block.  Patient given D50 and transferred to [facility] ED.   In the ER here, chest X ray showed right> left pleural effusion,  he was given a dose of 40 mg IV lasix with subsequent gross hematuria. Repeat hemoglobin dropped to 7.8. 1 unit PRBC stat was ordered and pending at the time of my exam. Patient was noted to be progressively hypotensive and desaturating to the 80s with any slight movement. Also hypoglycemic to the 50s. While in the ER, he was also noted to have an episode of syncope.   TEG ordered, patient made NPO, GI and heme/onc consulted. Patient is being transferred to ICU for higher level of care.

## 2020-01-23 NOTE — ED PROVIDER NOTES
"     ED provider note    CHIEF COMPLAINT  Chief Complaint   Patient presents with   • Abdominal Pain     pt c/o 8/10 upr abd pain x3 days with tarry stools N&V that waxes and wanes H&H at OSH 9;23   • Weakness     pt c/o generalized weakness x months    • Shortness of Breath     upon exhertion cxr t OSH showed R pleural effusion        HPI  Shawanda Daley Jr. is a 84 y.o. male with a past medical history of renal insufficiency who was transferred to [facility] ED from [facility] for progressive weakness, anemia with heme positive stool.  Patient originally presented to the ED at [facility] today with complaints of progressive weakness.  Onset 2 weeks ago.  Worse in the last few days.  Associated with 30-40 pound unintentional weight loss in the last year.  Black tarry stool for the last month.  Cough intermittently productive of yellow/mucoid sputum.  He also complains of \"pain all over\" but abdominal pain most significant.  Abdominal pain is described as sharp, waxing and waning, beginning in left lower quadrant and moving across the abdomen.  Associated with nausea.  No vomiting. No specific alleviating or aggravating factors.  In contrast to triage notes, patient does not endorse shortness of breath at present.    He had an appointment with outpatient PCP, Dr. Mayfield, today but was unable to make it due to weakness.  Called EMS who brought patient to [facility] ED.  Outside hospital records report patient needing to be placed on \"15 L of oxygen in route because his pulse oximeter would not show waveform.\"  At [facility] patient received the following work-up: CBC, CMP, lactic acid, BNP, coags, TSH, troponin, UA, chest x-ray, EKG and occult blood.  Laboratory work-up significant for glucose of 44 , hemoglobin 9 hematocrit 29.4 platelets 27 INR 1.2 BUN 85 creatinine 4.4 BNP 24,000 TSH 8.2 occult blood positive.  Chest x-ray with right middle to lower lung opacity questionable for pleural effusion and/or " "developing pneumonia.  EKG sinus rhythm with rate of 64.  First degree AV block.  Patient given D50 and transferred to [facility] ED.    Denies recent illness, sore throat, headache, dizziness, fever, chills, diarrhea, constipation, skin rashes.  Does not smoke, nor has he ever.  Used to drink EtOH on occasion more than 20 years ago. Of note he was diagnosed with H. Pylori in 2019- developed an allergy to one of these medications and was hospitalized for this reaction briefly.         REVIEW OF SYSTEMS  Pertinent positives as stated above.  Pertinent negatives as stated above.   All other systems are negative.     PAST MEDICAL HISTORY(PFS1,2)  Past Medical History:   Diagnosis Date   • Aortic stenosis     mild per cardiology note. Followed by [facility] cardiology.   • Bowel habit changes 10/21/2019    constipation & diarrhea   • BPH (benign prostatic hyperplasia) 3/4/2016   • Breath shortness     10/21/19-started 2yrs ago but has worsened in past 4 weeks. Increases with exertion.    • Cataract     fabiola lens surgery   • Chronic fatigue    • Coagulopathy (HCC) 2020   • Edema of both feet 10/21/2019    and legs with redness. Denies open sores   • Hernia of abdominal wall     10/21/19-above umbilicus and pt states above previous hernia mesh & was told fatty and to push back in prn.    • History of anemia    • Indigestion     no meds   • Loss of appetite    • Low serum vitamin B12 3/15/2019   • Pulmonary hypertension (HCC)     followed by [facility] cardiology   • Renal disorder 2015    kidney stone.    • Renal disorder 10/21/2019    \"weak kidney\".  Elevated creatinine on labs   • Stomach pain 10/21/2019   • Urinary incontinence        FAMILY HISTORY  Family History   Problem Relation Age of Onset   • Other Mother         old age   • Heart Disease Father    • Stroke Father 86         from stroke   • Colon Cancer Father         colon cancer   • Cancer Maternal Uncle         prostate   • Alzheimer's Disease Sister "    • Cancer Brother         LUNG CANCER   • Other Brother         on a blood thinner medication       SOCIAL HISTORY  Social History     Tobacco Use   • Smoking status: Never Smoker   • Smokeless tobacco: Never Used   Substance Use Topics   • Alcohol use: Not Currently     Alcohol/week: 0.0 oz     Frequency: Never     Binge frequency: Never   • Drug use: No     Social History     Substance and Sexual Activity   Drug Use No       SURGICAL HISTORY  Past Surgical History:   Procedure Laterality Date   • LA DX BONE MARROW ASPIRATIONS Left 1/23/2020    Procedure: ASPIRATION, BONE MARROW;  Surgeon: Shy Herrera M.D.;  Location: [facility];  Service: Orthopedics   • LA DX BONE MARROW BIOPSIES Left 1/23/2020    Procedure: BIOPSY, BONE MARROW, USING NEEDLE OR TROCAR;  Surgeon: Shy Herrera M.D.;  Location: [facility];  Service: Orthopedics   • GASTROSCOPY-ENDO  10/24/2019    Procedure: GASTROSCOPY- POSSIBLE BIOPSY, DILATION, POLYPECTOMY, CONTROL OF HEMORRHAGE;  Surgeon: Ephraim Medrano M.D.;  Location: [facility];  Service: Gastroenterology   • EGD W/ENDOSCOPIC ULTRASOUND  10/24/2019    Procedure: EGD, WITH ENDOSCOPIC US;  Surgeon: Ephraim Medrano M.D.;  Location: [facility];  Service: Gastroenterology   • EGD WITH ASP/BX  10/24/2019    Procedure: EGD, WITH ASPIRATION BIOPSY- FNA;  Surgeon: Ephraim Medrano M.D.;  Location: [facility];  Service: Gastroenterology   • ENDOSCOPY-ESOPH/STOMACH  10/02/2019   • COLONOSCOPY  2018   • BREAST BIOPSY Left 11/18/2016    Procedure: BREAST BIOPSY;  Surgeon: Adrianne Dobbins M.D.;  Location: [facility];  Service:    • INGUINAL HERNIA REPAIR ROBOTIC Bilateral 8/10/2016    Procedure: INGUINAL HERNIA REPAIR ROBOTIC with mesh  ;  Surgeon: Francis Fletcher M.D.;  Location: [facility];  Service:    • COLONOSCOPY  2013   • CYST EXCISION  2000    anal cyst       CURRENT  "MEDICATIONS  Home Medications     Reviewed by Georgina Cowart (Pharmacy Tech) on 01/23/20 at 0917  Med List Status: Complete   Medication Last Dose Status   albuterol 108 (90 Base) MCG/ACT Aero Soln inhalation aerosol PRN Active   bismuth subsalicylate (PEPTO-BISMOL) 262 MG Chew Tab PRN Active   budesonide-formoterol (SYMBICORT) 80-4.5 MCG/ACT Aerosol 1/22/2020 Active   gabapentin (NEURONTIN) 100 MG Cap 1/22/2020 Active   nystatin-triamcinalone (MYCOLOG) 423197-8.1 UNIT/GM-% Ointment PRN Active   omeprazole (PRILOSEC) 20 MG delayed-release capsule 1/22/2020 Active   ondansetron (ZOFRAN ODT) 4 MG TABLET DISPERSIBLE PRN Active   tamsulosin (FLOMAX) 0.4 MG capsule 1/22/2020 Active                ALLERGIES  No Known Allergies    PHYSICAL EXAM (2,8)  VITAL SIGNS: BP (!) 59/45   Pulse (!) 45   Temp (!) 35.4 °C (95.8 °F) (Temporal)   Resp (!) 6   Ht 1.778 m (5' 10\")   Wt 72.6 kg (160 lb)   SpO2 91%   BMI 22.96 kg/m²  Reviewed   Constitutional: Alert, awake and in no acute distress elderly appearing male, cachectic, pale/jaundiced  HENT: Normocephalic atraumatic, moist mucous membranes, temporal wasting bilaterally  Eyes: EOMI  Cardiovascular: Regular rate and rhythm, 2/6 systolic ejection murmur most prominent over aortic valve, 2+ pitting edema bilateral lower extremities  Respiratory: Normal work of breathing, diminished throughout, no obvious wheezes, saturating at 95% on room air   Gastrointestinal: Abdomen distended, soft, voluntary guarding on palpation tenderness to left lower quadrant and epigastric area, bowel sounds positive throughout, small ventral hernia noted just superior to umbilicus reducible approximately 1 to 2 cm in diameter  Skin: Pallor noted diffusely  Genitourinary: Deferred  Neurologic: Alert and oriented x3, grossly nonfocal, answers examiner's questions appropriately- although sometimes repeats himself  Psychiatric: Appropriate affect      RADIOLOGY/PROCEDURES  CT-ABDOMEN-PELVIS W/O "   Final Result      1.  New anasarca with medium-large pleural effusions, ascites and diffuse fat stranding making detection of inflammation insensitive      2.  Ascites is low density suggesting it is not hemorrhagic      3.  Stable long segmental distal colonic wall thickening without bowel obstruction. This suggests chronic inflammation of infectious favored over inflammatory causes.      4.  4.1 cm ascending aortic aneurysm      5.  Severe coronary artery disease, aortic valve calcification indicating stenosis, left hepatic cyst and/or hemangioma and remote granulomatous disease as before      DX-CHEST-PORTABLE (1 VIEW)   Final Result      Enlarging right greater than left pleural effusions, worsening basilar atelectasis. Consolidation not excluded          LABORATORY: Reviewed as below.  Labs Reviewed   CBC WITH DIFFERENTIAL - Abnormal; Notable for the following components:       Result Value    WBC 3.5 (*)     RBC 2.92 (*)     Hemoglobin 8.5 (*)     Hematocrit 26.6 (*)     MCHC 32.0 (*)     Platelet Count 27 (*)     Neutrophils-Polys 72.30 (*)     Lymphocytes 19.30 (*)     Lymphs (Absolute) 0.67 (*)     All other components within normal limits   COMP METABOLIC PANEL - Abnormal; Notable for the following components:    Co2 14 (*)     Anion Gap 12.0 (*)     Glucose 111 (*)     Bun 84 (*)     Creatinine 4.24 (*)     Calcium 7.8 (*)     AST(SGOT) 8 (*)     Albumin 2.7 (*)     Total Protein 5.1 (*)     All other components within normal limits   ESTIMATED GFR - Abnormal; Notable for the following components:    GFR If  16 (*)     GFR If Non  13 (*)     All other components within normal limits   PROBRAIN NATRIURETIC PEPTIDE, NT - Abnormal; Notable for the following components:    NT-proBNP 70206 (*)     All other components within normal limits   CBC WITH DIFFERENTIAL - Abnormal; Notable for the following components:    WBC 3.9 (*)     RBC 2.68 (*)     Hemoglobin 7.8 (*)      Hematocrit 25.0 (*)     MCHC 31.2 (*)     Platelet Count 26 (*)     Lymphocytes 21.70 (*)     Lymphs (Absolute) 0.85 (*)     All other components within normal limits   VITAMIN B12 - Abnormal; Notable for the following components:    Vitamin B12 -True Cobalamin >1500 (*)     All other components within normal limits   RETICULOCYTES COUNT - Abnormal; Notable for the following components:    Reticulocyte Count 3.0 (*)     Retic, Absolute 0.09 (*)     Imm. Reticulocyte Fraction 21.7 (*)     All other components within normal limits   HEMOGLOBIN AND HEMATOCRIT - Abnormal; Notable for the following components:    Hemoglobin 7.9 (*)     Hematocrit 24.6 (*)     All other components within normal limits   BASIC METABOLIC PANEL - Abnormal; Notable for the following components:    Co2 15 (*)     Glucose 59 (*)     Bun 79 (*)     Creatinine 3.98 (*)     Calcium 7.4 (*)     All other components within normal limits   ESTIMATED GFR - Abnormal; Notable for the following components:    GFR If  18 (*)     GFR If Non  14 (*)     All other components within normal limits   BASIC TEG W HEPARINASE - Abnormal; Notable for the following components:    Clot Kinetics Heparinase 8.0 (*)     Clot Angle Heparinase 31.6 (*)     Max Clot Heparinase 34.1 (*)     All other components within normal limits    Narrative:     Do you want to extend TEG graph to LY30? (If no, graph will  terminate at MA)->Yes  Is the patient on heparin (including low molecular weight  heparin, subcutaneous heparin, or lovenox)?->No   PROCALCITONIN - Abnormal; Notable for the following components:    Procalcitonin 0.83 (*)     All other components within normal limits    Narrative:     Do you want to extend TEG graph to LY30? (If no, graph will  terminate at MA)->Yes  Is the patient on heparin (including low molecular weight  heparin, subcutaneous heparin, or lovenox)?->No   BASIC TEG - Abnormal; Notable for the following components:     Clot Kinetics-K 12.2 (*)     Clot Angle-Angle 23.4 (*)     Maximum Clot Strength-MA 29.4 (*)     All other components within normal limits    Narrative:     Do you want to extend TEG graph to LY30? (If no, graph will  terminate at MA)->Yes  Is the patient on heparin (including low molecular weight  heparin, subcutaneous heparin, or lovenox)?->No   FIBRINOGEN - Abnormal; Notable for the following components:    Fibrinogen 185 (*)     All other components within normal limits    Narrative:     Indicate which anticoagulants the patient is on:->UNKNOWN   CBC WITH DIFFERENTIAL - Abnormal; Notable for the following components:    WBC 3.6 (*)     RBC 2.93 (*)     Hemoglobin 8.6 (*)     Hematocrit 26.5 (*)     MCHC 32.5 (*)     Platelet Count 25 (*)     Neutrophils-Polys 75.00 (*)     Lymphocytes 15.60 (*)     Lymphs (Absolute) 0.56 (*)     All other components within normal limits   COMP METABOLIC PANEL - Abnormal; Notable for the following components:    Co2 15 (*)     Bun 82 (*)     Creatinine 4.18 (*)     Calcium 7.4 (*)     AST(SGOT) 8 (*)     Albumin 2.4 (*)     Total Protein 4.7 (*)     All other components within normal limits    Narrative:     Indicate which anticoagulants the patient is on:->UNKNOWN   IRON/TOTAL IRON BIND - Abnormal; Notable for the following components:    Total Iron Binding 125 (*)     % Saturation 97 (*)     All other components within normal limits    Narrative:     Indicate which anticoagulants the patient is on:->UNKNOWN   APTT - Abnormal; Notable for the following components:    APTT 47.5 (*)     All other components within normal limits    Narrative:     Indicate which anticoagulants the patient is on:->UNKNOWN   PROTHROMBIN TIME - Abnormal; Notable for the following components:    PT 17.0 (*)     INR 1.35 (*)     All other components within normal limits    Narrative:     Indicate which anticoagulants the patient is on:->UNKNOWN   D-DIMER - Abnormal; Notable for the following components:     D-Dimer Screen 4.21 (*)     All other components within normal limits    Narrative:     Indicate which anticoagulants the patient is on:->UNKNOWN   VITAMIN B12 - Abnormal; Notable for the following components:    Vitamin B12 -True Cobalamin >1500 (*)     All other components within normal limits    Narrative:     Indicate which anticoagulants the patient is on:->UNKNOWN   LDH - Abnormal; Notable for the following components:    LDH Total 68 (*)     All other components within normal limits    Narrative:     Indicate which anticoagulants the patient is on:->UNKNOWN   RETICULOCYTES COUNT - Abnormal; Notable for the following components:    Reticulocyte Count 3.1 (*)     Retic, Absolute 0.09 (*)     Imm. Reticulocyte Fraction 21.2 (*)     All other components within normal limits   ESTIMATED GFR - Abnormal; Notable for the following components:    GFR If  17 (*)     GFR If Non  14 (*)     All other components within normal limits    Narrative:     Indicate which anticoagulants the patient is on:->UNKNOWN   COMP METABOLIC PANEL - Abnormal; Notable for the following components:    Co2 15 (*)     Anion Gap 12.0 (*)     Bun 79 (*)     Creatinine 4.21 (*)     Calcium 7.6 (*)     AST(SGOT) 8 (*)     Albumin 2.6 (*)     Total Protein 4.9 (*)     All other components within normal limits   CBC WITH DIFFERENTIAL - Abnormal; Notable for the following components:    RBC 2.87 (*)     Hemoglobin 8.4 (*)     Hematocrit 25.9 (*)     MCHC 32.4 (*)     Platelet Count 47 (*)     Neutrophils-Polys 89.60 (*)     Lymphocytes 4.70 (*)     Lymphs (Absolute) 0.38 (*)     All other components within normal limits   ESTIMATED GFR - Abnormal; Notable for the following components:    GFR If  16 (*)     GFR If Non  14 (*)     All other components within normal limits   CBC WITH DIFFERENTIAL - Abnormal; Notable for the following components:    RBC 2.80 (*)     Hemoglobin 8.3 (*)      Hematocrit 25.7 (*)     MCHC 32.3 (*)     Platelet Count 40 (*)     Neutrophils-Polys 88.70 (*)     Lymphocytes 6.30 (*)     Lymphs (Absolute) 0.46 (*)     All other components within normal limits   CBC WITH DIFFERENTIAL - Abnormal; Notable for the following components:    RBC 2.78 (*)     Hemoglobin 7.9 (*)     Hematocrit 25.8 (*)     MCHC 30.6 (*)     Platelet Count 35 (*)     Neutrophils-Polys 88.70 (*)     Lymphocytes 5.20 (*)     Lymphs (Absolute) 0.30 (*)     All other components within normal limits   COMP METABOLIC PANEL - Abnormal; Notable for the following components:    Co2 15 (*)     Glucose 116 (*)     Bun 84 (*)     Creatinine 4.39 (*)     Calcium 7.5 (*)     AST(SGOT) 10 (*)     Albumin 2.5 (*)     Total Protein 4.9 (*)     All other components within normal limits   ESTIMATED GFR - Abnormal; Notable for the following components:    GFR If  16 (*)     GFR If Non  13 (*)     All other components within normal limits   PHOSPHORUS - Abnormal; Notable for the following components:    Phosphorus 6.4 (*)     All other components within normal limits   CBC WITH DIFFERENTIAL - Abnormal; Notable for the following components:    RBC 2.79 (*)     Hemoglobin 8.2 (*)     Hematocrit 25.1 (*)     MCHC 32.7 (*)     Platelet Count 30 (*)     Neutrophils-Polys 87.60 (*)     Lymphocytes 5.50 (*)     Lymphs (Absolute) 0.27 (*)     All other components within normal limits   PLATELET MAPPING WITH BASIC TEG - Abnormal; Notable for the following components:    Clot Kinetics-K 6.5 (*)     Clot Angle-Angle 38.3 (*)     Maximum Clot Strength-MA 38.7 (*)     All other components within normal limits    Narrative:     Is the patient on heparin (including low molecular weight  heparin, subcutaneous heparin, or lovenox)?->No  Do you want to extend TEG graph to LY30? (If no, graph will  terminate at MA)->Yes   ACCU-CHEK GLUCOSE - Abnormal; Notable for the following components:    Glucose - Accu-Ck  54 (*)     All other components within normal limits   ACCU-CHEK GLUCOSE - Abnormal; Notable for the following components:    Glucose - Accu-Ck 120 (*)     All other components within normal limits   ACCU-CHEK GLUCOSE - Abnormal; Notable for the following components:    Glucose - Accu-Ck 108 (*)     All other components within normal limits   ACCU-CHEK GLUCOSE - Abnormal; Notable for the following components:    Glucose - Accu-Ck 59 (*)     All other components within normal limits   ACCU-CHEK GLUCOSE - Abnormal; Notable for the following components:    Glucose - Accu-Ck 116 (*)     All other components within normal limits   ACCU-CHEK GLUCOSE - Abnormal; Notable for the following components:    Glucose - Accu-Ck 100 (*)     All other components within normal limits   ACCU-CHEK GLUCOSE - Abnormal; Notable for the following components:    Glucose - Accu-Ck 144 (*)     All other components within normal limits   LIPASE   DIAGNOSTIC ALCOHOL   COD (ADULT)   ABO RH CONFIRM   PERIPHERAL SMEAR REVIEW   PLATELET ESTIMATE   IMMATURE PLT FRACTION   DIFFERENTIAL COMMENT   LACTIC ACID   OSMOLALITY URINE   URINE SODIUM RANDOM   URINE CREATININE RANDOM   FOLATE   SPEP W/REFLEX TO KAI, A, G, M   FERRITIN    Narrative:     Indicate which anticoagulants the patient is on:->UNKNOWN   HAPTOGLOBIN   KAYLEE WITH ANTI-IGG REAGENT AND ANTI-C3D    Narrative:     Print Consent?->No   FOLATE    Narrative:     Indicate which anticoagulants the patient is on:->UNKNOWN   PLATELETS REQUEST   PERIPHERAL SMEAR REVIEW   IMMATURE PLT FRACTION   DIFFERENTIAL COMMENT   CRYOPRECIPITATE   HISTOLOGY REQUEST   PATHOLOGY SPECIMEN   HEPATITIS PANEL ACUTE(4 COMPONENTS)   DIAGNOSTIC ALCOHOL   MAGNESIUM   LACTIC ACID   PROTEIN/CREAT RATIO URINE    Narrative:     Urine Test:->Random   ACCU-CHEK GLUCOSE   ACCU-CHEK GLUCOSE   ACCU-CHEK GLUCOSE   RELEASE RED BLOOD CELLS   RELEASE PLATELET PHERESIS   RELEASE PLATELET PHERESIS   RELEASE CRYOPRECIPITATE   RELEASE  CRYOPRECIPITATE   RELEASE PLATELET PHERESIS         COURSE & MEDICAL DECISION MAKING &PLAN:  84-year-old male with past medical history of renal insufficiency transfer to [facility] ED for progressive weakness, anemia with occult positive stool.  Outside medical records reviewed included but not limited to  CBC, CMP, lactic acid, BNP, coags, TSH, troponin, UA, chest x-ray, EKG and occult blood.  Laboratory work-up significant for glucose of 44 , hemoglobin 9 hematocrit 29.4, platelets 27, INR 1.2, BUN 85, creatinine 4.4, BNP 24,000, TSH 8.2 and occult blood positive.  Chest x-ray with right middle to lower lung opacity questionable for pleural effusion and/or developing pneumonia.  Differential diagnosis for abdominal pain includes but is not limited to: Colitis versus diverticulitis versus versus urinary tract infection versus peptic ulcer disease versus underlying malignancy versus pancreatitis.    On my physical exam, ill-appearing elderly male gentleman with distended tender abdomen.  Vital signs stable and afebrile.  Initial lab work with Hemoccult positive stool and anemia.  Based on review of his previous labs he also has an acute on chronic kidney injury-Baseline creatinine in the last 2 years around 1-2.  Will obtain CT abdomen pelvis and lipase.  We will also add repeat CBC CMP and stool occult blood.      11:19 PM Paged [facility] Medicine Team for consultation regarding admission    11:30 PM spoke with the [facility] internal medicine resident who agrees to admit patient.  Pending results of CT abdomen pelvis.  We will follow-up imaging results.  Pending repeat lab orders.    11:43 PM WBC 3.5 with left shift, Hemoglobin 8.5, Hematocrit 26, Platelets 27 (unchanged from [facility]), BUN 84, Creatinine 4.24, lipase negative.  Pending CT abdomen pelvis read.    11:59 PM one-time dose of IV ceftriaxone and metronidazole ordered now.  For CT abdomen pelvis concerning for colitis versus diverticulitis.  In  correlation with patient's left lower quadrant abdominal pain.  Pending official read on CT abdomen pelvis.    12:15 AM CT abdomen/pelvios read confirms colitis. As above, treatment has already been initiated.     CONDITION:   Stable, prognosis generally poor.    FINAL IMPRESSION  Colitis  Unintentional weight loss  Upper GI bleed in the setting of known H pylori  Anemia, likely blood loss  Weakness  Thrombocytopenia  Pleural effusion      Electronically signed by: Yoselin Oh M.D., 1/22/2020 10:32 PM  History, physical, work-up and medical decision making made in collaboration with Dr. Mott, attending ER physician, who is aware of the patient and in agreement with aforementioned documentation.    I independently evaluated the patient and repeated the important components of the history and physical. I discussed the management with the resident. I have reviewed and agree with the pertinent clinical information above including history, exam, study findings, and recommendations. This elderly, and chronically ill appearing gentleman was sent to us after several days of worsening weakness and abdominal pain, in the setting of hypoxia, weeks, of progressive symptoms of GI bleed and weight loss, and multiple abnormal lab values involving multiple organ systems. He has been stable in the ER, and has been treated aggressively based on symptoms, even prior to CT results.    Electronically signed by: Myke Mott M.D., 1/26/2020 1:30 PM

## 2020-01-23 NOTE — SENIOR ADMIT NOTE
Senior Admit Note    Patient: Shawanda Daley.  MRN: [id number].                               Chief complaint: Trouble breathing.    Assessment and Plan:    #Acute Decompensated HF:  #Acute Kidney Injury: Suspect patient with cardiorenal secondary to acute HF. Also, with significant pleural effusion R>L, anasarca. Previous ECHO in July 2019 w/out super significant abnormalities. Also, patient on exam is dry as a bone, and labs indicative of prerenal SHIRA. Will treat with diuresis in effort to increase cardiac output and renal perfusion, but will also need fluids at some point soon. Also, w/likely GI bleed (possitive hemoccult stool). RPI < 2 indicative of hypoproliferation.    Patient asked the same questions multiple times during my interview even after multiple explanations of questions. Capacity?      -Furosemide IV.   -Monitor BP.  -ECHO.  -Daily weights.  -I&Os.  -Cardiology consult in AM.  -PPI IV.  -GI consult in AM.  -Transfuse if PLT < 10 K/u.  -Thoracentesis.  -Close glucose monitoring.      Admit: Inpatient. To Telemetry.  Continuous Cardiac Monitoring: ADHF.  DVT prophylaxis: SCDs.  Code status: DNAR/DNI.    For complete details, please refer to H&P by Dr. Dick.    Eddie Driver M.D.

## 2020-01-23 NOTE — ASSESSMENT & PLAN NOTE
Will do thoracentesis given large right pleural effusion once coagulation is corrected.   Plan tomorrow. Will give 2U Platelets and cryo today  Send pleural fluid for analysis. LDH, protein, cell count, pH, routine gram stain/cx.

## 2020-01-23 NOTE — ASSESSMENT & PLAN NOTE
TEG with MA 29.   Transfused 2U platelet yesterday   CBC Q4H  Transfusion parameter: Hgb <8, platelet <50, fibrinogen <200, INR >2

## 2020-01-23 NOTE — ASSESSMENT & PLAN NOTE
Presumed due to his severe coagulapathy, rather than anatomical. CT A/P didn't show any abnormality  CBI

## 2020-01-23 NOTE — ASSESSMENT & PLAN NOTE
"UGI given hx of melena and \"hematemesis\"  GI consulted for need of EGD  Protonix 40 BID  Pt s/p 1U PRBC.   CBC Q4H  Transfuse if Hgb <8, plt <50k, or evidence of abnormal TEG  NG tube was placed and no coffee ground emesis. Only bilious  Hgb is stable at this point  "

## 2020-01-24 NOTE — PROGRESS NOTES
".  HEMATOLOGY-ONCOLOGY PROGRESS NOTE  Cytopenias, acute GI bleed, hematuria  and Acute renal failure admitted to ICU on 1/23/20.     Subjective:  He is in ICU, NGT blood. No fevers or chills.   He remains critically ill.     Objective:  Medications reviewed and notable for:  Current Facility-Administered Medications   Medication Dose   • ipratropium-albuterol (DUONEB) nebulizer solution  3 mL   • senna-docusate (PERICOLACE or SENOKOT S) 8.6-50 MG per tablet 2 Tab  2 Tab    And   • polyethylene glycol/lytes (MIRALAX) PACKET 1 Packet  1 Packet    And   • magnesium hydroxide (MILK OF MAGNESIA) suspension 30 mL  30 mL    And   • bisacodyl (DULCOLAX) suppository 10 mg  10 mg   • Respiratory Care per Protocol     • ondansetron (ZOFRAN) syringe/vial injection 4 mg  4 mg   • ondansetron (ZOFRAN ODT) dispertab 4 mg  4 mg   • pantoprazole (PROTONIX) injection 40 mg  40 mg   • albuterol inhaler 2 Puff  2 Puff   • budesonide-formoterol (SYMBICORT) 80-4.5 MCG/ACT inhaler 2 Puff  2 Puff   • tamsulosin (FLOMAX) capsule 0.4 mg  0.4 mg   • glucose 4 g chewable tablet 16 g  16 g    And   • DEXTROSE 10% BOLUS 250 mL  250 mL   • cefTRIAXone (ROCEPHIN) 1 g in  mL IVPB  1 g   • metroNIDAZOLE (FLAGYL) IVPB 500 mg  500 mg   • norepinephrine (LEVOPHED) 8 mg in  mL Infusion  0-30 mcg/min       ROS:   Could not complete due to MS changes     /72   Pulse 76   Temp 35.9 °C (96.7 °F) (Temporal)   Resp (!) 24   Ht 1.778 m (5' 10\")   Wt 72.6 kg (160 lb)   SpO2 98%       General:  comfortable, NAD, cachetic looking   HEENT:  PERRLA , Anicteric   Neck:   supple, no lymphadenopathy  Cor:   regular rate and rhythm, tachy   Pulm:   clear to auscultation bilaterally, decreased BS lower bases.   Abd:   Soft, Distended, NT, +BS   Extremities:  Edema bilaterally     Labs reviewed and notable for:  Recent Labs     01/24/20  0010 01/24/20  0400 01/24/20  0834   WBC 7.3 5.8 4.9   RBC 2.80* 2.78* 2.79*   HEMOGLOBIN 8.3* 7.9* 8.2* "   HEMATOCRIT 25.7* 25.8* 25.1*   MCV 91.8 92.8 90.0   MCH 29.6 28.4 29.4   MCHC 32.3* 30.6* 32.7*   RDW 49.6 49.8 49.2   PLATELETCT 40* 35* 30*   MPV 11.4 11.3 11.9     Recent Labs     01/23/20  1114   APTT 47.5*   INR 1.35*     .@CMP  Recent Results (from the past 24 hour(s))   EC-ECHOCARDIOGRAM COMPLETE W/O CONT    Collection Time: 01/23/20 10:42 AM   Result Value Ref Range    Eject.Frac. MOD 4C 66.7     Left Ventrical Ejection Fraction 65    FIBRINOGEN    Collection Time: 01/23/20 11:14 AM   Result Value Ref Range    Fibrinogen 185 (L) 215 - 460 mg/dL   CBC WITH DIFFERENTIAL    Collection Time: 01/23/20 11:14 AM   Result Value Ref Range    WBC 3.6 (L) 4.8 - 10.8 K/uL    RBC 2.93 (L) 4.70 - 6.10 M/uL    Hemoglobin 8.6 (L) 14.0 - 18.0 g/dL    Hematocrit 26.5 (L) 42.0 - 52.0 %    MCV 90.4 81.4 - 97.8 fL    MCH 29.4 27.0 - 33.0 pg    MCHC 32.5 (L) 33.7 - 35.3 g/dL    RDW 47.8 35.9 - 50.0 fL    Platelet Count 25 (LL) 164 - 446 K/uL    MPV 12.0 9.0 - 12.9 fL    Neutrophils-Polys 75.00 (H) 44.00 - 72.00 %    Lymphocytes 15.60 (L) 22.00 - 41.00 %    Monocytes 7.50 0.00 - 13.40 %    Eosinophils 0.80 0.00 - 6.90 %    Basophils 0.30 0.00 - 1.80 %    Immature Granulocytes 0.80 0.00 - 0.90 %    Nucleated RBC 0.00 /100 WBC    Neutrophils (Absolute) 2.68 1.82 - 7.42 K/uL    Lymphs (Absolute) 0.56 (L) 1.00 - 4.80 K/uL    Monos (Absolute) 0.27 0.00 - 0.85 K/uL    Eos (Absolute) 0.03 0.00 - 0.51 K/uL    Baso (Absolute) 0.01 0.00 - 0.12 K/uL    Immature Granulocytes (abs) 0.03 0.00 - 0.11 K/uL    NRBC (Absolute) 0.00 K/uL   Comp Metabolic Panel    Collection Time: 01/23/20 11:14 AM   Result Value Ref Range    Sodium 137 135 - 145 mmol/L    Potassium 4.6 3.6 - 5.5 mmol/L    Chloride 111 96 - 112 mmol/L    Co2 15 (L) 20 - 33 mmol/L    Anion Gap 11.0 0.0 - 11.9    Glucose 79 65 - 99 mg/dL    Bun 82 (HH) 8 - 22 mg/dL    Creatinine 4.18 (H) 0.50 - 1.40 mg/dL    Calcium 7.4 (L) 8.5 - 10.5 mg/dL    AST(SGOT) 8 (L) 12 - 45 U/L    ALT(SGPT)  <5 2 - 50 U/L    Alkaline Phosphatase 49 30 - 99 U/L    Total Bilirubin 1.2 0.1 - 1.5 mg/dL    Albumin 2.4 (L) 3.2 - 4.9 g/dL    Total Protein 4.7 (L) 6.0 - 8.2 g/dL    Globulin 2.3 1.9 - 3.5 g/dL    A-G Ratio 1.0 g/dL   IRON/TOTAL IRON BIND    Collection Time: 01/23/20 11:14 AM   Result Value Ref Range    Iron 121 50 - 180 ug/dL    Total Iron Binding 125 (L) 250 - 450 ug/dL    % Saturation 97 (H) 15 - 55 %   FERRITIN    Collection Time: 01/23/20 11:14 AM   Result Value Ref Range    Ferritin 166.2 22.0 - 322.0 ng/mL   APTT    Collection Time: 01/23/20 11:14 AM   Result Value Ref Range    APTT 47.5 (H) 24.7 - 36.0 sec   Prothrombin Time    Collection Time: 01/23/20 11:14 AM   Result Value Ref Range    PT 17.0 (H) 12.0 - 14.6 sec    INR 1.35 (H) 0.87 - 1.13   D-DIMER    Collection Time: 01/23/20 11:14 AM   Result Value Ref Range    D-Dimer Screen 4.21 (H) 0.00 - 0.50 ug/mL (FEU)   CLAY (DIRECT MARIA)    Collection Time: 01/23/20 11:14 AM   Result Value Ref Range    Clay With Anti-IgG Reagent NEG     Clay With Anti-C3D Reagent NEG    VITAMIN B12    Collection Time: 01/23/20 11:14 AM   Result Value Ref Range    Vitamin B12 -True Cobalamin >1500 (H) 211 - 911 pg/mL   FOLATE    Collection Time: 01/23/20 11:14 AM   Result Value Ref Range    Folate -Folic Acid 5.6 >4.0 ng/mL   LDH    Collection Time: 01/23/20 11:14 AM   Result Value Ref Range    LDH Total 68 (L) 107 - 266 U/L   RETICULOCYTES COUNT    Collection Time: 01/23/20 11:14 AM   Result Value Ref Range    Reticulocyte Count 3.1 (H) 0.8 - 2.1 %    Retic, Absolute 0.09 (H) 0.04 - 0.06 M/uL    Imm. Reticulocyte Fraction 21.2 (H) 9.3 - 17.4 %    Retic Hgb Equivalent 31.6 29.0 - 35.0 pg/cell   ESTIMATED GFR    Collection Time: 01/23/20 11:14 AM   Result Value Ref Range    GFR If  17 (A) >60 mL/min/1.73 m 2    GFR If Non  14 (A) >60 mL/min/1.73 m 2   PERIPHERAL SMEAR REVIEW    Collection Time: 01/23/20 11:14 AM   Result Value Ref Range     Peripheral Smear Review see below    IMMATURE PLT FRACTION    Collection Time: 01/23/20 11:14 AM   Result Value Ref Range    Imm. Plt Fraction 6.1 0.6 - 13.1 K/uL   DIFFERENTIAL COMMENT    Collection Time: 01/23/20 11:14 AM   Result Value Ref Range    Comments-Diff see below    CRYOPRECIPITATE    Collection Time: 01/23/20  1:48 PM   Result Value Ref Range    Component Ct       CTP                 Cryoprecipitate     [id number]   transfused   01/23/20   15:02      Product Type CTP     Dispense Status transfused     Unit Number (Barcoded) [id number]     Product Code (Barcoded) E9921J85     Blood Type (Barcoded) 5100     Component Ct       CTP                 Cryoprecipitate     [id number]   transfused   01/23/20   17:46      Product Type CTP     Dispense Status transfused     Unit Number (Barcoded) [id number]     Product Code (Barcoded) H0208I53     Blood Type (Barcoded) 5100    Histology Request    Collection Time: 01/23/20  3:35 PM   Result Value Ref Range    Pathology Request Sent to Histo    ACCU-CHEK GLUCOSE    Collection Time: 01/23/20  7:09 PM   Result Value Ref Range    Glucose - Accu-Ck 75 65 - 99 mg/dL   DIAGNOSTIC ALCOHOL    Collection Time: 01/23/20  8:26 PM   Result Value Ref Range    Diagnostic Alcohol 0.00 0.00 g/dL   Comp Metabolic Panel    Collection Time: 01/23/20  8:26 PM   Result Value Ref Range    Sodium 137 135 - 145 mmol/L    Potassium 4.7 3.6 - 5.5 mmol/L    Chloride 110 96 - 112 mmol/L    Co2 15 (L) 20 - 33 mmol/L    Anion Gap 12.0 (H) 0.0 - 11.9    Glucose 87 65 - 99 mg/dL    Bun 79 (HH) 8 - 22 mg/dL    Creatinine 4.21 (H) 0.50 - 1.40 mg/dL    Calcium 7.6 (L) 8.5 - 10.5 mg/dL    AST(SGOT) 8 (L) 12 - 45 U/L    ALT(SGPT) 5 2 - 50 U/L    Alkaline Phosphatase 52 30 - 99 U/L    Total Bilirubin 0.8 0.1 - 1.5 mg/dL    Albumin 2.6 (L) 3.2 - 4.9 g/dL    Total Protein 4.9 (L) 6.0 - 8.2 g/dL    Globulin 2.3 1.9 - 3.5 g/dL    A-G Ratio 1.1 g/dL   CBC WITH DIFFERENTIAL    Collection Time:  01/23/20  8:26 PM   Result Value Ref Range    WBC 8.1 4.8 - 10.8 K/uL    RBC 2.87 (L) 4.70 - 6.10 M/uL    Hemoglobin 8.4 (L) 14.0 - 18.0 g/dL    Hematocrit 25.9 (L) 42.0 - 52.0 %    MCV 90.2 81.4 - 97.8 fL    MCH 29.3 27.0 - 33.0 pg    MCHC 32.4 (L) 33.7 - 35.3 g/dL    RDW 48.6 35.9 - 50.0 fL    Platelet Count 47 (LL) 164 - 446 K/uL    MPV 10.8 9.0 - 12.9 fL    Neutrophils-Polys 89.60 (H) 44.00 - 72.00 %    Lymphocytes 4.70 (L) 22.00 - 41.00 %    Monocytes 4.80 0.00 - 13.40 %    Eosinophils 0.20 0.00 - 6.90 %    Basophils 0.00 0.00 - 1.80 %    Immature Granulocytes 0.70 0.00 - 0.90 %    Nucleated RBC 0.00 /100 WBC    Neutrophils (Absolute) 7.28 1.82 - 7.42 K/uL    Lymphs (Absolute) 0.38 (L) 1.00 - 4.80 K/uL    Monos (Absolute) 0.39 0.00 - 0.85 K/uL    Eos (Absolute) 0.02 0.00 - 0.51 K/uL    Baso (Absolute) 0.00 0.00 - 0.12 K/uL    Immature Granulocytes (abs) 0.06 0.00 - 0.11 K/uL    NRBC (Absolute) 0.00 K/uL   ESTIMATED GFR    Collection Time: 01/23/20  8:26 PM   Result Value Ref Range    GFR If  16 (A) >60 mL/min/1.73 m 2    GFR If Non  14 (A) >60 mL/min/1.73 m 2   HEPATITIS PANEL ACUTE(4 COMPONENTS)    Collection Time: 01/23/20  8:45 PM   Result Value Ref Range    Hepatitis B Surface Antigen Negative Negative    Hepatitis B Cors Ab,IgM Negative Negative    Hepatitis A Virus Ab, IgM Negative Negative    Hepatitis C Antibody Negative Negative   ACCU-CHEK GLUCOSE    Collection Time: 01/23/20  9:55 PM   Result Value Ref Range    Glucose - Accu-Ck 75 65 - 99 mg/dL   CBC WITH DIFFERENTIAL    Collection Time: 01/24/20 12:10 AM   Result Value Ref Range    WBC 7.3 4.8 - 10.8 K/uL    RBC 2.80 (L) 4.70 - 6.10 M/uL    Hemoglobin 8.3 (L) 14.0 - 18.0 g/dL    Hematocrit 25.7 (L) 42.0 - 52.0 %    MCV 91.8 81.4 - 97.8 fL    MCH 29.6 27.0 - 33.0 pg    MCHC 32.3 (L) 33.7 - 35.3 g/dL    RDW 49.6 35.9 - 50.0 fL    Platelet Count 40 (LL) 164 - 446 K/uL    MPV 11.4 9.0 - 12.9 fL    Neutrophils-Polys 88.70  (H) 44.00 - 72.00 %    Lymphocytes 6.30 (L) 22.00 - 41.00 %    Monocytes 4.40 0.00 - 13.40 %    Eosinophils 0.10 0.00 - 6.90 %    Basophils 0.10 0.00 - 1.80 %    Immature Granulocytes 0.40 0.00 - 0.90 %    Nucleated RBC 0.00 /100 WBC    Neutrophils (Absolute) 6.42 1.82 - 7.42 K/uL    Lymphs (Absolute) 0.46 (L) 1.00 - 4.80 K/uL    Monos (Absolute) 0.32 0.00 - 0.85 K/uL    Eos (Absolute) 0.01 0.00 - 0.51 K/uL    Baso (Absolute) 0.01 0.00 - 0.12 K/uL    Immature Granulocytes (abs) 0.03 0.00 - 0.11 K/uL    NRBC (Absolute) 0.00 K/uL   ACCU-CHEK GLUCOSE    Collection Time: 01/24/20  2:13 AM   Result Value Ref Range    Glucose - Accu-Ck 59 (L) 65 - 99 mg/dL   ACCU-CHEK GLUCOSE    Collection Time: 01/24/20  2:53 AM   Result Value Ref Range    Glucose - Accu-Ck 116 (H) 65 - 99 mg/dL   ACCU-CHEK GLUCOSE    Collection Time: 01/24/20  3:59 AM   Result Value Ref Range    Glucose - Accu-Ck 100 (H) 65 - 99 mg/dL   CBC WITH DIFFERENTIAL    Collection Time: 01/24/20  4:00 AM   Result Value Ref Range    WBC 5.8 4.8 - 10.8 K/uL    RBC 2.78 (L) 4.70 - 6.10 M/uL    Hemoglobin 7.9 (L) 14.0 - 18.0 g/dL    Hematocrit 25.8 (L) 42.0 - 52.0 %    MCV 92.8 81.4 - 97.8 fL    MCH 28.4 27.0 - 33.0 pg    MCHC 30.6 (L) 33.7 - 35.3 g/dL    RDW 49.8 35.9 - 50.0 fL    Platelet Count 35 (LL) 164 - 446 K/uL    MPV 11.3 9.0 - 12.9 fL    Neutrophils-Polys 88.70 (H) 44.00 - 72.00 %    Lymphocytes 5.20 (L) 22.00 - 41.00 %    Monocytes 5.20 0.00 - 13.40 %    Eosinophils 0.00 0.00 - 6.90 %    Basophils 0.20 0.00 - 1.80 %    Immature Granulocytes 0.70 0.00 - 0.90 %    Nucleated RBC 0.00 /100 WBC    Neutrophils (Absolute) 5.14 1.82 - 7.42 K/uL    Lymphs (Absolute) 0.30 (L) 1.00 - 4.80 K/uL    Monos (Absolute) 0.30 0.00 - 0.85 K/uL    Eos (Absolute) 0.00 0.00 - 0.51 K/uL    Baso (Absolute) 0.01 0.00 - 0.12 K/uL    Immature Granulocytes (abs) 0.04 0.00 - 0.11 K/uL    NRBC (Absolute) 0.00 K/uL   Comp Metabolic Panel    Collection Time: 01/24/20  4:00 AM   Result  Value Ref Range    Sodium 136 135 - 145 mmol/L    Potassium 4.6 3.6 - 5.5 mmol/L    Chloride 111 96 - 112 mmol/L    Co2 15 (L) 20 - 33 mmol/L    Anion Gap 10.0 0.0 - 11.9    Glucose 116 (H) 65 - 99 mg/dL    Bun 84 (HH) 8 - 22 mg/dL    Creatinine 4.39 (H) 0.50 - 1.40 mg/dL    Calcium 7.5 (L) 8.5 - 10.5 mg/dL    AST(SGOT) 10 (L) 12 - 45 U/L    ALT(SGPT) 5 2 - 50 U/L    Alkaline Phosphatase 53 30 - 99 U/L    Total Bilirubin 0.5 0.1 - 1.5 mg/dL    Albumin 2.5 (L) 3.2 - 4.9 g/dL    Total Protein 4.9 (L) 6.0 - 8.2 g/dL    Globulin 2.4 1.9 - 3.5 g/dL    A-G Ratio 1.0 g/dL   ESTIMATED GFR    Collection Time: 01/24/20  4:00 AM   Result Value Ref Range    GFR If  16 (A) >60 mL/min/1.73 m 2    GFR If Non  13 (A) >60 mL/min/1.73 m 2   MAGNESIUM    Collection Time: 01/24/20  4:00 AM   Result Value Ref Range    Magnesium 2.1 1.5 - 2.5 mg/dL   PHOSPHORUS    Collection Time: 01/24/20  4:00 AM   Result Value Ref Range    Phosphorus 6.4 (H) 2.5 - 4.5 mg/dL   ACCU-CHEK GLUCOSE    Collection Time: 01/24/20  8:32 AM   Result Value Ref Range    Glucose - Accu-Ck 70 65 - 99 mg/dL   CBC WITH DIFFERENTIAL    Collection Time: 01/24/20  8:34 AM   Result Value Ref Range    WBC 4.9 4.8 - 10.8 K/uL    RBC 2.79 (L) 4.70 - 6.10 M/uL    Hemoglobin 8.2 (L) 14.0 - 18.0 g/dL    Hematocrit 25.1 (L) 42.0 - 52.0 %    MCV 90.0 81.4 - 97.8 fL    MCH 29.4 27.0 - 33.0 pg    MCHC 32.7 (L) 33.7 - 35.3 g/dL    RDW 49.2 35.9 - 50.0 fL    Platelet Count 30 (LL) 164 - 446 K/uL    MPV 11.9 9.0 - 12.9 fL    Neutrophils-Polys 87.60 (H) 44.00 - 72.00 %    Lymphocytes 5.50 (L) 22.00 - 41.00 %    Monocytes 5.90 0.00 - 13.40 %    Eosinophils 0.20 0.00 - 6.90 %    Basophils 0.00 0.00 - 1.80 %    Immature Granulocytes 0.80 0.00 - 0.90 %    Nucleated RBC 0.00 /100 WBC    Neutrophils (Absolute) 4.29 1.82 - 7.42 K/uL    Lymphs (Absolute) 0.27 (L) 1.00 - 4.80 K/uL    Monos (Absolute) 0.29 0.00 - 0.85 K/uL    Eos (Absolute) 0.01 0.00 - 0.51 K/uL     Baso (Absolute) 0.00 0.00 - 0.12 K/uL    Immature Granulocytes (abs) 0.04 0.00 - 0.11 K/uL    NRBC (Absolute) 0.00 K/uL       Diagnostic imaging:  CT scan:   IMPRESSION:     1.  New anasarca with medium-large pleural effusions, ascites and diffuse fat stranding making detection of inflammation insensitive     2.  Ascites is low density suggesting it is not hemorrhagic     3.  Stable long segmental distal colonic wall thickening without bowel obstruction. This suggests chronic inflammation of infectious favored over inflammatory causes.     4.  4.1 cm ascending aortic aneurysm     5.  Severe coronary artery disease, aortic valve calcification indicating stenosis, left hepatic cyst and/or hemangioma and remote granulomatous disease as before    Assessment and Recommendations:  - Profound thrombocytopenia - Etiology unknown. No DIC with N fibrinogen level , Peripheral smear no schistocytes, N LDH, N bili, N KAYLEE, no evidence of TTP. Likely primary BM condition. BMBX done on 1/23/20 pending results     - Anemia - multifactorial  - decrease due to acute GI bleed and also hematuria. He is uremic with acute renal failure and has dysfunctional platelets that could resulted in acute bleed. Continue transfusion both blood and PLT, Keep HGB > 7.5, PLT > 40,000 with bleeding. I did discuss with ICU team and recommended nephrology consult      - Acute renal failure on CRI - uremic   -  GI bleed /Ascites /Colonic wall thickening - GI consulted   -  bleed - hematuria   - Weight loss   - Failure to thrive   - Anasarca     Plan:   He is critically ill. No family bedside to update his condition which is critically ill. Left message for his wife to call.   - Awaiting BMBx results  - Platelet dysfunction from uremia could also be contributing to bleeding. If he continues to bleed DDAVP is advised . - Nephrology consult recommended.   - Continue supportive measures with transfusions as advised above   - Will continue to follow   -  Spent 40 minutes     Received a call from resident that patient decided comfort care which is a reasonable choice       High complexicity/Drug monitoring     We will continue to follow with you; please call with any questions, [phone].      Sabine Bates MD  [facility]   [phone]

## 2020-01-24 NOTE — PROGRESS NOTES
Pt called RN into room, became unresponsive. Sternal rub performed, pt back in previous rhythm.  Dr Brunner notified.

## 2020-01-24 NOTE — PROGRESS NOTES
Patient admitted to [facility] from ER with personal belongings.  (clothing, shoes and cell phone and  cord)  Titrating levophed gtt to keep MAP >65.  2 RN skin check completed.  redness noted to coccyx and bilateral heels.  Both blanching.  Will place protective mepilex and float heels.

## 2020-01-24 NOTE — PROGRESS NOTES
"Critical Care Progress Note    Date of admission  1/22/2020    Chief Complaint  bleeding    Hospital Course    84 y.o. male with hx of CKD (baseline cr 1.7), CAD, COPD, who presented from [facility] for progressive weakness and anemia, 30-40lb weight loss, melena, \"pain all over\" At ED, pt was found to have Hgb 8.5 which was a drop from 11 two weeks ago.  Pt is alert, oriented. MAP >65, SBP in 90s, HR in 60s, afebrile with Tm 36.3.  WBC 3.5, plt 27K, PMN 70% and 20% lymph.   Lactate 1.2, procal 0.83  Creatinine 4.24 and noted hematuria in the sousa bag.   CT A/P noted large right side pleural effusion and moderate size of ascites. + stable colonic wall thickening  Pt started on ceftriaxone and azithromcyin   Pt has no known hx of cirrhosis, non drinker, non smoker. He's a retired  and lives with wife. Pt denies any trauma.   TTE with EF 65%, mild LVH, dilated RV and good function. Moderate AS, TR, mild MR  Small pericardial effusion without evidence of diastolic collapse.   US kidney with borderline hydronephrosis      Interval Problem Update  Reviewed last 24 hour events:  Remains critically ill  Hgb was stable   HR in 80s  SBP in 1001-20s  4L NC.   Creatinine 4.39. Sodium 136    Review of Systems  Review of Systems   Constitutional: Negative for chills, fever and malaise/fatigue.   HENT: Negative for congestion.    Respiratory: Positive for shortness of breath. Negative for cough and sputum production.    Cardiovascular: Negative for chest pain, palpitations and leg swelling.   Gastrointestinal: Positive for abdominal pain. Negative for diarrhea, nausea and vomiting.   Genitourinary: Negative for dysuria.   Musculoskeletal: Negative for back pain.   Skin: Negative for rash.   Neurological: Negative for weakness and headaches.   Psychiatric/Behavioral: The patient is not nervous/anxious.         Vital Signs for last 24 hours   Temp:  [35.4 °C (95.7 °F)-36.3 °C (97.4 °F)] 35.9 °C (96.7 °F)  Pulse:  " [] 76  Resp:  [12-48] 24  BP: ()/(37-98) 119/72  SpO2:  [88 %-100 %] 98 %    Hemodynamic parameters for last 24 hours       Respiratory Information for the last 24 hours       Physical Exam   Physical Exam  Vitals signs and nursing note reviewed.   Constitutional:       General: He is not in acute distress.     Appearance: He is not ill-appearing, toxic-appearing or diaphoretic.   HENT:      Head: Normocephalic and atraumatic.      Mouth/Throat:      Mouth: Mucous membranes are moist.   Neck:      Musculoskeletal: Neck supple.   Cardiovascular:      Rate and Rhythm: Normal rate and regular rhythm.      Pulses: Normal pulses.      Heart sounds: Normal heart sounds. No murmur.   Pulmonary:      Effort: Pulmonary effort is normal. No respiratory distress.      Breath sounds: Normal breath sounds. No wheezing, rhonchi or rales.      Comments: Diminished on right side  Diminsihed at bases R >L  Abdominal:      General: Bowel sounds are normal. There is no distension.      Palpations: Abdomen is soft. There is no mass.      Tenderness: There is no tenderness.      Hernia: No hernia is present.   Musculoskeletal:         General: No swelling or tenderness.   Skin:     General: Skin is warm.      Coloration: Skin is not jaundiced.   Neurological:      General: No focal deficit present.      Mental Status: He is alert.      Cranial Nerves: No cranial nerve deficit.      Comments: Moving all ext         Medications  Current Facility-Administered Medications   Medication Dose Route Frequency Provider Last Rate Last Dose   • ipratropium-albuterol (DUONEB) nebulizer solution  3 mL Nebulization Q4H PRN (RT) Myke Dotson M.D.   3 mL at 01/24/20 0102   • senna-docusate (PERICOLACE or SENOKOT S) 8.6-50 MG per tablet 2 Tab  2 Tab Oral BID Kevin Dick M.D.   Stopped at 01/23/20 1800    And   • polyethylene glycol/lytes (MIRALAX) PACKET 1 Packet  1 Packet Oral QDAY PRN Kevin Dick M.D.        And   •  magnesium hydroxide (MILK OF MAGNESIA) suspension 30 mL  30 mL Oral QDAY PRN Kevin Dick M.D.        And   • bisacodyl (DULCOLAX) suppository 10 mg  10 mg Rectal QDAY PRN Kevin Dick M.D.       • Respiratory Care per Protocol   Nebulization Continuous RT Kevin Dick M.D.       • ondansetron (ZOFRAN) syringe/vial injection 4 mg  4 mg Intravenous Q4HRS PRN Kevin Dick M.D.   4 mg at 01/23/20 1616   • ondansetron (ZOFRAN ODT) dispertab 4 mg  4 mg Oral Q4HRS PRN Kevin Dick M.D.       • pantoprazole (PROTONIX) injection 40 mg  40 mg Intravenous BID Kevin Dick M.D.   40 mg at 01/24/20 0616   • albuterol inhaler 2 Puff  2 Puff Inhalation Q4HRS PRN Kevin Dick M.D.   2 Puff at 01/23/20 2300   • budesonide-formoterol (SYMBICORT) 80-4.5 MCG/ACT inhaler 2 Puff  2 Puff Inhalation BID Kevin Dick M.D.   2 Puff at 01/24/20 0517   • diphenhydrAMINE (BENADRYL) tablet/capsule 50 mg  50 mg Oral QHS PRN Kevin Dick M.D.       • tamsulosin (FLOMAX) capsule 0.4 mg  0.4 mg Oral DAILY Kevin Dick M.D.   Stopped at 01/24/20 0600   • glucose 4 g chewable tablet 16 g  16 g Oral Q15 MIN PRN Debo Wade M.D.        And   • DEXTROSE 10% BOLUS 250 mL  250 mL Intravenous Q15 MIN PRN Debo Wade M.D.   250 mL at 01/24/20 0219   • cefTRIAXone (ROCEPHIN) 1 g in  mL IVPB  1 g Intravenous QHS Debo Wade M.D.   Stopped at 01/23/20 2052   • metroNIDAZOLE (FLAGYL) IVPB 500 mg  500 mg Intravenous Q8HRS Debo Wade M.D.   Stopped at 01/24/20 0613   • norepinephrine (LEVOPHED) 8 mg in  mL Infusion  0-30 mcg/min Intravenous Continuous Elena Escalante M.D. 2.8 mL/hr at 01/24/20 0340 1.5 mcg/min at 01/24/20 0340       Fluids    Intake/Output Summary (Last 24 hours) at 1/24/2020 0649  Last data filed at 1/24/2020 0613  Gross per 24 hour   Intake 1848.31 ml   Output 1750 ml   Net 98.31 ml       Laboratory          Recent Labs     01/23/20  1114 01/23/20 2026  "01/24/20  0400   SODIUM 137 137 136   POTASSIUM 4.6 4.7 4.6   CHLORIDE 111 110 111   CO2 15* 15* 15*   BUN 82* 79* 84*   CREATININE 4.18* 4.21* 4.39*   CALCIUM 7.4* 7.6* 7.5*     Recent Labs     01/22/20 2230 01/23/20 1114 01/23/20 2026 01/24/20  0400   ALTSGPT <5  --  <5 5 5   ASTSGOT 8*  --  8* 8* 10*   ALKPHOSPHAT 52  --  49 52 53   TBILIRUBIN 0.5  --  1.2 0.8 0.5   LIPASE 13  --   --   --   --    GLUCOSE 111*   < > 79 87 116*    < > = values in this interval not displayed.     Recent Labs     01/23/20 1114 01/23/20 2026 01/24/20  0010 01/24/20  0400   WBC 3.6* 8.1 7.3 5.8   NEUTSPOLYS 75.00* 89.60* 88.70* 88.70*   LYMPHOCYTES 15.60* 4.70* 6.30* 5.20*   MONOCYTES 7.50 4.80 4.40 5.20   EOSINOPHILS 0.80 0.20 0.10 0.00   BASOPHILS 0.30 0.00 0.10 0.20   ASTSGOT 8* 8*  --  10*   ALTSGPT <5 5  --  5   ALKPHOSPHAT 49 52  --  53   TBILIRUBIN 1.2 0.8  --  0.5     Recent Labs     01/23/20 1114 01/23/20 2026 01/24/20  0010 01/24/20  0400   RBC 2.93* 2.87* 2.80* 2.78*   HEMOGLOBIN 8.6* 8.4* 8.3* 7.9*   HEMATOCRIT 26.5* 25.9* 25.7* 25.8*   PLATELETCT 25* 47* 40* 35*   PROTHROMBTM 17.0*  --   --   --    APTT 47.5*  --   --   --    INR 1.35*  --   --   --    IRON 121  --   --   --    FERRITIN 166.2  --   --   --    TOTIRONBC 125*  --   --   --        Imaging  X-Ray:  I have personally reviewed the images and compared with prior images.    Assessment/Plan  * Melanotic stools  Assessment & Plan  On protonix 40 BID.   Close monitoring of CBC. H/H appears stable.   NG tube output without any evidence of upper GI bleed. No coffee ground or blood. Bilious output  Continue low suction     GI bleed  Assessment & Plan  UGI given hx of melena and \"hematemesis\"  GI consulted for need of EGD  Protonix 40 BID  Pt s/p 1U PRBC.   CBC Q4H  Transfuse if Hgb <8, plt <50k, or evidence of abnormal TEG  NG tube was placed and no coffee ground emesis. Only bilious  Hgb is stable at this point    Coagulopathy (HCC)  Assessment & Plan  TEG " with MA 29.   Transfused 2U platelet yesterday   CBC Q4H  Transfusion parameter: Hgb <8, platelet <50, fibrinogen <200, INR >2    Hematuria  Assessment & Plan  Presumed due to his severe coagulapathy, rather than anatomical. CT A/P didn't show any abnormality  CBI    Pancytopenia (HCC)  Assessment & Plan  Pancytopenia - query any underlying hematological diseaes  Hematology has been consulted and pt to have bone marrow biopsy.       Acute on chronic renal failure (HCC)  Assessment & Plan  DDx include ATN, intrinsic vs ?obstruciton   US renal with borderline hydronephrosis but no obvious obstruction noted in CT or US.   Monitor UOP closely  Nephrology was consulted for concern of uremia causing platelet dysfunction     Bilateral pleural effusion  Assessment & Plan  Will do thoracentesis given large right pleural effusion once coagulation is corrected.   Plan tomorrow. Will give 2U Platelets and cryo today  Send pleural fluid for analysis. LDH, protein, cell count, pH, routine gram stain/cx.       Other ascites  Assessment & Plan  Given ascites, suspect some underlying liver  Check acute hepatitis panel, alcohol level  Will tap ascites when coagulopathy is corrected and send for gs/culture, albumin, cell count and cytology        VTE:  Contraindicated  Ulcer: Not Indicated  Lines: Central Line  Ongoing indication addressed    I have performed a physical exam and reviewed and updated ROS and Plan today (1/24/2020). In review of yesterday's note (1/23/2020), there are no changes except as documented above.     Discussed patient condition and risk of morbidity and/or mortality with Hospitalist, RN, RT, Pharmacy and Patient     I have assessed and reassessed his respiratory status, blood pressure, hemodynamics, cardiovascular and neurological status.  He is at increased risk for worsening respiratory, cardiovascular and neurological system dysfunction.  He has worsening renal function resulted to platelet dysfunction. He  is at significant risk for requiring intubation mechanical ventilatory support.  High risk of deterioration and worsening vital organ dysfunction and death without the above critical care interventions.     The patient remains critically ill.  Critical care time = 80 minutes in directly providing and coordinating critical care and extensive data review.  No time overlap and excludes procedures.    Addendum  Pt later this afternoon decided for comfort care. Pt is competent to make his decisions and understand what comfort care entails. We spoke with wife on the phone. All questions were answered  We went ahead and started the comfort care measures.     Cipriano Franco D.O.

## 2020-01-24 NOTE — CONSULTS
DATE OF SERVICE:  01/23/2020    UROLOGY CONSULTATION    REQUESTING SERVICE:  [facility] Service.    CONSULTING SERVICE:  Urology.    CONSULTING PHYSICIAN:  Brent Macdonald MD    REASON FOR CONSULTATION:  Gross hematuria.    HISTORY OF PRESENT ILLNESS:  The patient is an 84-year-old gentleman admitted   overnight to the hospital.  Patient admitted to the hospital with   decompensated heart failure and GI bleed, generalized weakness, shortness of   breath and abdominal pain as well as tarry stools.  Patient had a catheter   placed in the emergency room and was noted to have hematuria.  In asking the   patient, he says he is having hematuria for several days per his report   although he is not totally clear about that.  The patient has seen Dr. Mckeon   in the past for BPH and has been managed with Flomax for that.  Patient   denies prior episodes of gross hematuria or difficulty voiding recently.    PAST MEDICAL HISTORY:  Significant for aortic stenosis, BPH, shortness of   breath, edema, anemia, pulmonary hypertension, history of renal insufficiency   and incontinence.    PAST SURGICAL HISTORY:  Endoscopy, EGD on 10/24/2019, colonoscopy, inguinal   hernia repair and anal cyst excision.    ALLERGIES:  No known drug allergies.    MEDICATIONS ON ADMISSION:  Please see EMR for complete list.    REVIEW OF SYSTEMS:  See HPI, otherwise complete review of systems is negative.    PHYSICAL EXAMINATION:  VITAL SIGNS:  Show a temperature of 36, pulse 89, blood pressure 117/83,   respiratory rate 27 and saturations 100%.  GENERAL:  Patient is alert, in no acute distress.  He has a nasogastric tube   in place draining bilious fluid.  Patient's breathing is nonlabored.  Pulse is   regular.  ABDOMEN:  Soft, nontender, nondistended.  GENITOURINARY:  Shows an uncircumcised phallus with phimosis.  Fox catheter   is in place draining darkly bloody urine.  Bilaterally, testes are down, no   masses.  EXTREMITIES:  Patient moves all  extremities normally.  NEUROLOGIC:  Grossly intact.    LABORATORY DATA:  Shows a white blood cell count of 3.6, hemoglobin 8.6,   hematocrit 26% and platelets of 25,000.  Sodium 137, potassium 4.6, chloride   111, bicarb 15, BUN 82 and creatinine 4.18 with a glucose of 79.    IMAGING:  CT of the abdomen and pelvis without contrast yesterday shows normal   kidneys with no hydronephrosis or stones, large amount of ascites in the   abdomen, BPH, anasarca.    PROCEDURE NOTE:  Under sterile conditions, I removed his 16-French Fox   catheter that was in place and placed a 22-French 3-way Fox catheter, filled   the balloon with 30 mL sterile water, hand irrigated the catheter to clear   and started continuous bladder irrigation.    ASSESSMENT AND PLAN:  This is an 84-year-old gentleman with history of benign   prostatic hypertrophy, now with pancytopenia and gastrointestinal bleed as   well as hematuria.  Recommendations are to continue the continuous bladder   irrigation overnight and titrate the output to light pink, hand irrigate as   needed as well.  Patient will need eventual cystoscopy once the hematuria has   resolved and most likely as an outpatient.       ____________________________________     MD CLAIR Howell / MAU    DD:  01/23/2020 18:54:39  DT:  01/23/2020 21:21:40    D#:  [id number]  Job#:  [id number]

## 2020-01-24 NOTE — HEART FAILURE PROGRAM
Cardiovascular Nurse Navigator () Advanced Heart Failure Program Inpatient Progress Note:    Patient who has seen general cardiology twice with no HF diagnosis. Admitted as a transfer from [facility]: melanotic stools, GI bleed, Coagulopathy, Hematuria, Pancytopenia and AOC renal failure    Please note that patient was diagnosed with HF by Dr. Escalante on 1/23 although the MD in this note notes that the Echo shows no major structural defects and states further that volume overload state is diue to poor pump function. We will need further clarification. Regardless, even if the patient does have HF, it is clearly not his primary issue at this time.    HF would be a new diagnosis warranting a cardiology consult.    If any education is provided to patient and family, please specify that heart failure is a working diagnosis until MD has confirmed diagnosis and has discussed it with the patient.     1. Attending provider clearly state in note that HF is ruled out. If this is not done, patient will still code for heart failure.    2. Remove HF from the problems list so that staff are not confused about necessary interventions and patient does not receive education on a diagnosis he does not have.    Please consult cardiology as is expected for all new HF diagnoses.     Thank you, [place], Cardiovascular Nurse Navigator, RN, [facility] x2

## 2020-01-24 NOTE — DIETARY
Nutrition Services: Day 1 of admit. Shawanda Daley Jr. is a 84 y.o. male with admitting DX of SHIRA (acute kidney injury), acute GI bleeding.  Consult received for failure to thrive.     UBW is 68.2 kg (150 lb) x > 6 months per pt verbalization. Pt denies recent weight loss. PTA pt reports consuming 1/4 of usual PO intake x 6 months. At this time, pt stated his appetite is low.     Assessment:  Ht: 177.8 cm, Wt: 72.6 kg (160 lb) via stated, BMI: 22.96 - normal  Requested measured weight from RN.  Diet: NPO    Evaluation:   1. PMHx: CKD, COPD  2. Per H&P: 30-40 lb weight loss in past year.  3. 1+ bilateral lower extremity edema per flow sheets.     4. NGT placed for LIS.   5. Labs: glucose 116, BUN 84, creatinine 4.39, calcium 7.5, phosphorus 6.4  6. Meds: levophed @ 5 mcg/min, D10  7. Hip incision noted in flow sheets. Wound team consult pending.     Malnutrition Risk: Pt with severe chronic illness related malnutrition secondary to failure to thrive, COPD as evidenced by consuming 1/4 of usual PO intake x 6 months per pt verbalization and severe fat loss and severe muscle loss (pronounced hollowness of temples, depression in cheeks, sunken eyes, prominent brow bone, squaring of shoulders).    Recommendations/Plan:  1. Advance diet when medically feasible. Recommend nutrition support if plan of care involves prolonged NPO status.    2. Will await wound staging to make recommendations if appropriate. RD will monitor per dept policy.  3. Monitor weight.    RD following.

## 2020-01-24 NOTE — CARE PLAN
Problem: Nutritional:  Goal: Achieve adequate nutritional intake  Description  Diet > NPO and patient will consume 50-75% of meals.   Outcome: NOT MET

## 2020-01-24 NOTE — PROGRESS NOTES
"Patient is becoming increasingly more agitated and restless. Patient calling out for help and removing clothing and pulling on NG tube, urinary catheter, and oxygen. Patient voices again that he no longer wants to continue with treatment and he \"wants to die\". Patient is A&Ox4. MD paged.  "

## 2020-01-24 NOTE — CONSULTS
[facility] -  CONSULTATION NOTE               Author: Ana Mendez D.O. Date & Time: 1/24/2020  1:34 PM       REASON FOR CONSULTATION:   - SHIRA    CHIEF COMPLAINT:   -  Weakness, weight loss, melana    HISTORY OF PRESENT ILLNESS:    83yo male admitted with c/o weakness, 30lbs unintentional weight loss, and melana, seen for SHIRA. Patient initially presented to [facility] 2 days ago with c/o 2 weeks of progressive weakness, black tarry stool x 1 month, 30-40lbs unintentional weight loss over the last year, as well as cough productive of yellow sputum and generalized pain and significant abdominal pain with associated nausea. No vomiting. He was subsequently transferred to [facility] ED and admitted.     He denies know h/o CKD. Baseline SCr unclear, will values prior to 2018 ~0.8-1.1, 1.1-1.3 in 2018, 1.8-2.3 in 8/2019-10/2019, and 2.8-3.3 12/2019, and 4-4.4 this admission. He denies previously seeing nephrology, but does report prior urologic care. No DM, possible h/o HTN, though not on any outpatient meds for HTN. Patient is a fairly poor historian, most of the history is obtained from chart review and discussion with ICU staff. He does have h/o BPH, and reports recent hematuria. It is unclear if this was prior to presentation to [facility], or associated with a traumatic sousa insertion. He has been placed on CBI, urology following.     At [facility], he was found to have hgb 9, plts 27, hemoccult positive. SCr 4.4. BNP 24,000. Glucose 44. He was given D50 and transferred to [facility]    He has been seen and evaluated by urology and placed on CBI; hematology and BM biopsy has been obtained; gastroenterology, considering paracentesis; and critical care. He has an NGT to suction, minimal output, planning to DC suction and allow him to eat. Etiology of pancytopenia is unclear, no schistocytes seen, not though to be TTP of DIC. Note abnormal SPEP 12/2019.    He denies significant NSAIDs use, unusual supplements, or  recent contrast exposure. He has no h/o excessive EtOH use or cirrhosis. He denies knowledge of any prior hematologic/oncologic issues. He has noted increasing BLE edema x 1 year. Denies urinary c/o except hematuria. He c/o diarrhea for at least 1 month    Significant hypotension throughout hospitalization, SBP labile 60s-160s, though generally 80s-110s.          REVIEW OF SYSTEMS:    Review of Systems   Constitutional: Positive for malaise/fatigue and weight loss. Negative for chills and fever.   HENT: Negative for congestion and sore throat.    Eyes: Negative for pain and discharge.   Cardiovascular: Positive for leg swelling. Negative for chest pain.   Gastrointestinal: Positive for abdominal pain, diarrhea and nausea. Negative for vomiting.   Genitourinary: Positive for hematuria. Negative for dysuria, flank pain, frequency and urgency.   Musculoskeletal: Positive for myalgias. Negative for back pain.   Skin: Negative for itching and rash.   Neurological: Positive for weakness. Negative for focal weakness, seizures and headaches.   Endo/Heme/Allergies: Negative for polydipsia. Does not bruise/bleed easily.   Psychiatric/Behavioral: Negative for substance abuse. The patient is nervous/anxious.          PAST MEDICAL HISTORY:   BPH  ?HTN  Cataracts  Anemia  Indigestion  B12 deficiency  Nephrolithiasis  Urinary incontinence  AS  Anal cyst      PAST SURGICAL HISTORY:   EGD  ?Esophageal dilatation  Colonoscopy  Inguinal hernia repair  Anal cyst excision    FAMILY HISTORY:   - No renal disease  Father: CAD, CVA, colon cancer  Brother - lung cancer    SOCIAL HISTORY:   - No tobacco  - No EtOH  - No illicits    HOME MEDICATIONS:   - Reviewed and documented in chart    Inpatient medications reviewed    Current Facility-Administered Medications:   •  ipratropium-albuterol (DUONEB) nebulizer solution, 3 mL, Nebulization, Q4H PRN (RT), Myke Dotson M.D., 3 mL at 01/24/20 0102  •  midodrine (PROAMATINE) tablet 2.5 mg,  2.5 mg, Oral, Q8HRS, Ana Mendez D.O.  •  senna-docusate (PERICOLACE or SENOKOT S) 8.6-50 MG per tablet 2 Tab, 2 Tab, Oral, BID, Stopped at 01/23/20 1800 **AND** polyethylene glycol/lytes (MIRALAX) PACKET 1 Packet, 1 Packet, Oral, QDAY PRN **AND** magnesium hydroxide (MILK OF MAGNESIA) suspension 30 mL, 30 mL, Oral, QDAY PRN **AND** bisacodyl (DULCOLAX) suppository 10 mg, 10 mg, Rectal, QDAY PRN, Kevin Dick M.D.  •  Respiratory Care per Protocol, , Nebulization, Continuous RT, Kevin Dick M.D.  •  ondansetron (ZOFRAN) syringe/vial injection 4 mg, 4 mg, Intravenous, Q4HRS PRN, Kevin Dick M.D., 4 mg at 01/23/20 1616  •  ondansetron (ZOFRAN ODT) dispertab 4 mg, 4 mg, Oral, Q4HRS PRN, Kevin Dick M.D.  •  pantoprazole (PROTONIX) injection 40 mg, 40 mg, Intravenous, BID, Kevin Dick M.D., 40 mg at 01/24/20 0616  •  albuterol inhaler 2 Puff, 2 Puff, Inhalation, Q4HRS PRN, Kevin Dick M.D., 2 Puff at 01/23/20 2300  •  budesonide-formoterol (SYMBICORT) 80-4.5 MCG/ACT inhaler 2 Puff, 2 Puff, Inhalation, BID, Kevin Dick M.D., 2 Puff at 01/24/20 0517  •  tamsulosin (FLOMAX) capsule 0.4 mg, 0.4 mg, Oral, DAILY, Kevin Dick M.D., Stopped at 01/24/20 0600  •  Notify, , , Once **AND** glucose 4 g chewable tablet 16 g, 16 g, Oral, Q15 MIN PRN **AND** DEXTROSE 10% BOLUS 250 mL, 250 mL, Intravenous, Q15 MIN PRN, Debo Wade M.D., 250 mL at 01/24/20 0838  •  cefTRIAXone (ROCEPHIN) 1 g in  mL IVPB, 1 g, Intravenous, QHS, Debo Wade M.D., Stopped at 01/23/20 2052  •  metroNIDAZOLE (FLAGYL) IVPB 500 mg, 500 mg, Intravenous, Q8HRS, Debo Wade M.D., Stopped at 01/24/20 0613  •  norepinephrine (LEVOPHED) 8 mg in  mL Infusion, 0-30 mcg/min, Intravenous, Continuous, Elena Escalante M.D., Last Rate: 9.4 mL/hr at 01/24/20 0851, 5 mcg/min at 01/24/20 0851      LABORATORY STUDIES:   - Reviewed and documented in chart    ALLERGIES:  Patient has no known  "allergies.    VS:  BP (P) 122/70   Pulse (P) 89   Temp (P) 35.8 °C (96.5 °F) (Temporal)   Resp (!) (P) 25   Ht 1.778 m (5' 10\")   Wt 72.6 kg (160 lb)   SpO2 96%   BMI 22.96 kg/m²   PHYSICAL EXAM(2,8)  VITAL SIGNS: /76   Pulse 85   Temp 35.8 °C (96.5 °F) (Temporal)   Resp 13   Ht 1.778 m (5' 10\")   Wt 72.6 kg (160 lb)   SpO2 92%   BMI 22.96 kg/m²      Constitutional: Well developed, thin, ill appearing  HENT: Normocephalic, atraumatic, bilateral external ears normal, NGT in place  Eyes: PER, EOMI  Cardiovascular: RRR, +dependent edema.  Respiratory: Diminished in bases, no wheeze.  Gastrointestinal: soft, NT  Skin: No erythema, no rash.  Genitourinary: No costovertebral angle tenderness.  Neurologic: Alert & oriented x 3, cranial nerves 2-12 intact by passive exam. No focal deficit noted.  Psychiatric: Affect normal, Mood normal.      LABS:  Recent Results (from the past 24 hour(s))   CRYOPRECIPITATE    Collection Time: 01/23/20  1:48 PM   Result Value Ref Range    Component Ct       CTP                 Cryoprecipitate     [id number]   transfused   01/23/20   15:02      Product Type CTP     Dispense Status transfused     Unit Number (Barcoded) [id number]     Product Code (Barcoded) O3610Z01     Blood Type (Barcoded) 5100     Component Ct       CTP                 Cryoprecipitate     [id number]   transfused   01/23/20   17:46      Product Type CTP     Dispense Status transfused     Unit Number (Barcoded) [id number]     Product Code (Barcoded) O5525B35     Blood Type (Barcoded) 5100    Histology Request    Collection Time: 01/23/20  3:35 PM   Result Value Ref Range    Pathology Request Sent to Histo    ACCU-CHEK GLUCOSE    Collection Time: 01/23/20  7:09 PM   Result Value Ref Range    Glucose - Accu-Ck 75 65 - 99 mg/dL   DIAGNOSTIC ALCOHOL    Collection Time: 01/23/20  8:26 PM   Result Value Ref Range    Diagnostic Alcohol 0.00 0.00 g/dL   Comp Metabolic Panel    Collection Time: " 01/23/20  8:26 PM   Result Value Ref Range    Sodium 137 135 - 145 mmol/L    Potassium 4.7 3.6 - 5.5 mmol/L    Chloride 110 96 - 112 mmol/L    Co2 15 (L) 20 - 33 mmol/L    Anion Gap 12.0 (H) 0.0 - 11.9    Glucose 87 65 - 99 mg/dL    Bun 79 (HH) 8 - 22 mg/dL    Creatinine 4.21 (H) 0.50 - 1.40 mg/dL    Calcium 7.6 (L) 8.5 - 10.5 mg/dL    AST(SGOT) 8 (L) 12 - 45 U/L    ALT(SGPT) 5 2 - 50 U/L    Alkaline Phosphatase 52 30 - 99 U/L    Total Bilirubin 0.8 0.1 - 1.5 mg/dL    Albumin 2.6 (L) 3.2 - 4.9 g/dL    Total Protein 4.9 (L) 6.0 - 8.2 g/dL    Globulin 2.3 1.9 - 3.5 g/dL    A-G Ratio 1.1 g/dL   CBC WITH DIFFERENTIAL    Collection Time: 01/23/20  8:26 PM   Result Value Ref Range    WBC 8.1 4.8 - 10.8 K/uL    RBC 2.87 (L) 4.70 - 6.10 M/uL    Hemoglobin 8.4 (L) 14.0 - 18.0 g/dL    Hematocrit 25.9 (L) 42.0 - 52.0 %    MCV 90.2 81.4 - 97.8 fL    MCH 29.3 27.0 - 33.0 pg    MCHC 32.4 (L) 33.7 - 35.3 g/dL    RDW 48.6 35.9 - 50.0 fL    Platelet Count 47 (LL) 164 - 446 K/uL    MPV 10.8 9.0 - 12.9 fL    Neutrophils-Polys 89.60 (H) 44.00 - 72.00 %    Lymphocytes 4.70 (L) 22.00 - 41.00 %    Monocytes 4.80 0.00 - 13.40 %    Eosinophils 0.20 0.00 - 6.90 %    Basophils 0.00 0.00 - 1.80 %    Immature Granulocytes 0.70 0.00 - 0.90 %    Nucleated RBC 0.00 /100 WBC    Neutrophils (Absolute) 7.28 1.82 - 7.42 K/uL    Lymphs (Absolute) 0.38 (L) 1.00 - 4.80 K/uL    Monos (Absolute) 0.39 0.00 - 0.85 K/uL    Eos (Absolute) 0.02 0.00 - 0.51 K/uL    Baso (Absolute) 0.00 0.00 - 0.12 K/uL    Immature Granulocytes (abs) 0.06 0.00 - 0.11 K/uL    NRBC (Absolute) 0.00 K/uL   ESTIMATED GFR    Collection Time: 01/23/20  8:26 PM   Result Value Ref Range    GFR If  16 (A) >60 mL/min/1.73 m 2    GFR If Non  14 (A) >60 mL/min/1.73 m 2   HEPATITIS PANEL ACUTE(4 COMPONENTS)    Collection Time: 01/23/20  8:45 PM   Result Value Ref Range    Hepatitis B Surface Antigen Negative Negative    Hepatitis B Cors Ab,IgM Negative Negative     Hepatitis A Virus Ab, IgM Negative Negative    Hepatitis C Antibody Negative Negative   ACCU-CHEK GLUCOSE    Collection Time: 01/23/20  9:55 PM   Result Value Ref Range    Glucose - Accu-Ck 75 65 - 99 mg/dL   CBC WITH DIFFERENTIAL    Collection Time: 01/24/20 12:10 AM   Result Value Ref Range    WBC 7.3 4.8 - 10.8 K/uL    RBC 2.80 (L) 4.70 - 6.10 M/uL    Hemoglobin 8.3 (L) 14.0 - 18.0 g/dL    Hematocrit 25.7 (L) 42.0 - 52.0 %    MCV 91.8 81.4 - 97.8 fL    MCH 29.6 27.0 - 33.0 pg    MCHC 32.3 (L) 33.7 - 35.3 g/dL    RDW 49.6 35.9 - 50.0 fL    Platelet Count 40 (LL) 164 - 446 K/uL    MPV 11.4 9.0 - 12.9 fL    Neutrophils-Polys 88.70 (H) 44.00 - 72.00 %    Lymphocytes 6.30 (L) 22.00 - 41.00 %    Monocytes 4.40 0.00 - 13.40 %    Eosinophils 0.10 0.00 - 6.90 %    Basophils 0.10 0.00 - 1.80 %    Immature Granulocytes 0.40 0.00 - 0.90 %    Nucleated RBC 0.00 /100 WBC    Neutrophils (Absolute) 6.42 1.82 - 7.42 K/uL    Lymphs (Absolute) 0.46 (L) 1.00 - 4.80 K/uL    Monos (Absolute) 0.32 0.00 - 0.85 K/uL    Eos (Absolute) 0.01 0.00 - 0.51 K/uL    Baso (Absolute) 0.01 0.00 - 0.12 K/uL    Immature Granulocytes (abs) 0.03 0.00 - 0.11 K/uL    NRBC (Absolute) 0.00 K/uL   ACCU-CHEK GLUCOSE    Collection Time: 01/24/20  2:13 AM   Result Value Ref Range    Glucose - Accu-Ck 59 (L) 65 - 99 mg/dL   ACCU-CHEK GLUCOSE    Collection Time: 01/24/20  2:53 AM   Result Value Ref Range    Glucose - Accu-Ck 116 (H) 65 - 99 mg/dL   ACCU-CHEK GLUCOSE    Collection Time: 01/24/20  3:59 AM   Result Value Ref Range    Glucose - Accu-Ck 100 (H) 65 - 99 mg/dL   CBC WITH DIFFERENTIAL    Collection Time: 01/24/20  4:00 AM   Result Value Ref Range    WBC 5.8 4.8 - 10.8 K/uL    RBC 2.78 (L) 4.70 - 6.10 M/uL    Hemoglobin 7.9 (L) 14.0 - 18.0 g/dL    Hematocrit 25.8 (L) 42.0 - 52.0 %    MCV 92.8 81.4 - 97.8 fL    MCH 28.4 27.0 - 33.0 pg    MCHC 30.6 (L) 33.7 - 35.3 g/dL    RDW 49.8 35.9 - 50.0 fL    Platelet Count 35 (LL) 164 - 446 K/uL    MPV 11.3 9.0 -  12.9 fL    Neutrophils-Polys 88.70 (H) 44.00 - 72.00 %    Lymphocytes 5.20 (L) 22.00 - 41.00 %    Monocytes 5.20 0.00 - 13.40 %    Eosinophils 0.00 0.00 - 6.90 %    Basophils 0.20 0.00 - 1.80 %    Immature Granulocytes 0.70 0.00 - 0.90 %    Nucleated RBC 0.00 /100 WBC    Neutrophils (Absolute) 5.14 1.82 - 7.42 K/uL    Lymphs (Absolute) 0.30 (L) 1.00 - 4.80 K/uL    Monos (Absolute) 0.30 0.00 - 0.85 K/uL    Eos (Absolute) 0.00 0.00 - 0.51 K/uL    Baso (Absolute) 0.01 0.00 - 0.12 K/uL    Immature Granulocytes (abs) 0.04 0.00 - 0.11 K/uL    NRBC (Absolute) 0.00 K/uL   Comp Metabolic Panel    Collection Time: 01/24/20  4:00 AM   Result Value Ref Range    Sodium 136 135 - 145 mmol/L    Potassium 4.6 3.6 - 5.5 mmol/L    Chloride 111 96 - 112 mmol/L    Co2 15 (L) 20 - 33 mmol/L    Anion Gap 10.0 0.0 - 11.9    Glucose 116 (H) 65 - 99 mg/dL    Bun 84 (HH) 8 - 22 mg/dL    Creatinine 4.39 (H) 0.50 - 1.40 mg/dL    Calcium 7.5 (L) 8.5 - 10.5 mg/dL    AST(SGOT) 10 (L) 12 - 45 U/L    ALT(SGPT) 5 2 - 50 U/L    Alkaline Phosphatase 53 30 - 99 U/L    Total Bilirubin 0.5 0.1 - 1.5 mg/dL    Albumin 2.5 (L) 3.2 - 4.9 g/dL    Total Protein 4.9 (L) 6.0 - 8.2 g/dL    Globulin 2.4 1.9 - 3.5 g/dL    A-G Ratio 1.0 g/dL   ESTIMATED GFR    Collection Time: 01/24/20  4:00 AM   Result Value Ref Range    GFR If  16 (A) >60 mL/min/1.73 m 2    GFR If Non  13 (A) >60 mL/min/1.73 m 2   MAGNESIUM    Collection Time: 01/24/20  4:00 AM   Result Value Ref Range    Magnesium 2.1 1.5 - 2.5 mg/dL   PHOSPHORUS    Collection Time: 01/24/20  4:00 AM   Result Value Ref Range    Phosphorus 6.4 (H) 2.5 - 4.5 mg/dL   ACCU-CHEK GLUCOSE    Collection Time: 01/24/20  8:32 AM   Result Value Ref Range    Glucose - Accu-Ck 70 65 - 99 mg/dL   CBC WITH DIFFERENTIAL    Collection Time: 01/24/20  8:34 AM   Result Value Ref Range    WBC 4.9 4.8 - 10.8 K/uL    RBC 2.79 (L) 4.70 - 6.10 M/uL    Hemoglobin 8.2 (L) 14.0 - 18.0 g/dL    Hematocrit 25.1  (L) 42.0 - 52.0 %    MCV 90.0 81.4 - 97.8 fL    MCH 29.4 27.0 - 33.0 pg    MCHC 32.7 (L) 33.7 - 35.3 g/dL    RDW 49.2 35.9 - 50.0 fL    Platelet Count 30 (LL) 164 - 446 K/uL    MPV 11.9 9.0 - 12.9 fL    Neutrophils-Polys 87.60 (H) 44.00 - 72.00 %    Lymphocytes 5.50 (L) 22.00 - 41.00 %    Monocytes 5.90 0.00 - 13.40 %    Eosinophils 0.20 0.00 - 6.90 %    Basophils 0.00 0.00 - 1.80 %    Immature Granulocytes 0.80 0.00 - 0.90 %    Nucleated RBC 0.00 /100 WBC    Neutrophils (Absolute) 4.29 1.82 - 7.42 K/uL    Lymphs (Absolute) 0.27 (L) 1.00 - 4.80 K/uL    Monos (Absolute) 0.29 0.00 - 0.85 K/uL    Eos (Absolute) 0.01 0.00 - 0.51 K/uL    Baso (Absolute) 0.00 0.00 - 0.12 K/uL    Immature Granulocytes (abs) 0.04 0.00 - 0.11 K/uL    NRBC (Absolute) 0.00 K/uL   ACCU-CHEK GLUCOSE    Collection Time: 01/24/20  9:27 AM   Result Value Ref Range    Glucose - Accu-Ck 144 (H) 65 - 99 mg/dL   PLATELET MAPPING WITH BASIC TEG    Collection Time: 01/24/20  9:29 AM   Result Value Ref Range    Reaction Time Initial-R 8.2 5.0 - 10.0 min    Clot Kinetics-K 6.5 (H) 1.0 - 3.0 min    Clot Angle-Angle 38.3 (L) 53.0 - 72.0 degrees    Maximum Clot Strength-MA 38.7 (L) 50.0 - 70.0 mm    Lysis 30 minutes-LY30 0.0 0.0 - 8.0 %    % Inhibition ADP 39.2 %    % Inhibition AA 56.0 %    TEG Algorithm Link Algorithm    LACTIC ACID    Collection Time: 01/24/20  9:45 AM   Result Value Ref Range    Lactic Acid 1.0 0.5 - 2.0 mmol/L       (click the triangle to expand results)    IMAGING:  DX-ABDOMEN FOR TUBE PLACEMENT   Final Result      NG tube has been advanced, now terminates over the stomach      Ascites      DX-ABDOMEN FOR TUBE PLACEMENT   Final Result      Enteric tube terminates high, over the distal esophagus. This would benefit from being advanced 10 cm      Ascites      DX-CHEST-FOR LINE PLACEMENT Perform procedure in: Patient's Room   Final Result      1.  Interval insertion of central venous catheter which terminates with the tip projecting over  the expected region of the mid to distal superior vena cava.   2.  Stable enlargement of the cardiomediastinal silhouette.   3.  Stable right pleural effusion.   4.  Stable bilateral basilar atelectasis and/or consolidation. Underlying infection is possible.      EC-ECHOCARDIOGRAM COMPLETE W/O CONT   Final Result      US-RENAL   Final Result      Medium-large volume ascites and pleural fluid      Echogenic material in the bladder surrounds the Fox catheter, likely indicating hemorrhage      Borderline hydronephrosis      Echogenic renal cortices, compatible with medical renal disease      CT-ABDOMEN-PELVIS W/O   Final Result      1.  New anasarca with medium-large pleural effusions, ascites and diffuse fat stranding making detection of inflammation insensitive      2.  Ascites is low density suggesting it is not hemorrhagic      3.  Stable long segmental distal colonic wall thickening without bowel obstruction. This suggests chronic inflammation of infectious favored over inflammatory causes.      4.  4.1 cm ascending aortic aneurysm      5.  Severe coronary artery disease, aortic valve calcification indicating stenosis, left hepatic cyst and/or hemangioma and remote granulomatous disease as before      DX-CHEST-PORTABLE (1 VIEW)   Final Result      Enlarging right greater than left pleural effusions, worsening basilar atelectasis. Consolidation not excluded          IMPRESSION:  SHIRA: renal US findings c/w CKD. Suspect renal injury hemodynamic in nature. Not INR slightly abnorma, albumin low. UA with 6-10 hyaline casts, no other casts noted. Protein and RBCs noted on initial UA. Check PCR and ACR. Given previously abnormal SPEP, check SPEP/UPEP/FLC. BM biopsy already obtained. Agree with GI that large volume paracentesis may be detrimental to renal function. No urgent indication for dialysis, but patient would like HD if needed. He is critically ill, am uncertain this would be truly beneficial. Check MARIE, ANCA.  Hep panel negative. Daily labs and weights, renally dose medications, strict I/O. No NSAIDs, no nephrotoxins. Keep MAP>65, SBP>110, avoid large BP fluctuations. Start midodrine as able, taper off pressors.     CKD: age atrophy, possibly contribution from ?HTN. Check A1c. See SHIRA    Acidosis: stable, should improve with lasix, once able to provide. No bicarb now.    Hypotension: Start midodrine, taper off pressors as able    Pancytopenia: hematology following, etiology not yet clear, see above. Onset in the last 3 months. Transfuse hgb <7. Iron studies reviewed    Melena: no active bleeding noted    Hyperphosphatemia: 2/2 SHIRA, no binders at this time    BPH: per urology    Edema: will attempt to diurese once BP improved/stable. Ascites noted, avoid large volume paracentesis        Other management per primary service        All prior notes form other doctors and RN staff were reviewed from admission to current day to help me make my clinical decisions    Thank you for the consultation!

## 2020-01-24 NOTE — PROGRESS NOTES
[facility] Progress Note      Admit Date: 1/22/2020    Resident(s): Elena Escalante M.D.  Attending: ARIEL THOMAS/ Dr. Cipriano Franco    Date & Time:   1/24/2020    4:30 PM      Patient ID:    Name:             Shawanda Daley     YOB: 1935  Age:                 84 y.o.  male   MRN:               [id number]    HPI:  Shawanda Daley Jr. Is an 84 year old male with past medical history of gastritis, H.pylori , CKD, mild aortic stenosis, pulmonary hypertension, BPH, renal stones who was transferred from [facility] for concerns of acute decompensated heart failure and GI bleed. Patient had presented with complaints of progressive generalized weakness, shortness of breath and abdominal pain. Patient also reported ongoing tarry stools for the last couple of months. Workup in [place] significant for glucose of 44 , hemoglobin 9 hematocrit 29.4 platelets 27 INR 1.2 BUN 85 creatinine 4.4 BNP 24,000 TSH 8.2 occult blood positive.  EKG sinus rhythm with rate of 64.  First degree AV block.  Patient given D50 and transferred to [facility] ED. In the ER, chest X ray showed right> left pleural effusion vs. Possible pneumonia, he was given a dose of 40 mg IV lasix with subsequent gross hematuria. Repeat hemoglobin dropped to 7.8. 1 unit PRBC stat was ordered and pending at the time of my exam. Patient was noted to be progressively hypotensive and desaturating to the 80s with any slight movement. Also hypoglycemic to the 50s. While in the ER, he was also noted to have an episode of syncope. TEG ordered, patient made NPO, GI and heme/onc consulted.     ICU team was consulted for patient's worsening hemodynamic status, hemoglobin dropped from 9-7.9, Hemoccult positive, new onset gross hematuria, pancytopenia platelets 26, worsening AK I on CKD and worsening shortness of breath.  Central line was placed for hemodynamic instability, and blood pressure support and left internal jugular vein.  Initial team had consulted  hematology and gastroenterology for their recommendations. Transfer orders were placed to ICU for higher level of care.     He had been started on protonix, octredotide, for presumed upper GI bleeding/melanotic stools, GI consulted for potential EGD/colonoscopy  And was on ceftriaxone and metronidazole, for possible abdominal infection / diverticulitis.  For hematuria, consulted urology, to see patient; and then consulted nephrology.  For renal failure, had been on LR at 76.  Given his anemia and low platelets, consulted heme/onc, ?DIC vs TTP vs ITP vs malignant process.  They did a bone biopsy (1/23/20, for possible MDS), and had ordered DIC labs, reviewed peripheral smear no schistocytes, Bili WNL. Unlikely intravascular hemolysis , LDH, Ret count, haptoglobin, KAYLEE ordered. Anemia : likely due to GI bleed with history of melena for last 2-3 months. Iron studies, ferritin , B12, folic acid ordered. Bone marrow biopsy to rule out primary bone marrow condition at his age myelodysplastic syndrome.       Interval Events:  In the morning, he was denying any pains, but was tired, and difficult to speak loudly/clearly.  In the afternoon (1/24/20), he was able to speak clearer, and clearly stated his wishes that he did not want any further care.  He stated he had spoken to his wife earlier, and they both agreed that he should go comfort care.  He had previously been DNR/DNI.  He expressed that he understood the consequences of comfort care, and that he may (and likely would) die, without treatment.  After explaining the various options for care, and limited care, he chose to go COMFORT CARE.        Consultants:  ICU: Dr Cipriano Franco  GI:  Dr. Linn  Heme/Onc: Dr. Bates  Urology:  Dr. Mckeon  Nephrology:  Dr. Mendez      Review of Systems   Constitutional: Positive for malaise/fatigue and weight loss (3-40 lbs in past year). Negative for chills, diaphoresis and fever.   HENT: Positive for nosebleeds. Negative for  "congestion, ear discharge, ear pain, hearing loss, sinus pain, sore throat and tinnitus.    Eyes: Negative for blurred vision, double vision, photophobia and discharge.   Respiratory: Positive for cough, sputum production and shortness of breath. Negative for hemoptysis and wheezing.    Cardiovascular: Positive for orthopnea and leg swelling. Negative for chest pain, palpitations, claudication and PND.   Gastrointestinal: Positive for abdominal pain, blood in stool, constipation, melena, nausea and vomiting. Negative for diarrhea and heartburn.   Genitourinary: Positive for hematuria. Negative for dysuria, flank pain, frequency and urgency.   Musculoskeletal: Negative for back pain, joint pain, myalgias and neck pain.   Skin: Negative for itching and rash.   Neurological: Positive for weakness. Negative for dizziness, tingling, tremors, sensory change, speech change, focal weakness, seizures, loss of consciousness and headaches.   Endo/Heme/Allergies: Negative for environmental allergies and polydipsia.   Psychiatric/Behavioral: Negative for memory loss. The patient is not nervous/anxious and does not have insomnia.    All other systems reviewed and are negative.      PHYSICAL EXAM    Vitals:    01/24/20 0545 01/24/20 0600 01/24/20 0615 01/24/20 0630   BP: (!) 78/61 (!) 87/57 (!) 91/63 119/72   Pulse: 76 76 71 76   Resp: (!) 29 15 18 (!) 24   Temp:       TempSrc:       SpO2: 94% 99% 99% 98%   Weight:       Height:         Body mass index is 22.96 kg/m².  /72   Pulse 76   Temp 35.9 °C (96.7 °F) (Temporal)   Resp (!) 24   Ht 1.778 m (5' 10\")   Wt 72.6 kg (160 lb)   SpO2 98%   BMI 22.96 kg/m²   O2 therapy: Pulse Oximetry: 98 %, O2 (LPM): 4, O2 Delivery: Nasal Cannula    Physical Exam   Constitutional: He is oriented to person, place, and time.   Cachectic and appears tired.   HENT:   Head: Normocephalic and atraumatic.   Eyes: Pupils are equal, round, and reactive to light. EOM are normal. No scleral " icterus.   Neck: No tracheal deviation present.   Cardiovascular: Normal rate, regular rhythm and intact distal pulses. Exam reveals no gallop and no friction rub.   Murmur heard.  Pulmonary/Chest: No stridor. He has rales (questionable). He exhibits no tenderness.   Shallow breathing, making evaluation unreliable.   On oxygen.    Abdominal: He exhibits distension (mild). There is tenderness. There is no rebound and no guarding.   Very, very rare bowel sounds.   Musculoskeletal:         General: Edema (trace) present. No tenderness.   Neurological: He is alert and oriented to person, place, and time.   Skin: No rash noted. He is not diaphoretic. No pallor.   Psychiatric: Mood, memory, affect and judgment normal.   Nursing note and vitals reviewed.      Respiratory:     Respiration: (!) 24, Pulse Oximetry: 98 %, O2 Daily Delivery Respiratory : Nasal Cannula    Chest Tube Drains:  None        HemoDynamics:  Pulse: 76 Blood Pressure : 119/72      Neuro:  A&Ox4    Fluids:       Intake/Output Summary (Last 24 hours) at 1/24/2020 1958  Last data filed at 1/24/2020 1500  Gross per 24 hour   Intake 962.78 ml   Output 1825 ml   Net -862.22 ml            Body mass index is 22.96 kg/m².    Recent Labs     01/23/20  1114 01/23/20 2026 01/24/20  0400   SODIUM 137 137 136   POTASSIUM 4.6 4.7 4.6   CHLORIDE 111 110 111   CO2 15* 15* 15*   BUN 82* 79* 84*   CREATININE 4.18* 4.21* 4.39*   MAGNESIUM  --   --  2.1   PHOSPHORUS  --   --  6.4*   CALCIUM 7.4* 7.6* 7.5*       GI/Nutrition:  Recent Labs     01/22/20 2230 01/23/20  1114 01/23/20 2026 01/24/20  0400   ALTSGPT <5  --  <5 5 5   ASTSGOT 8*  --  8* 8* 10*   ALKPHOSPHAT 52  --  49 52 53   TBILIRUBIN 0.5  --  1.2 0.8 0.5   LIPASE 13  --   --   --   --    GLUCOSE 111*   < > 79 87 116*    < > = values in this interval not displayed.       Heme:  Recent Labs     01/23/20  1114  01/24/20  0010 01/24/20  0400 01/24/20  0834   RBC 2.93*   < > 2.80* 2.78* 2.79*   HEMOGLOBIN 8.6*   <  > 8.3* 7.9* 8.2*   HEMATOCRIT 26.5*   < > 25.7* 25.8* 25.1*   PLATELETCT 25*   < > 40* 35* 30*   PROTHROMBTM 17.0*  --   --   --   --    APTT 47.5*  --   --   --   --    INR 1.35*  --   --   --   --    IRON 121  --   --   --   --    FERRITIN 166.2  --   --   --   --    TOTIRONBC 125*  --   --   --   --     < > = values in this interval not displayed.       Infectious Disease:  Temp  Av.8 °C (96.5 °F)  Min: 35.4 °C (95.7 °F)  Max: 36.2 °C (97.1 °F)  Recent Labs     20  1114 20  0010 20  0400 20  0834   WBC 3.6* 8.1 7.3 5.8 4.9   NEUTSPOLYS 75.00* 89.60* 88.70* 88.70* 87.60*   LYMPHOCYTES 15.60* 4.70* 6.30* 5.20* 5.50*   MONOCYTES 7.50 4.80 4.40 5.20 5.90   EOSINOPHILS 0.80 0.20 0.10 0.00 0.20   BASOPHILS 0.30 0.00 0.10 0.20 0.00   ASTSGOT 8* 8*  --  10*  --    ALTSGPT <5 5  --  5  --    ALKPHOSPHAT 49 52  --  53  --    TBILIRUBIN 1.2 0.8  --  0.5  --        Meds:  • ipratropium-albuterol  3 mL     • senna-docusate  2 Tab      And   • polyethylene glycol/lytes  1 Packet      And   • magnesium hydroxide  30 mL      And   • bisacodyl  10 mg     • Respiratory Care per Protocol       • ondansetron  4 mg     • ondansetron  4 mg     • pantoprazole  40 mg     • albuterol  2 Puff     • budesonide-formoterol  2 Puff     • tamsulosin  0.4 mg     • glucose  16 g      And   • dextrose 10% bolus  250 mL     • cefTRIAXone (ROCEPHIN) IV  1 g Stopped (20)   • metroNIDAZOLE (FLAGYL) IV  500 mg Stopped (20)   • NORepinephrine (LEVOPHED) infusion  0-30 mcg/min 5 mcg/min (2016)        Procedures:  Central line- 20  Bone biopsy - 20.    Imaging:  DX-ABDOMEN FOR TUBE PLACEMENT   Final Result      NG tube has been advanced, now terminates over the stomach      Ascites      DX-ABDOMEN FOR TUBE PLACEMENT   Final Result      Enteric tube terminates high, over the distal esophagus. This would benefit from being advanced 10 cm      Ascites      DX-CHEST-FOR LINE  PLACEMENT Perform procedure in: Patient's Room   Final Result      1.  Interval insertion of central venous catheter which terminates with the tip projecting over the expected region of the mid to distal superior vena cava.   2.  Stable enlargement of the cardiomediastinal silhouette.   3.  Stable right pleural effusion.   4.  Stable bilateral basilar atelectasis and/or consolidation. Underlying infection is possible.      EC-ECHOCARDIOGRAM COMPLETE W/O CONT   Final Result      US-RENAL   Final Result      Medium-large volume ascites and pleural fluid      Echogenic material in the bladder surrounds the Fox catheter, likely indicating hemorrhage      Borderline hydronephrosis      Echogenic renal cortices, compatible with medical renal disease      CT-ABDOMEN-PELVIS W/O   Final Result      1.  New anasarca with medium-large pleural effusions, ascites and diffuse fat stranding making detection of inflammation insensitive      2.  Ascites is low density suggesting it is not hemorrhagic      3.  Stable long segmental distal colonic wall thickening without bowel obstruction. This suggests chronic inflammation of infectious favored over inflammatory causes.      4.  4.1 cm ascending aortic aneurysm      5.  Severe coronary artery disease, aortic valve calcification indicating stenosis, left hepatic cyst and/or hemangioma and remote granulomatous disease as before      DX-CHEST-PORTABLE (1 VIEW)   Final Result      Enlarging right greater than left pleural effusions, worsening basilar atelectasis. Consolidation not excluded          Problem and Plan:    * Melanotic stools  Assessment & Plan  Several month hx of post-prandial abdominal pain and dark tarry stools  No masses noted on CT abd/pelvis concerning for malignancy  Extensive arterial calcifications noted throughout visceral vasculature  Chronic mesenteric ischemia? Possible upper GI bleed? Peptic ulcer disease?   Unclear etiology  Had been on :  - Protonix 40mg  BID   - Discuss consulting GI for EGD/Colonoscopy  Then went COMFORT CARE.  - discontinued unnecessary treatments.     GI bleed  Assessment & Plan  ICU team was consulted for patient's worsening hemodynamic status, hemoglobin dropped from 9-7.9, Hemoccult positive, new onset gross hematuria, pancytopenia platelets 26, worsening AK I on CKD and worsening shortness of breath.  Central line was placed for hemodynamic instability, and blood pressure support and left internal jugular vein.  Initial team had consulted hematology and gastroenterology for their recommendations. Transfer orders were placed to ICU for higher level of care.  Had been on :  - Protonix 40mg BID   - Oceteotride  -For presumed upper GI bleeding/melanotic stools, GI consulted for potential EGD/colonoscopy   Then went COMFORT CARE.  - discontinued unnecessary treatments.     Hematuria  Assessment & Plan  - Nephrology consulted for new onset gross hematuria,   - had CBI through [facility].   - continue [facility], for Comfort.     Pancytopenia (HCC)  Assessment & Plan  Pancytopenic w/ leukopenia, anemia, and thrombocytopenia  Myeloproliferative disease? MGUS? Multiple myeloma?  Unclear etiology  Thrombocytopenic w/ bleeding from gastrointestinal tract, nose, and genitourinary tract  Anemic w/ drop in Hemoglobin <8.0  Ordered for 1u pRBC   Had been on :   - Transfuse pRBCs for Hgb<8.0  - Trend H/H q6hrs  - Discuss consulting hematology for bone marrow biopsy  - Discuss transfusing plts <30,000 given active bleeds  - Discuss consulting palliative care  - SPEP with M-spike in Gamma region, with IgG type Kappa monoclonal protein, and additional band of IgA Lambda.   - H/O had done bone biopsy (1/23/20).   Then went COMFORT CARE.  - discontinued unnecessary treatments.       Acute on chronic renal failure (HCC)  Assessment & Plan  Cardiorenal? Pre-renal? Likely cardiorenal. Baseline Cr 2.5-3.2. Serum creatinine >4.0 on presentation. Administered 40mg IV Lasix in  ED.  Had been on :  - Follow up urine electrolytes  - Fox catheter in place  - Strict I/Os  - Likely pre-renal due to bleed, decreased po intake. Nephrology consult recommended per heme/onc  Then went COMFORT CARE.  - discontinued unnecessary treatments.     Acute decompensated heart failure (HCC)  Assessment & Plan  Profoundly elevated BNP of 24,000 at [facility] w/ worsening shortness of breath, orthopnea, bilateral pleural effusions, and anasarca  Last TTE in 07/2019 demonstrated LVEF of 65% w/o major structural defects  Diffuse coronary calcifications noted on CT chest  No prior documented hx of cardiomyopathy, cardiac ischemic, ACS, etc  Volume overloaded state peripherally due to poor pump function  Had been on :  - One time dose of Lasix 40mg administered in ED   - TTE was ordered, f/u  - Could consider consulting cardiology  - Follow up EKG  Then went COMFORT CARE.  - discontinued unnecessary treatments.     Bilateral pleural effusion  Assessment & Plan  Large bilateral pleural effusions, transudative in appearance on CT  Likely secondary to pump failure in setting of elevated BNP.  Chest X ray showed right> left pleural effusion vs. Possible pneumonia  Had been on :  - Hold diuresis at this time given volume depleted clinical picture  - Will need thoracentesis for pleural effusions  - Empiric abx- ceftriaxone and metronidazole (colitis/diverticulitis)  - Continuous pulse oximetry  Then went COMFORT CARE.  - discontinued unnecessary treatments.     Left lower quadrant abdominal pain  Assessment & Plan  LLQ pain, distended abd on exam. CT abd/pelvis- stable long segmental distal colonic wall thickening without bowel obstruction, suggests chronic inflammation of infectious favored over inflammatory causes.  Had been on :  - Empiric abx- ceftriaxone and metronidazole (colitis/diverticulitis)  - CTM for worsening distention, bowel movements/bowel regime  Then went COMFORT CARE.  -  discontinued unnecessary treatments.     Anasarca  Assessment & Plan  Bilateral pleural effusions w/ ascites w/ bilateral lower extremity pitting edema  Serum albumin 2.7, not that low  Liver function WNL  Likely secondary to pump failure  Had been on :  - Administered 40mg of Lasix in ED  - Hold diuresis at this time given volume depletion clinical picture  - Tap abdomen/chest as needed for therapeutic/diagnostic purposes  - Not witnessed on exam on 1/24/20.  Then went COMFORT CARE.  - discontinued unnecessary treatments.       DISPO:   COMFORT CARE - transfer to floor.     CODE STATUS: DNR/DNI  -->  COMFORT CARE    Quality Measures:  Fox Catheter:  Yes  (continue for patient comfort)  DVT Prophylaxis: SCDs- stopped.  Ulcer Prophylaxis: Protonix- stopped, can resume if GERD   Antibiotics: Ceftriaxone and Metronidazole ... stopped for comfort care.   Lines: Left IJ central line, 7 french triple lumen

## 2020-01-24 NOTE — CARE PLAN
Problem: Knowledge Deficit  Goal: Knowledge of disease process/condition, treatment plan, diagnostic tests, and medications will improve  Intervention: Explain information regarding disease process/condition, treatment plan, diagnostic tests, and medications and document in education  Note:   Pt educated on plan of care.  Reinforcement provided       Problem: Safety  Goal: Will remain free from falls  Intervention: Implement fall precautions  Note:   Precautions in place.  Reinforcement provided

## 2020-01-24 NOTE — PROGRESS NOTES
"Spoke with wife Esther. Informed her of pt's wishes for comfort care and withdrawal of treatment. Wife stated that she had discussed this with the patient earlier today and wants to fulfil his wishes to be comfortable. Comfort/support provided. Wife declined to come in to the hospital at this time as it would be \"too difficult\". Informed wife that hospital staff would notify her when the patient expires. Wife verbalized understanding of plan to go comfort care and agrees with plan. Dr. Sears and patient notified of conversation with wife.  "

## 2020-01-24 NOTE — CONSULTS
"[facility] [place] Consult/H&P Note    Primary Service: Hospital  Attending: Cipriano Franco D.O.  Patient's Name/MRN: Shawanda Daley Jr., [id number]    Admit Date:1/22/2020  Today's Date: 1/24/2020   Length of stay:  LOS: 1 day   Room #: S133/00      Reason for consult/chief complaint: hematuria and renal failure   ID/HPI: Shawanda Daley Jr. is a 84 y.o. male patient who presented to the ED at [facility] on 1/22 with abdominal pain, weakness and SOB. [place] [facility] [place]. Fox catheter in place with gross hematuria; on CBI. Cr was 4.4. Per nurse, patient is wanting comfort care and no more medical interventions. Palliative care has been consulted.     HPI limited and obtained from other medical records and RN d/t patient;s acuity.        Past Medical History:   Past Medical History:   Diagnosis Date   • Aortic stenosis     mild per cardiology note. Followed by [facility] cardiology.   • Bowel habit changes 10/21/2019    constipation & diarrhea   • BPH (benign prostatic hyperplasia) 3/4/2016   • Breath shortness     10/21/19-started 2yrs ago but has worsened in past 4 weeks. Increases with exertion.    • Cataract     fabiola lens surgery   • Chronic fatigue    • Coagulopathy (HCC) 1/23/2020   • Edema of both feet 10/21/2019    and legs with redness. Denies open sores   • Hernia of abdominal wall     10/21/19-above umbilicus and pt states above previous hernia mesh & was told fatty and to push back in prn.    • History of anemia    • Indigestion     no meds   • Loss of appetite    • Low serum vitamin B12 3/15/2019   • Pulmonary hypertension (HCC)     followed by [facility] cardiology   • Renal disorder 2015    kidney stone.    • Renal disorder 10/21/2019    \"weak kidney\".  Elevated creatinine on labs   • Stomach pain 10/21/2019   • Urinary incontinence         Past Surgical History:   Past Surgical History:   Procedure Laterality Date   • KS DX BONE MARROW ASPIRATIONS Left 1/23/2020    Procedure: ASPIRATION, BONE MARROW;  " Surgeon: Shy Herrera M.D.;  Location: ENDOSCOPY [facility];  Service: Orthopedics   • KS DX BONE MARROW BIOPSIES Left 2020    Procedure: BIOPSY, BONE MARROW, USING NEEDLE OR TROCAR;  Surgeon: Shy Herrera M.D.;  Location: ENDOSCOPY [facility];  Service: Orthopedics   • GASTROSCOPY-ENDO  10/24/2019    Procedure: GASTROSCOPY- POSSIBLE BIOPSY, DILATION, POLYPECTOMY, CONTROL OF HEMORRHAGE;  Surgeon: Ephraim Medrano M.D.;  Location: SURGERY [facility];  Service: Gastroenterology   • EGD W/ENDOSCOPIC ULTRASOUND  10/24/2019    Procedure: EGD, WITH ENDOSCOPIC US;  Surgeon: Ephraim Medrano M.D.;  Location: [facility];  Service: Gastroenterology   • EGD WITH ASP/BX  10/24/2019    Procedure: EGD, WITH ASPIRATION BIOPSY- FNA;  Surgeon: Ephraim Medrano M.D.;  Location: SURGERY [facility];  Service: Gastroenterology   • ENDOSCOPY-ESOPH/STOMACH  10/02/2019   • COLONOSCOPY  2018   • BREAST BIOPSY Left 2016    Procedure: BREAST BIOPSY;  Surgeon: Adrianne Dobbins M.D.;  Location: SURGERY [facility];  Service:    • INGUINAL HERNIA REPAIR ROBOTIC Bilateral 8/10/2016    Procedure: INGUINAL HERNIA REPAIR ROBOTIC with mesh  ;  Surgeon: Francis Fletcher M.D.;  Location: SURGERY [facility];  Service:    • COLONOSCOPY     • CYST EXCISION      anal cyst        Family History:   Family History   Problem Relation Age of Onset   • Other Mother         old age   • Heart Disease Father    • Stroke Father 86         from stroke   • Colon Cancer Father         colon cancer   • Cancer Maternal Uncle         prostate   • Alzheimer's Disease Sister    • Cancer Brother         LUNG CANCER   • Other Brother         on a blood thinner medication         Social History:   Social History     Tobacco Use   • Smoking status: Never Smoker   • Smokeless tobacco: Never Used   Substance Use Topics   • Alcohol use: Not Currently     Alcohol/week: 0.0 oz     Frequency: Never      "Binge frequency: Never   • Drug use: No      Social History     Patient does not qualify to have social determinant information on file (likely too young).   Social History Narrative    Retired .  Lives with his wife.  He is using a walker but not having any falls or head trauma.  No memory concerns.  He is having some hearing concerns.  He was hospitalized in 2019.        Allergies: he Patient has no known allergies.    Medications:   Medications Prior to Admission   Medication Sig Dispense Refill Last Dose   • bismuth subsalicylate (PEPTO-BISMOL) 262 MG Chew Tab Take 524 mg by mouth 4 times a day as needed.   PRN at PRN   • nystatin-triamcinalone (MYCOLOG) 434493-1.1 UNIT/GM-% Ointment Apply 1 Application to affected area(s) 2 times a day as needed.   PRN at PRN   • budesonide-formoterol (SYMBICORT) 80-4.5 MCG/ACT Aerosol Inhale 2 Puffs by mouth 2 Times a Day. 1 Inhaler 0 1/22/2020 at PM   • albuterol 108 (90 Base) MCG/ACT Aero Soln inhalation aerosol Inhale 2 Puffs by mouth every four hours as needed. 8.5 g 0 PRN at PRN   • omeprazole (PRILOSEC) 20 MG delayed-release capsule Take 1 Cap by mouth 2 times a day for 42 days. 84 Cap 0 1/22/2020 at PM   • ondansetron (ZOFRAN ODT) 4 MG TABLET DISPERSIBLE Take 1 Tab by mouth every 6 hours as needed for Nausea. 30 Tab 0 PRN at PRN   • gabapentin (NEURONTIN) 100 MG Cap Take 1 Cap by mouth every evening. 90 Cap 3 1/22/2020 at PM   • tamsulosin (FLOMAX) 0.4 MG capsule Take 0.4 mg by mouth 2 Times a Day.   1/22/2020 at PM         Review of Systems  Review of Systems   Unable to perform ROS: Acuity of condition        Physical Exam  VITAL SIGNS: /76   Pulse 85   Temp 35.8 °C (96.5 °F) (Temporal)   Resp 13   Ht 1.778 m (5' 10\")   Wt 72.6 kg (160 lb)   SpO2 92%   BMI 22.96 kg/m²   Physical Exam   Constitutional:   Frail appearing   HENT:   NG tube in place   Genitourinary:    Genitourinary Comments: Fox in place with pink tinged urine     Nursing note " and vitals reviewed.        Labs:  Recent Labs     01/24/20  0010 01/24/20  0400 01/24/20  0834   WBC 7.3 5.8 4.9   RBC 2.80* 2.78* 2.79*   HEMOGLOBIN 8.3* 7.9* 8.2*   HEMATOCRIT 25.7* 25.8* 25.1*   MCV 91.8 92.8 90.0   MCH 29.6 28.4 29.4   MCHC 32.3* 30.6* 32.7*   RDW 49.6 49.8 49.2   PLATELETCT 40* 35* 30*   MPV 11.4 11.3 11.9     Recent Labs     01/23/20  1114 01/23/20 2026 01/24/20  0400   SODIUM 137 137 136   POTASSIUM 4.6 4.7 4.6   CHLORIDE 111 110 111   CO2 15* 15* 15*   GLUCOSE 79 87 116*   BUN 82* 79* 84*   CREATININE 4.18* 4.21* 4.39*   CALCIUM 7.4* 7.6* 7.5*     Recent Labs     01/23/20  1114   APTT 47.5*   INR 1.35*     Glucose:  Recent Labs     01/23/20  0423 01/23/20  1114 01/23/20 2026 01/24/20  0400   GLUCOSE 59* 79 87 116*     Coags:  Recent Labs     01/23/20  1114   INR 1.35*         Urinalysis:   No results for input(s): COLORURINE, CLARITY, SPECGRAVITY, PHURINE, GLUCOSEUR, KETONES, NITRITE, OCCULTBLOOD, RBCURINE, BACTERIA, EPITHELCELL in the last 72 hours.    Invalid input(s): BILRUBINUR, LEUESTERASE    Imaging:                                  Results for orders placed during the hospital encounter of 10/23/19   CT-CHEST (THORAX) W/O    Impression 1.  Evidence of old granulomatous disease.  2.  Small bilateral pleural effusions, larger on the RIGHT with associated mild atelectasis.  3.  No pneumonia or pneumothorax.  4.  Small pericardial effusion.                Results for orders placed during the hospital encounter of 07/02/06   CT-CHEST, HIGH RESOLUTION LUNG    Impression IMPRESSION:    EVIDENCE FOR OLD GRANULOMATOUS DISEASE IN THE LUNG PARENCHYMA AND HILAR   AREAS, WHICH WAS PRESENT PREVIOUSLY.  STABLE BLEB IN THE LEFT BASE.  NO   INTERVAL CHANGE AND NO FINDINGS SUGGESTIVE OF DIFFUSE INTERSTITIAL LUNG   DISEASE.        SJW/peb        Read By KATHY ESTRADA MD on Jul  3 2006 11:10AM  : [place] Transcription Date: Jul  3 2006 11:23AM  THIS DOCUMENT HAS BEEN ELECTRONICALLY SIGNED  BY: KATHY ESTRADA MD on Jul  3   2006 12:44PM                        Results for orders placed during the hospital encounter of 01/22/20   CT-ABDOMEN-PELVIS W/O    Impression 1.  New anasarca with medium-large pleural effusions, ascites and diffuse fat stranding making detection of inflammation insensitive    2.  Ascites is low density suggesting it is not hemorrhagic    3.  Stable long segmental distal colonic wall thickening without bowel obstruction. This suggests chronic inflammation of infectious favored over inflammatory causes.    4.  4.1 cm ascending aortic aneurysm    5.  Severe coronary artery disease, aortic valve calcification indicating stenosis, left hepatic cyst and/or hemangioma and remote granulomatous disease as before       Results for orders placed in visit on 04/28/15   CT-ABDOMEN-PELVIS WITH & W/O    Impression 1.  6.5 mm oval-shaped calcification is identified at the proximal end of the left ureter. However, no hydronephrosis is present at this time.    2.  3 mm lesion posteriorly in right renal parenchyma is likely a small cyst.    3.  Enlargement of the prostate gland is noted.    4.  2 small liver lesions are identified that are likely benign.    5.  Incidentally noted a retroaortic left renal vein.                         Assessment/Recommendation   84 y.o.male who was transferred to [facility] with a sousa catheter in place and kesha hematuria. Admitted to the hospitalist with multiple medical issues.     · PLAN:   · Continue sousa. Wean CBI clear to light pink. Hand irrigate as needed for clot retention or decreased urine output.    · Of note on CT, 6.5 mm proximal left non-obstructing ureteral stone. Nothing urgent to do at this time.   · Per nurse, patient is wanting comfort care and no more medical interventions. Palliative care has been consulted.   · We will follow until comfort care has been decided.   · Continue to follow labs for now.  · Dr. Rouse is aware of this consultation and has  directed the plan of care.        Nancy Mckeon P.A.-C.   [address] [facility] [place], [place] [address] [phone]

## 2020-01-24 NOTE — THERAPY
OT consult received, per PT RN requested hold this am as pt is medically unstable to participate in skilled session. Will defer OT evaluation today.

## 2020-01-24 NOTE — CONSULTS
Date of service: 1/23/2020    Attending Physician: Swapnil Us M.D.    History of Present Illness: Shawanda Daley Jr. is a 84 y.o. male here for melena.     I was asked by the medical hospitalist team to consult on this 84-year-old male who was found to have melenic stool.  We are asked to evaluate him for endoscopy.  Upon review of the charts and the laboratory data it became clear that the patient has pancytopenia.  We requested that hematology consult and a bone marrow apparently was done today in my discussions with the intensivist.    I do realize now that the patient has massive ascites.  It may be difficult to do a paracentesis without correcting his severe thrombocytopenia.    The patient is currently not bleeding from the upper GI tract per nursing.  There is no evidence for melena, hematochezia and the NG tube reveals clear bilious fluid only.  The patient does mention that he has been experiencing nosebleeds over the past weeks.    He does not have a history of hepatitis heavy drinking of liver disease.  He does have a history of significant cardiac disease.    He has significant anemia a moderate leukopenia and a profound thrombocytopenia.    He needs to be worked up for portal hypertension and ascites on the basis of portal hypertension and liver disease.  He also needs to be worked up for cardiac causes of ascites as well as for potentially ascites on the basis of a malignant process.    When his platelets improve he needs a paracentesis with cytology Gram stain cell count as well as a diagnostic and therapeutic paracentesis to relieve the pressure.  The concern however is his chronic liver disease.    The patient appears quite cachectic, has COPD, has lost 30 pounds, has pancytopenia but there is no evidence for acute GI bleeding.  There is some concern for descending colon inflammation however in the setting of ascites hypoalbuminemia that is often seen.    Patient has muscle mass loss,  bitemporal wasting, ascites, lower extremity edema, pancytopenia, all suggestive of advanced liver disease.  He has a very elevated iron saturation.  Ferritin is not available yet.  This should be repeated in the nonacute setting and hemochromatosis gene testing can potentially be done.  Review of Systems:  Review of Systems   Constitutional: Positive for fever, malaise/fatigue and weight loss.   Eyes: Negative.    Respiratory: Positive for shortness of breath.    Cardiovascular: Positive for orthopnea and leg swelling. Negative for chest pain.   Gastrointestinal: Positive for abdominal pain, blood in stool, heartburn and nausea.   Genitourinary: Positive for hematuria.   Musculoskeletal: Positive for myalgias.   Endo/Heme/Allergies: Bruises/bleeds easily.   Psychiatric/Behavioral: Negative.        Current Facility-Administered Medications   Medication Dose Route Frequency Provider Last Rate Last Dose   • senna-docusate (PERICOLACE or SENOKOT S) 8.6-50 MG per tablet 2 Tab  2 Tab Oral BID Kevin Dick M.D.   2 Tab at 01/23/20 0646    And   • polyethylene glycol/lytes (MIRALAX) PACKET 1 Packet  1 Packet Oral QDAY PRN Kevin Dick M.D.        And   • magnesium hydroxide (MILK OF MAGNESIA) suspension 30 mL  30 mL Oral QDAY PRN Kevin Dick M.D.        And   • bisacodyl (DULCOLAX) suppository 10 mg  10 mg Rectal QDAY PRN Kevin Dick M.D.       • Respiratory Care per Protocol   Nebulization Continuous RT Kevin Dick M.D.       • ondansetron (ZOFRAN) syringe/vial injection 4 mg  4 mg Intravenous Q4HRS PRN Kevin Dick M.D.   4 mg at 01/23/20 1616   • ondansetron (ZOFRAN ODT) dispertab 4 mg  4 mg Oral Q4HRS PRN Kevin Dick M.D.       • pantoprazole (PROTONIX) injection 40 mg  40 mg Intravenous BID Kevin Dick M.D.   40 mg at 01/23/20 0928   • albuterol inhaler 2 Puff  2 Puff Inhalation Q4HRS PRN Mohammad S Dick, M.D.       • budesonide-formoterol (SYMBICORT) 80-4.5  MCG/ACT inhaler 2 Puff  2 Puff Inhalation BID Kevin Dick M.D.   2 Puff at 01/23/20 0600   • diphenhydrAMINE (BENADRYL) tablet/capsule 50 mg  50 mg Oral QHS PRN Kevin Dick M.D.       • tamsulosin (FLOMAX) capsule 0.4 mg  0.4 mg Oral DAILY Kevin Dick M.D.   0.4 mg at 01/23/20 0646   • glucose 4 g chewable tablet 16 g  16 g Oral Q15 MIN PRN Debo Wade M.D.        And   • DEXTROSE 10% BOLUS 250 mL  250 mL Intravenous Q15 MIN PRN Debo Wade M.D.       • cefTRIAXone (ROCEPHIN) 1 g in  mL IVPB  1 g Intravenous QHS Debo Wade M.D.       • metroNIDAZOLE (FLAGYL) IVPB 500 mg  500 mg Intravenous Q8HRS Debo Wade M.D.   Stopped at 01/23/20 0845   • lactated ringers infusion   Intravenous Continuous Elena Escalante M.D. 75 mL/hr at 01/23/20 0845     • NS infusion   Intravenous Continuous Cipriano Franco D.O. 30 mL/hr at 01/23/20 1613     • norepinephrine (LEVOPHED) 8 mg in  mL Infusion  0-30 mcg/min Intravenous Continuous Elena Escalante M.D. 28.1 mL/hr at 01/23/20 1506 15 mcg/min at 01/23/20 1506       Social History     Tobacco Use   • Smoking status: Never Smoker   • Smokeless tobacco: Never Used   Substance Use Topics   • Alcohol use: Not Currently     Alcohol/week: 0.0 oz     Frequency: Never     Binge frequency: Never   • Drug use: No        Past Medical History:   Diagnosis Date   • Aortic stenosis     mild per cardiology note. [facility].   • Bowel habit changes 10/21/2019    constipation & diarrhea   • BPH (benign prostatic hyperplasia) 3/4/2016   • Breath shortness     10/21/19-started 2yrs ago but has worsened in past 4 weeks. Increases with exertion.    • Cataract     fabiola lens surgery   • Chronic fatigue    • Coagulopathy (HCC) 1/23/2020   • Edema of both feet 10/21/2019    and legs with redness. Denies open sores   • Hernia of abdominal wall     10/21/19-above umbilicus and pt states above previous hernia mesh & was told fatty and to push  "back in prn.    • History of anemia    • Indigestion     no meds   • Loss of appetite    • Low serum vitamin B12 3/15/2019   • Pulmonary hypertension (HCC)     [facility]   • Renal disorder 2015    kidney stone.    • Renal disorder 10/21/2019    \"weak kidney\".  Elevated creatinine on labs   • Stomach pain 10/21/2019   • Urinary incontinence        Past Surgical History:   Procedure Laterality Date   • GASTROSCOPY-ENDO  10/24/2019    Procedure: GASTROSCOPY- POSSIBLE BIOPSY, DILATION, POLYPECTOMY, CONTROL OF HEMORRHAGE;  Surgeon: Ephraim Medrano M.D.;  Location: [facility];  Service: Gastroenterology   • EGD W/ENDOSCOPIC ULTRASOUND  10/24/2019    Procedure: EGD, WITH ENDOSCOPIC US;  Surgeon: Ephraim Medrano M.D.;  Location: [facility];  Service: Gastroenterology   • EGD WITH ASP/BX  10/24/2019    Procedure: EGD, WITH ASPIRATION BIOPSY- FNA;  Surgeon: Ephraim Medrano M.D.;  Location: [facility];  Service: Gastroenterology   • ENDOSCOPY-ESOPH/STOMACH  10/02/2019   • COLONOSCOPY  2018   • BREAST BIOPSY Left 2016    Procedure: BREAST BIOPSY;  Surgeon: Adrianne Dobbins M.D.;  Location: SURGERY [facility];  Service:    • INGUINAL HERNIA REPAIR ROBOTIC Bilateral 8/10/2016    Procedure: INGUINAL HERNIA REPAIR ROBOTIC with mesh  ;  Surgeon: Francis Fletcher M.D.;  Location: SURGERY [facility];  Service:    • COLONOSCOPY     • CYST EXCISION      anal cyst       Allergies: Patient has no known allergies.    Family History   Problem Relation Age of Onset   • Other Mother         old age   • Heart Disease Father    • Stroke Father 86         from stroke   • Colon Cancer Father         colon cancer   • Cancer Maternal Uncle         prostate   • Alzheimer's Disease Sister    • Cancer Brother         LUNG CANCER   • Other Brother         on a blood thinner medication       Vitals:    20 2153 20 2159 20 0402 20 0500 " "  Height: 1.778 m (5' 10\")      Weight: 72.6 kg (160 lb)      Weight % change since last entry.: 0 %      BP:  119/69 (!) 94/52    Pulse:  66 62 (!) 59   BMI (Calculated): 22.96      Resp:  20 18 20   Temp:  36.5 °C (97.7 °F)     TempSrc:  Oral      01/23/20 0647 01/23/20 0658 01/23/20 0700 01/23/20 0817   Height:       Weight:       Weight % change since last entry.:       BP: 103/54 (!) 99/62 (!) 99/62 (!) 94/52   Pulse: 69 60 64 61   BMI (Calculated):       Resp: 19 16 (!) 21 19   Temp:  36.3 °C (97.4 °F)     TempSrc:        01/23/20 0827 01/23/20 0831 01/23/20 0902 01/23/20 1234   Height:       Weight:       Weight % change since last entry.:       BP: (!) 96/61 101/55 (!) 99/50 105/64   Pulse: 60 60 (!) 58 (!) 58   BMI (Calculated):       Resp: (!) 23 15 18 (!) 40   Temp:       TempSrc:        01/23/20 1242 01/23/20 1247 01/23/20 1253 01/23/20 1259   Height:       Weight:       Weight % change since last entry.:       BP: (!) 96/61 103/62 114/73 111/65   Pulse: (!) 57 (!) 59 66 67   BMI (Calculated):       Resp:  17 19 18   Temp:       TempSrc:        01/23/20 1302 01/23/20 1308 01/23/20 1312 01/23/20 1314   Height:       Weight:       Weight % change since last entry.:       BP: 104/59 (!) 82/52 (!) 78/46 (!) 78/52   Pulse: 68 65 63 62   BMI (Calculated):       Resp: 13 15 16 (!) 28   Temp:       TempSrc:        01/23/20 1317 01/23/20 1318 01/23/20 1320 01/23/20 1324   Height:       Weight:       Weight % change since last entry.:       BP: (!) 71/47 (!) 77/53 (!) 77/37 (!) 84/57   Pulse: 62 62 61 61   BMI (Calculated):       Resp: 14 16 15 13   Temp:       TempSrc:        01/23/20 1352 01/23/20 1414 01/23/20 1416 01/23/20 1424   Height:       Weight:       Weight % change since last entry.:       BP: 108/57 (!) 91/55 (!) 73/52 (!) 71/47   Pulse: 62 (!) 56 (!) 54 (!) 58   BMI (Calculated):       Resp: 12 17 (!) 29 15   Temp:  35.8 °C (96.5 °F)     TempSrc:        01/23/20 1429 01/23/20 1434 01/23/20 1444 " 01/23/20 1446   Height:       Weight:       Weight % change since last entry.:       BP: (!) 71/43 (!) 71/43 (!) 76/48 (!) 65/43   Pulse: (!) 56 (!) 56 60 (!) 59   BMI (Calculated):       Resp: 19 19 18 13   Temp: 35.9 °C (96.6 °F)      TempSrc: Tympanic       01/23/20 1452 01/23/20 1458 01/23/20 1501 01/23/20 1502   Height:       Weight:       Weight % change since last entry.:       BP: (!) 95/53 (!) 92/52 (!) 99/62 (!) 93/53   Pulse: (!) 59 66 64 65   BMI (Calculated):       Resp: (!) 22 19 (!) 32 (!) 21   Temp:    35.9 °C (96.7 °F)   TempSrc:        01/23/20 1504 01/23/20 1517 01/23/20 1518 01/23/20 1521   Height:       Weight:       Weight % change since last entry.:       BP: (!) 93/53 (!) 161/86 (!) 161/86 156/98   Pulse: 64 89 90 93   BMI (Calculated):       Resp: 17 19 19 (!) 22   Temp:  (!) 35.6 °C (96.1 °F)     TempSrc:        01/23/20 1526 01/23/20 1531 01/23/20 1602   Height:      Weight:      Weight % change since last entry.:      BP: 149/87 153/86 142/85   Pulse: 95 94 96   BMI (Calculated):      Resp: 15 19 (!) 23   Temp:   (!) 35.4 °C (95.7 °F)   TempSrc:          Physical Examination:    Physical Exam   Constitutional: He is oriented to person, place, and time. He appears distressed.   Cachectic appearing with bitemporal wasting chronically ill-appearing   HENT:   Head: Normocephalic and atraumatic.   Eyes: No scleral icterus.   Neck: No tracheal deviation present.   Cardiovascular: Normal rate and regular rhythm.   Pulmonary/Chest: No stridor. He is in respiratory distress.   Abdominal: He exhibits distension. There is tenderness. There is no rebound and no guarding.   Musculoskeletal: Normal range of motion.   Neurological: He is alert and oriented to person, place, and time.   Skin: Skin is warm and dry.   Psychiatric: Affect and judgment normal.         Lab Results   Component Value Date/Time    PROTHROMBTM 17.0 (H) 01/23/2020 11:14 AM    INR 1.35 (H) 01/23/2020 11:14 AM      Lab Results    Component Value Date/Time    WBC 3.6 (L) 01/23/2020 11:14 AM    RBC 2.93 (L) 01/23/2020 11:14 AM    HEMOGLOBIN 8.6 (L) 01/23/2020 11:14 AM    HEMATOCRIT 26.5 (L) 01/23/2020 11:14 AM    MCV 90.4 01/23/2020 11:14 AM    MCH 29.4 01/23/2020 11:14 AM    MCHC 32.5 (L) 01/23/2020 11:14 AM    MPV 12.0 01/23/2020 11:14 AM    NEUTSPOLYS 75.00 (H) 01/23/2020 11:14 AM    LYMPHOCYTES 15.60 (L) 01/23/2020 11:14 AM    MONOCYTES 7.50 01/23/2020 11:14 AM    EOSINOPHILS 0.80 01/23/2020 11:14 AM    BASOPHILS 0.30 01/23/2020 11:14 AM      Lab Results   Component Value Date/Time    SODIUM 137 01/23/2020 11:14 AM    POTASSIUM 4.6 01/23/2020 11:14 AM    CHLORIDE 111 01/23/2020 11:14 AM    CO2 15 (L) 01/23/2020 11:14 AM    GLUCOSE 79 01/23/2020 11:14 AM    BUN 82 (HH) 01/23/2020 11:14 AM    CREATININE 4.18 (H) 01/23/2020 11:14 AM      Recent Labs     01/22/20  2230 01/23/20  1114   ASTSGOT 8* 8*   ALTSGPT <5 <5   TBILIRUBIN 0.5 1.2   ALKPHOSPHAT 52 49   GLOBULIN 2.4 2.3   INR  --  1.35*       Imaging:   DX-CHEST-FOR LINE PLACEMENT Perform procedure in: Patient's Room   Final Result      1.  Interval insertion of central venous catheter which terminates with the tip projecting over the expected region of the mid to distal superior vena cava.   2.  Stable enlargement of the cardiomediastinal silhouette.   3.  Stable right pleural effusion.   4.  Stable bilateral basilar atelectasis and/or consolidation. Underlying infection is possible.      EC-ECHOCARDIOGRAM COMPLETE W/O CONT   Final Result      US-RENAL   Final Result      Medium-large volume ascites and pleural fluid      Echogenic material in the bladder surrounds the Fox catheter, likely indicating hemorrhage      Borderline hydronephrosis      Echogenic renal cortices, compatible with medical renal disease      CT-ABDOMEN-PELVIS W/O   Final Result      1.  New anasarca with medium-large pleural effusions, ascites and diffuse fat stranding making detection of inflammation  insensitive      2.  Ascites is low density suggesting it is not hemorrhagic      3.  Stable long segmental distal colonic wall thickening without bowel obstruction. This suggests chronic inflammation of infectious favored over inflammatory causes.      4.  4.1 cm ascending aortic aneurysm      5.  Severe coronary artery disease, aortic valve calcification indicating stenosis, left hepatic cyst and/or hemangioma and remote granulomatous disease as before      DX-CHEST-PORTABLE (1 VIEW)   Final Result      Enlarging right greater than left pleural effusions, worsening basilar atelectasis. Consolidation not excluded              Assessment and Plan:  1.  Melena and anemia currently no evidence for active bleeding  2.  Profound pancytopenia including most significantly a thrombocytopenia which precludes currently diagnostic or endoscopic intervention.    3.  Ascites that is significant and paracentesis diagnostic and therapeutic will be needed.  That may however compromise is already critical chronic renal failure.    We will check the ascites fluid for cytology, cell count, albumin, serum ascites albumin gradient, Gram stain.    4.  Will evaluate for iron deficiency by ordering iron studies including ferritin.    5.  Would order hepatitis serologies, A, B, C.    6.  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  This was discussed with the pulmonary intensivist.

## 2020-01-24 NOTE — PROGRESS NOTES
Gastroenterology Progress Note     Author: KARLA Benitez   Date & Time Created: 1/24/2020 10:39 AM    Chief Complaint:  Melena, Anemia, Ascites    Interval History:  NPO, NG to suction put out 200mls of green liquid overnight.  No nausea or vomiting reported.  No bowel movements since PTA.  Continuous bladder irrigation pink in color, will be overtly bloody if stopped. He requires vasopressor therapy to maintain MAP, levophed at 5mcg/min.     Review of Systems:  Review of Systems   Constitutional: Positive for malaise/fatigue.   Respiratory: Positive for shortness of breath.    Gastrointestinal: Negative for blood in stool, melena, nausea and vomiting.       Physical Exam:  Physical Exam  Constitutional:       Appearance: He is ill-appearing.   HENT:      Head: Normocephalic.   Eyes:      Pupils: Pupils are equal, round, and reactive to light.   Cardiovascular:      Rate and Rhythm: Rhythm irregular.   Pulmonary:      Breath sounds: Wheezing present.   Abdominal:      General: There is distension (firm).      Tenderness: There is no tenderness.   Musculoskeletal:      Right lower leg: No edema.   Skin:     General: Skin is warm and dry.      Coloration: Skin is pale.   Neurological:      Mental Status: He is alert and oriented to person, place, and time.   Psychiatric:         Mood and Affect: Mood normal.         Behavior: Behavior normal.         Thought Content: Thought content normal.         Judgment: Judgment normal.         Labs:          Recent Labs     01/23/20  1114 01/23/20 2026 01/24/20  0400   SODIUM 137 137 136   POTASSIUM 4.6 4.7 4.6   CHLORIDE 111 110 111   CO2 15* 15* 15*   BUN 82* 79* 84*   CREATININE 4.18* 4.21* 4.39*   MAGNESIUM  --   --  2.1   PHOSPHORUS  --   --  6.4*   CALCIUM 7.4* 7.6* 7.5*     Recent Labs     01/22/20  2230  01/23/20  1114 01/23/20 2026 01/24/20  0400   ALTSGPT <5  --  <5 5 5   ASTSGOT 8*  --  8* 8* 10*   ALKPHOSPHAT 52  --  49 52 53   TBILIRUBIN  0.5  --  1.2 0.8 0.5   LIPASE 13  --   --   --   --    GLUCOSE 111*   < > 79 87 116*    < > = values in this interval not displayed.     Recent Labs     20  0834   RBC 2.93*   < > 2.80* 2.78* 2.79*   HEMOGLOBIN 8.6*   < > 8.3* 7.9* 8.2*   HEMATOCRIT 26.5*   < > 25.7* 25.8* 25.1*   PLATELETCT 25*   < > 40* 35* 30*   PROTHROMBTM 17.0*  --   --   --   --    APTT 47.5*  --   --   --   --    INR 1.35*  --   --   --   --    IRON 121  --   --   --   --    FERRITIN 166.2  --   --   --   --    TOTIRONBC 125*  --   --   --   --     < > = values in this interval not displayed.     Recent Labs     20  0834   WBC 3.6* 8.1 7.3 5.8 4.9   NEUTSPOLYS 75.00* 89.60* 88.70* 88.70* 87.60*   LYMPHOCYTES 15.60* 4.70* 6.30* 5.20* 5.50*   MONOCYTES 7.50 4.80 4.40 5.20 5.90   EOSINOPHILS 0.80 0.20 0.10 0.00 0.20   BASOPHILS 0.30 0.00 0.10 0.20 0.00   ASTSGOT 8* 8*  --  10*  --    ALTSGPT <5 5  --  5  --    ALKPHOSPHAT 49 52  --  53  --    TBILIRUBIN 1.2 0.8  --  0.5  --      Hemodynamics:  Temp (24hrs), Av.8 °C (96.5 °F), Min:35.4 °C (95.7 °F), Max:36.2 °C (97.1 °F)  Temperature: (P) 35.8 °C (96.5 °F)  Pulse  Av.1  Min: 54  Max: 183   Blood Pressure : (P) 113/79     Respiratory:    Respiration: (P) 13, Pulse Oximetry: (P) 95 %, O2 Daily Delivery Respiratory : Nasal Cannula     Given By:: Mouthpiece  RUL Breath Sounds: (P) Expiratory Wheezes;Inspiratory Wheezes, RML Breath Sounds: (P) Expiratory Wheezes;Inspiratory Wheezes, RLL Breath Sounds: (P) Diminished, RA Breath Sounds: (P) Expiratory Wheezes;Inspiratory Wheezes, LLL Breath Sounds: (P) Diminished  Fluids:    Intake/Output Summary (Last 24 hours) at 2020 1039  Last data filed at 2020 1000  Gross per 24 hour   Intake 1868.97 ml   Output 2450 ml   Net -581.03 ml        GI/Nutrition:  Orders Placed This Encounter   Procedures   • Diet NPO     Standing  Status:   Standing     Number of Occurrences:   1     Order Specific Question:   Restrict to:     Answer:   Strict [1]     Medical Decision Making, by Problem:  Active Hospital Problems    Diagnosis   • *Melanotic stools [K92.1]   • Coagulopathy (HCC) [D68.9]   • GI bleed [K92.2]   • Bilateral pleural effusion [J90]   • Acute decompensated heart failure (HCC) [I50.9]   • Acute on chronic renal failure (HCC) [N17.9, N18.9]   • Pancytopenia (HCC) [D61.818]   • Hematuria [R31.9]   • Anasarca [R60.1]   • Anemia [D64.9]   • Left lower quadrant abdominal pain [R10.32]   • Other ascites [R18.8]       Plan:    1.  Melena and anemia:  Most likey source of anemia is the bladder, no further evidence since PTA of active GI bleeding.  Recommend serial trending of hgb, transfuse to keep above 7.  Can continue IV PPI BID for the time being.  Can consider removal of the NG tube.     2.  Ascites:  Diagnostic paracentesis to determine etiology should be performed when deemed hemodynamically stable and blood count improves.  Plan to evaluate the ascitic fluid for cytology, cell count, albumin, serum ascites albumin gradient, Gram stain.     3.  Hepatitis serologies, A, B, C all negative.    4.  Pancytopenia:  Bone marrow biopsy pending.       Quality-Core Measures

## 2020-01-24 NOTE — ASSESSMENT & PLAN NOTE
Given ascites, suspect some underlying liver  Check acute hepatitis panel, alcohol level  Will tap ascites when coagulopathy is corrected and send for gs/culture, albumin, cell count and cytology

## 2020-01-24 NOTE — PROGRESS NOTES
"MD paged. Patient stating that he no longer wishes to continue treatment and he \"wants to go\". Patient is A&Ox4. Discussed comfort care options vs treatment options. Pt stated he wants staff to stop all treatments and \"wants to die\".   "

## 2020-01-24 NOTE — ASSESSMENT & PLAN NOTE
- Nephrology consulted for new onset gross hematuria,   - had CBI through [facility].   - continue [facility], for Comfort.

## 2020-01-24 NOTE — ASSESSMENT & PLAN NOTE
LLQ pain, distended abd on exam. CT abd/pelvis- stable long segmental distal colonic wall thickening without bowel obstruction, suggests chronic inflammation of infectious favored over inflammatory causes.  Had been on :  - Empiric abx- ceftriaxone and metronidazole (colitis/diverticulitis)  - CTM for worsening distention, bowel movements/bowel regime  Then went COMFORT CARE.  - discontinued unnecessary treatments.

## 2020-01-25 NOTE — CARE PLAN
Problem: Pain Management  Goal: Pain level will decrease to patient's comfort goal  Outcome: PROGRESSING AS EXPECTED  Note:   Monitoring for signs of pain and administering medication per MAR.     Problem: Knowledge Deficit  Goal: Knowledge of disease process/condition, treatment plan, diagnostic tests, and medications will improve  Outcome: PROGRESSING AS EXPECTED  Note:   Talked w/ wife on the phone this am. Updated on poc and pt status. All questions/concerns addressed.

## 2020-01-25 NOTE — RESPIRATORY CARE
COPD EDUCATION by COPD CLINICAL EDUCATOR  1/25/2020 at 8:15 AM by Loyda Holland, RRT     Patient reviewed by COPD education team. Patient does not have a history or diagnosis of COPD and is a non-smoker, therefore does not qualify for the COPD program.

## 2020-01-25 NOTE — PROGRESS NOTES
[facility] ICU Progress Note      Admit Date: 1/22/2020    Resident(s): Elena Escalante M.D.  Attending: ARIEL THOMAS/ Dr. Cipriano Franco    Date & Time:   1/25/2020    4:30 PM      Patient ID:    Name:             Shawanda Daley     YOB: 1935  Age:                 84 y.o.  male   MRN:               [id number]    HPI:  Shawanda Daley Jr. Is an 84 year old male with past medical history of gastritis, H.pylori , CKD, mild aortic stenosis, pulmonary hypertension, BPH, renal stones who was transferred from [facility] for concerns of acute decompensated heart failure and GI bleed. Patient had presented with complaints of progressive generalized weakness, shortness of breath and abdominal pain. Patient also reported ongoing tarry stools for the last couple of months. Workup in [place] significant for glucose of 44 , hemoglobin 9 hematocrit 29.4 platelets 27 INR 1.2 BUN 85 creatinine 4.4 BNP 24,000 TSH 8.2 occult blood positive.  EKG sinus rhythm with rate of 64.  First degree AV block.  Patient given D50 and transferred to [facility] ED. In the ER, chest X ray showed right> left pleural effusion vs. Possible pneumonia, he was given a dose of 40 mg IV lasix with subsequent gross hematuria. Repeat hemoglobin dropped to 7.8. 1 unit PRBC stat was ordered and pending at the time of my exam. Patient was noted to be progressively hypotensive and desaturating to the 80s with any slight movement. Also hypoglycemic to the 50s. While in the ER, he was also noted to have an episode of syncope. TEG ordered, patient made NPO, GI and heme/onc consulted.     ICU team was consulted for patient's worsening hemodynamic status, hemoglobin dropped from 9-7.9, Hemoccult positive, new onset gross hematuria, pancytopenia platelets 26, worsening AK I on CKD and worsening shortness of breath.  Central line was placed for hemodynamic instability, and blood pressure support and left internal jugular vein.  Initial team had consulted  hematology and gastroenterology for their recommendations. Transfer orders were placed to ICU for higher level of care.     He had been started on protonix, octredotide, for presumed upper GI bleeding/melanotic stools, GI consulted for potential EGD/colonoscopy  And was on ceftriaxone and metronidazole, for possible abdominal infection / diverticulitis.  For hematuria, consulted urology, to see patient; and then consulted nephrology.  For renal failure, had been on LR at 76.  Given his anemia and low platelets, consulted heme/onc, ?DIC vs TTP vs ITP vs malignant process.  They did a bone biopsy (1/23/20, for possible MDS), and had ordered DIC labs, reviewed peripheral smear no schistocytes, Bili WNL. Unlikely intravascular hemolysis , LDH, Ret count, haptoglobin, KAYLEE ordered. Anemia : likely due to GI bleed with history of melena for last 2-3 months. Iron studies, ferritin , B12, folic acid ordered. Bone marrow biopsy to rule out primary bone marrow condition at his age myelodysplastic syndrome.       Interval Events:  - Patient was transitioned to COMFORT CARE per pt and family wishes on 1/24/20  - No overnight events, rested comfortably  - No further interventions/work-up   - Continue comfort care measures       Consultants:  ICU: Dr Cipriano Franco  GI:  Dr. Linn  Heme/Onc: Dr. Bates  Urology:  Dr. Mckeon  Nephrology:  Dr. Mendez      Review of Systems   Constitutional: Positive for malaise/fatigue and weight loss (3-40 lbs in past year). Negative for chills, diaphoresis and fever.   HENT: Positive for nosebleeds. Negative for congestion, ear discharge, ear pain, hearing loss, sinus pain, sore throat and tinnitus.    Eyes: Negative for blurred vision, double vision, photophobia and discharge.   Respiratory: Negative for cough, hemoptysis, sputum production, shortness of breath and wheezing.    Cardiovascular: Positive for leg swelling. Negative for chest pain, palpitations, orthopnea, claudication and PND.  "  Gastrointestinal: Negative for abdominal pain, blood in stool, constipation, diarrhea, heartburn, melena, nausea and vomiting.   Genitourinary: Positive for hematuria. Negative for dysuria, flank pain, frequency and urgency.   Musculoskeletal: Negative for back pain, joint pain, myalgias and neck pain.   Skin: Negative for itching and rash.   Neurological: Positive for weakness. Negative for dizziness, tingling, tremors, sensory change, speech change, focal weakness, seizures, loss of consciousness and headaches.   Endo/Heme/Allergies: Negative for environmental allergies and polydipsia.   Psychiatric/Behavioral: Negative for memory loss. The patient is not nervous/anxious and does not have insomnia.    All other systems reviewed and are negative.      PHYSICAL EXAM    Vitals:    01/24/20 1500 01/24/20 1930 01/24/20 2030 01/25/20 0100   BP:  (!) 61/48 (!) 86/52 (!) 91/59   Pulse: 83 (!) 59 63 60   Resp: (!) 24      Temp:  35.8 °C (96.5 °F)     TempSrc:  Temporal     SpO2: 90% 94% 96% 97%   Weight:       Height:         Body mass index is 22.96 kg/m².  BP (!) 91/59   Pulse 60   Temp 35.8 °C (96.5 °F) (Temporal)   Resp (!) 24   Ht 1.778 m (5' 10\")   Wt 72.6 kg (160 lb)   SpO2 97%   BMI 22.96 kg/m²   O2 therapy: Pulse Oximetry: 97 %, O2 (LPM): 4, O2 Delivery: None (Room Air)    Physical Exam   Constitutional: He is oriented to person, place, and time.   Cachectic and appears tired.   HENT:   Head: Normocephalic and atraumatic.   Eyes: Pupils are equal, round, and reactive to light. EOM are normal. No scleral icterus.   Neck: No tracheal deviation present.   Cardiovascular: Normal rate, regular rhythm and intact distal pulses. Exam reveals no gallop and no friction rub.   Murmur heard.  Pulmonary/Chest: No stridor. He has rales (questionable). He exhibits no tenderness.   Shallow breathing, making evaluation unreliable.   On oxygen.    Abdominal: He exhibits distension (mild). There is tenderness. There is no " rebound and no guarding.   Very, very rare bowel sounds.   Musculoskeletal:         General: Edema (trace) present. No tenderness.   Neurological: He is alert and oriented to person, place, and time.   Skin: No rash noted. He is not diaphoretic. No pallor.   Psychiatric: Mood, memory, affect and judgment normal.   Nursing note and vitals reviewed.      Respiratory:     Respiration: (!) 24, Pulse Oximetry: 97 %    Chest Tube Drains:  None        HemoDynamics:  Pulse: 60 Blood Pressure : (!) 91/59      Neuro:  A&Ox4    Fluids:       Intake/Output Summary (Last 24 hours) at 2020 0740  Last data filed at 2020 0430  Gross per 24 hour   Intake 308.26 ml   Output 675 ml   Net -366.74 ml            Body mass index is 22.96 kg/m².    Recent Labs     20  11120  0400   SODIUM 137 137 136   POTASSIUM 4.6 4.7 4.6   CHLORIDE 111 110 111   CO2 15* 15* 15*   BUN 82* 79* 84*   CREATININE 4.18* 4.21* 4.39*   MAGNESIUM  --   --  2.1   PHOSPHORUS  --   --  6.4*   CALCIUM 7.4* 7.6* 7.5*       GI/Nutrition:  Recent Labs     20  11120  0400   ALTSGPT <5  --  <5 5 5   ASTSGOT 8*  --  8* 8* 10*   ALKPHOSPHAT 52  --  49 52 53   TBILIRUBIN 0.5  --  1.2 0.8 0.5   LIPASE 13  --   --   --   --    GLUCOSE 111*   < > 79 87 116*    < > = values in this interval not displayed.       Heme:  Recent Labs     20  1114  20  0010 20  0400 20  0834   RBC 2.93*   < > 2.80* 2.78* 2.79*   HEMOGLOBIN 8.6*   < > 8.3* 7.9* 8.2*   HEMATOCRIT 26.5*   < > 25.7* 25.8* 25.1*   PLATELETCT 25*   < > 40* 35* 30*   PROTHROMBTM 17.0*  --   --   --   --    APTT 47.5*  --   --   --   --    INR 1.35*  --   --   --   --    IRON 121  --   --   --   --    FERRITIN 166.2  --   --   --   --    TOTIRONBC 125*  --   --   --   --     < > = values in this interval not displayed.       Infectious Disease:  Temp  Av.8 °C (96.4 °F)  Min: 35.7 °C (96.3 °F)  Max: 35.8 °C (96.5  °F)  Recent Labs     01/23/20  1114 01/23/20 2026 01/24/20  0010 01/24/20  0400 01/24/20  0834   WBC 3.6* 8.1 7.3 5.8 4.9   NEUTSPOLYS 75.00* 89.60* 88.70* 88.70* 87.60*   LYMPHOCYTES 15.60* 4.70* 6.30* 5.20* 5.50*   MONOCYTES 7.50 4.80 4.40 5.20 5.90   EOSINOPHILS 0.80 0.20 0.10 0.00 0.20   BASOPHILS 0.30 0.00 0.10 0.20 0.00   ASTSGOT 8* 8*  --  10*  --    ALTSGPT <5 5  --  5  --    ALKPHOSPHAT 49 52  --  53  --    TBILIRUBIN 1.2 0.8  --  0.5  --        Meds:  • MD ALERT...adult comfort care       • atropine  2 Drop     • acetaminophen  650 mg      Or   • acetaminophen  650 mg     • morphine injection  2-5 mg     • LORazepam  0.5 mg      Or   • LORazepam  0.5 mg     • morphine injection  0.5 mg     • ondansetron  4 mg     • ondansetron  4 mg          Procedures:  Central line- 1/23/20  Bone biopsy - 1/23/20.    Imaging:  DX-ABDOMEN FOR TUBE PLACEMENT   Final Result      NG tube has been advanced, now terminates over the stomach      Ascites      DX-ABDOMEN FOR TUBE PLACEMENT   Final Result      Enteric tube terminates high, over the distal esophagus. This would benefit from being advanced 10 cm      Ascites      DX-CHEST-FOR LINE PLACEMENT Perform procedure in: Patient's Room   Final Result      1.  Interval insertion of central venous catheter which terminates with the tip projecting over the expected region of the mid to distal superior vena cava.   2.  Stable enlargement of the cardiomediastinal silhouette.   3.  Stable right pleural effusion.   4.  Stable bilateral basilar atelectasis and/or consolidation. Underlying infection is possible.      EC-ECHOCARDIOGRAM COMPLETE W/O CONT   Final Result      US-RENAL   Final Result      Medium-large volume ascites and pleural fluid      Echogenic material in the bladder surrounds the Fox catheter, likely indicating hemorrhage      Borderline hydronephrosis      Echogenic renal cortices, compatible with medical renal disease      CT-ABDOMEN-PELVIS W/O   Final Result       1.  New anasarca with medium-large pleural effusions, ascites and diffuse fat stranding making detection of inflammation insensitive      2.  Ascites is low density suggesting it is not hemorrhagic      3.  Stable long segmental distal colonic wall thickening without bowel obstruction. This suggests chronic inflammation of infectious favored over inflammatory causes.      4.  4.1 cm ascending aortic aneurysm      5.  Severe coronary artery disease, aortic valve calcification indicating stenosis, left hepatic cyst and/or hemangioma and remote granulomatous disease as before      DX-CHEST-PORTABLE (1 VIEW)   Final Result      Enlarging right greater than left pleural effusions, worsening basilar atelectasis. Consolidation not excluded          Problem and Plan:    * Melanotic stools  Assessment & Plan  Several month hx of post-prandial abdominal pain and dark tarry stools  No masses noted on CT abd/pelvis concerning for malignancy  Extensive arterial calcifications noted throughout visceral vasculature  Chronic mesenteric ischemia? Possible upper GI bleed? Peptic ulcer disease?   Unclear etiology  Had been on :  - Protonix 40mg BID   - Discuss consulting GI for EGD/Colonoscopy  Then went COMFORT CARE.  - discontinued unnecessary treatments.     GI bleed  Assessment & Plan  ICU team was consulted for patient's worsening hemodynamic status, hemoglobin dropped from 9-7.9, Hemoccult positive, new onset gross hematuria, pancytopenia platelets 26, worsening AK I on CKD and worsening shortness of breath.  Central line was placed for hemodynamic instability, and blood pressure support and left internal jugular vein.  Initial team had consulted hematology and gastroenterology for their recommendations. Transfer orders were placed to ICU for higher level of care.  Had been on :  - Protonix 40mg BID   - Oceteotride  -For presumed upper GI bleeding/melanotic stools, GI consulted for potential EGD/colonoscopy   Then went  COMFORT CARE.  - discontinued unnecessary treatments.     Hematuria  Assessment & Plan  - Nephrology consulted for new onset gross hematuria,   - had CBI through sousa.   - continue sousa, for Comfort.     Pancytopenia (HCC)  Assessment & Plan  Pancytopenic w/ leukopenia, anemia, and thrombocytopenia  Myeloproliferative disease? MGUS? Multiple myeloma?  Unclear etiology  Thrombocytopenic w/ bleeding from gastrointestinal tract, nose, and genitourinary tract  Anemic w/ drop in Hemoglobin <8.0  Ordered for 1u pRBC   Had been on :   - Transfuse pRBCs for Hgb<8.0  - Trend H/H q6hrs  - Discuss consulting hematology for bone marrow biopsy  - Discuss transfusing plts <30,000 given active bleeds  - Discuss consulting palliative care  - SPEP with M-spike in Gamma region, with IgG type Kappa monoclonal protein, and additional band of IgA Lambda.   - H/O had done bone biopsy (1/23/20).   Then went COMFORT CARE.  - discontinued unnecessary treatments.       Acute on chronic renal failure (HCC)  Assessment & Plan  Cardiorenal? Pre-renal? Likely cardiorenal. Baseline Cr 2.5-3.2. Serum creatinine >4.0 on presentation. Administered 40mg IV Lasix in ED.  Had been on :  - Follow up urine electrolytes  - Sousa catheter in place  - Strict I/Os  - Likely pre-renal due to bleed, decreased po intake. Nephrology consult recommended per heme/onc  Then went COMFORT CARE.  - discontinued unnecessary treatments.     Acute decompensated heart failure (HCC)  Assessment & Plan  Profoundly elevated BNP of 24,000 at [facility] w/ worsening shortness of breath, orthopnea, bilateral pleural effusions, and anasarca  Last TTE in 07/2019 demonstrated LVEF of 65% w/o major structural defects  Diffuse coronary calcifications noted on CT chest  No prior documented hx of cardiomyopathy, cardiac ischemic, ACS, etc  Volume overloaded state peripherally due to poor pump function  Had been on :  - One time dose of Lasix 40mg administered in  ED   - TTE was ordered, f/u  - Could consider consulting cardiology  - Follow up EKG  Then went COMFORT CARE.  - discontinued unnecessary treatments.     Bilateral pleural effusion  Assessment & Plan  Large bilateral pleural effusions, transudative in appearance on CT  Likely secondary to pump failure in setting of elevated BNP.  Chest X ray showed right> left pleural effusion vs. Possible pneumonia  Had been on :  - Hold diuresis at this time given volume depleted clinical picture  - Will need thoracentesis for pleural effusions  - Empiric abx- ceftriaxone and metronidazole (colitis/diverticulitis)  - Continuous pulse oximetry  Then went COMFORT CARE.  - discontinued unnecessary treatments.     Left lower quadrant abdominal pain  Assessment & Plan  LLQ pain, distended abd on exam. CT abd/pelvis- stable long segmental distal colonic wall thickening without bowel obstruction, suggests chronic inflammation of infectious favored over inflammatory causes.  Had been on :  - Empiric abx- ceftriaxone and metronidazole (colitis/diverticulitis)  - CTM for worsening distention, bowel movements/bowel regime  Then went COMFORT CARE.  - discontinued unnecessary treatments.     Anasarca  Assessment & Plan  Bilateral pleural effusions w/ ascites w/ bilateral lower extremity pitting edema  Serum albumin 2.7, not that low  Liver function WNL  Likely secondary to pump failure  Had been on :  - Administered 40mg of Lasix in ED  - Hold diuresis at this time given volume depletion clinical picture  - Tap abdomen/chest as needed for therapeutic/diagnostic purposes  - Not witnessed on exam on 1/24/20.  Then went COMFORT CARE.  - discontinued unnecessary treatments.       DISPO:   COMFORT CARE - transfer to floor.     CODE STATUS: COMFORT CARE    Quality Measures:  Fox Catheter:  Yes  (continue for patient comfort)  DVT Prophylaxis: SCDs- stopped.  Ulcer Prophylaxis: Protonix- stopped, can resume if GERD   Antibiotics: Ceftriaxone and  Metronidazole ... stopped for comfort care.   Lines: Left IJ central line, 7 [place] triple lumen

## 2020-01-25 NOTE — PROGRESS NOTES
Comfort care measures initiated per MD order. Patient understands plan of care and verbalizes understanding.

## 2020-01-25 NOTE — DIETARY
Nutrition Services:  Brief Update    RD previously following pt for diet advancement and adequate PO intake.    Per chart review, pt has transitioned to comfort care.    RD will re-screen pt weekly.  Consult RD as needed.    RD available PRN.

## 2020-01-25 NOTE — PROGRESS NOTES
[facility] ICU Transfer Note                                                                                         ICU Admit Date: 1/22/2020     ICU Discharge Date:  01/24/20        Primary Diagnosis:   Melanotic stools        ICU Course Summary (Brief Narrative):  Shawanda Daley Jr. Is an 84 year old male with past medical history of gastritis, H.pylori , CKD, mild aortic stenosis, pulmonary hypertension, BPH, renal stones who was transferred from [facility] for concerns of acute decompensated heart failure and GI bleed. Patient had presented with complaints of progressive generalized weakness, shortness of breath and abdominal pain. Patient also reported ongoing tarry stools for the last couple of months. Workup in [place] significant for glucose of 44 , hemoglobin 9 hematocrit 29.4 platelets 27 INR 1.2 BUN 85 creatinine 4.4 BNP 24,000 TSH 8.2 occult blood positive.  EKG sinus rhythm with rate of 64.  First degree AV block.  Patient given D50 and transferred to [facility] ED. In the ER, chest X ray showed right> left pleural effusion vs. Possible pneumonia, he was given a dose of 40 mg IV lasix with subsequent gross hematuria. Repeat hemoglobin dropped to 7.8. 1 unit PRBC stat was ordered and pending at the time of my exam. Patient was noted to be progressively hypotensive and desaturating to the 80s with any slight movement. Also hypoglycemic to the 50s. While in the ER, he was also noted to have an episode of syncope. TEG ordered, patient made NPO, GI and heme/onc consulted.      ICU team was consulted for patient's worsening hemodynamic status, hemoglobin dropped from 9-7.9, Hemoccult positive, new onset gross hematuria, pancytopenia platelets 26, worsening AK I on CKD and worsening shortness of breath.  Central line was placed for hemodynamic instability, and blood pressure support and left internal jugular vein.  Initial team had consulted hematology and gastroenterology for their recommendations.  Transfer orders were placed to ICU for higher level of care.     He had been started on protonix, octredotide, for presumed upper GI bleeding/melanotic stools, GI consulted for potential EGD/colonoscopy  And was on ceftriaxone and metronidazole, for possible abdominal infection / diverticulitis.  For hematuria, consulted urology, to see patient; and then consulted nephrology.  For renal failure, had been on LR at 76.  Given his anemia and low platelets, consulted heme/onc, ?DIC vs TTP vs ITP vs malignant process.  They did a bone biopsy (1/23/20, for possible MDS), and had ordered DIC labs, reviewed peripheral smear no schistocytes, Bili WNL. Unlikely intravascular hemolysis , LDH, Ret count, haptoglobin, KAYLEE ordered. Anemia : likely due to GI bleed with history of melena for last 2-3 months. Iron studies, ferritin , B12, folic acid ordered. Bone marrow biopsy to rule out primary bone marrow condition at his age myelodysplastic syndrome.       Recent Event:  In the morning, he was denying any pains, but was tired, and difficult to speak loudly/clearly.  In the afternoon (1/24/20), he was able to speak clearer, and clearly stated his wishes that he did not want any further care.  He stated he had spoken to his wife earlier, and they both agreed that he should go comfort care.  He had previously been DNR/DNI.  He expressed that he understood the consequences of comfort care, and that he may (and likely would) die, without treatment.  After explaining the various options for care, and limited care, he chose to go COMFORT CARE.         Consultants:  ICU: Dr Cipriano Franco  GI:  Dr. Linn  Heme/Onc: Dr. Bates  Urology:  Dr. Mckeon  Nephrology:  Dr. Mendez        Important Events in the ICU:   - Central Line:  Yes - left IJ  - Intubation: No  - Pressors: No   - Fox catheter:  Yes   - Tube feeding: None  - Antibiotics: None   - Other procedures: NG Tube         Labs and imaging studies to be continued with their  indications:  - NO LABS or unnecessary testing.   COMFORT CARE        Things to follow:   -  COMFORT CARE

## 2020-01-26 NOTE — DISCHARGE SUMMARY
Internal Medicine Death Summary  Note Author: Elena Escalante M.D.       Admit Date:  1/22/2020       Date of Death:   1/26/2020    Service:   [facility] Internal Medicine [facility] Team  Attending Physician(s):  Dr Cipriano Franco  Senior Resident(s):   Dr Elena Escalante      Cause of Death:   Acute cardiopulmonary arrest    Diagnoses:            Principal Problem:    Melanotic stools POA: Unknown  Active Problems:    Coagulopathy (HCC) POA: Unknown    GI bleed POA: Unknown    Bilateral pleural effusion POA: Unknown    Acute decompensated heart failure (HCC) POA: Unknown    Acute on chronic renal failure (HCC) POA: Unknown    Pancytopenia (HCC) POA: Unknown    Hematuria POA: Unknown    Anemia POA: Unknown    Left lower quadrant abdominal pain POA: Unknown    Other ascites POA: Unknown    Anasarca POA: Unknown  Resolved Problems:    * No resolved hospital problems. *      Hospital Summary (Brief Narrative):         Shawanda Daley Jr. Is an 84 year old male with past medical history of gastritis, H.pylori , CKD, mild aortic stenosis, pulmonary hypertension, BPH, renal stones who was transferred from [facility] for concerns of acute decompensated heart failure and GI bleed. Patient had presented with complaints of progressive generalized weakness, shortness of breath and abdominal pain. Patient also reported ongoing tarry stools for the last couple of months. Workup in [place] significant for glucose of 44 , hemoglobin 9 hematocrit 29.4 platelets 27 INR 1.2 BUN 85 creatinine 4.4 BNP 24,000 TSH 8.2 occult blood positive.  EKG sinus rhythm with rate of 64.  First degree AV block.  Patient given D50 and transferred to [facility] ED. In the ER, chest X ray showed right> left pleural effusion vs. Possible pneumonia, he was given a dose of 40 mg IV lasix with subsequent gross hematuria. Repeat hemoglobin dropped to 7.8. 1 unit PRBC stat was ordered and pending at the time of my exam. Patient was noted to be progressively  hypotensive and desaturating to the 80s with any slight movement. Also hypoglycemic to the 50s. While in the ER, he was also noted to have an episode of syncope. TEG ordered, patient made NPO, GI and heme/onc consulted.      ICU team was consulted for patient's worsening hemodynamic status, hemoglobin dropped from 9-7.9, Hemoccult positive, new onset gross hematuria, pancytopenia platelets 26, worsening AK I on CKD and worsening shortness of breath.  Central line was placed for hemodynamic instability, and blood pressure support and left internal jugular vein.  Initial team had consulted hematology and gastroenterology for their recommendations. Transfer orders were placed to ICU for higher level of care. He had been started on protonix, octredotide, for presumed upper GI bleeding/melanotic stools, GI consulted for potential EGD/colonoscopy  And was on ceftriaxone and metronidazole, for possible abdominal infection / diverticulitis.  For hematuria, consulted urology, to see patient; and then consulted nephrology.  For renal failure, had been on LR at 75.  Given his anemia and low platelets, consulted heme/onc, ?DIC vs TTP vs ITP vs malignant process.  They did a bone biopsy (1/23/20, for possible MDS), and had ordered DIC labs, reviewed peripheral smear no schistocytes, Bili WNL. Unlikely intravascular hemolysis , LDH, Ret count, haptoglobin, KAYLEE ordered. Anemia : likely due to GI bleed with history of melena for last 2-3 months. Iron studies, ferritin , B12, folic acid ordered. Bone marrow biopsy to rule out primary bone marrow condition at his age myelodysplastic syndrome.     On 1/24/20 patient was transitioned to COMFORT CARE, after he stated his wishes that he did not want any further care.  He stated he had spoken to his wife earlier on 1/24/20, and they both agreed that he should go comfort care. He had previously been DNR/DNI.  He expressed that he understood the consequences of comfort care, and that he may  (and likely would) die, without treatment. His ICU course thereafter was uneventful, all further interventions/work-up was discontinued after transitioning to comfort care measures. Transfer orders were placed to medical floor. On 20 at 12:30am, [facility] team was paged that patient had , and was pronounced dead by two RN protocol.           Patient /Hospital Summary (Details -- Problem Oriented) :          GI bleed  Assessment & Plan  ICU team was consulted for patient's worsening hemodynamic status, hemoglobin dropped from 9-7.9, Hemoccult positive, new onset gross hematuria, pancytopenia platelets 26, worsening AK I on CKD and worsening shortness of breath.  Central line was placed for hemodynamic instability, and blood pressure support and left internal jugular vein.  Initial team had consulted hematology and gastroenterology for their recommendations. Transfer orders were placed to ICU for higher level of care.  Had been on :  - Protonix 40mg BID   - Oceteotride  -For presumed upper GI bleeding/melanotic stools, GI consulted for potential EGD/colonoscopy   Then went COMFORT CARE.  - discontinued unnecessary treatments.     * Melanotic stools  Assessment & Plan  Several month hx of post-prandial abdominal pain and dark tarry stools  No masses noted on CT abd/pelvis concerning for malignancy  Extensive arterial calcifications noted throughout visceral vasculature  Chronic mesenteric ischemia? Possible upper GI bleed? Peptic ulcer disease?   Unclear etiology  Had been on :  - Protonix 40mg BID   - Discuss consulting GI for EGD/Colonoscopy  Then went COMFORT CARE.  - discontinued unnecessary treatments.     Hematuria  Assessment & Plan  - Nephrology consulted for new onset gross hematuria,   - had CBI through [facility].   - continue [facility]a, for Comfort.     Pancytopenia (HCC)  Assessment & Plan  Pancytopenic w/ leukopenia, anemia, and thrombocytopenia  Myeloproliferative disease? MGUS? Multiple  myeloma?  Unclear etiology  Thrombocytopenic w/ bleeding from gastrointestinal tract, nose, and genitourinary tract  Anemic w/ drop in Hemoglobin <8.0  Ordered for 1u pRBC   Had been on :   - Transfuse pRBCs for Hgb<8.0  - Trend H/H q6hrs  - Discuss consulting hematology for bone marrow biopsy  - Discuss transfusing plts <30,000 given active bleeds  - Discuss consulting palliative care  - SPEP with M-spike in Gamma region, with IgG type Kappa monoclonal protein, and additional band of IgA Lambda.   - H/O had done bone biopsy (1/23/20).   Then went COMFORT CARE.  - discontinued unnecessary treatments.       Acute on chronic renal failure (HCC)  Assessment & Plan  Cardiorenal? Pre-renal? Likely cardiorenal. Baseline Cr 2.5-3.2. Serum creatinine >4.0 on presentation. Administered 40mg IV Lasix in ED.  Had been on :  - Follow up urine electrolytes  - Fox catheter in place  - Strict I/Os  - Likely pre-renal due to bleed, decreased po intake. Nephrology consult recommended per heme/onc  Then went COMFORT CARE.  - discontinued unnecessary treatments.     Acute decompensated heart failure (HCC)  Assessment & Plan  Profoundly elevated BNP of 24,000 at [facility] w/ worsening shortness of breath, orthopnea, bilateral pleural effusions, and anasarca  Last TTE in 07/2019 demonstrated LVEF of 65% w/o major structural defects  Diffuse coronary calcifications noted on CT chest  No prior documented hx of cardiomyopathy, cardiac ischemic, ACS, etc  Volume overloaded state peripherally due to poor pump function  Had been on :  - One time dose of Lasix 40mg administered in ED   - TTE was ordered, f/u  - Could consider consulting cardiology  - Follow up EKG  Then went COMFORT CARE.  - discontinued unnecessary treatments.     Bilateral pleural effusion  Assessment & Plan  Large bilateral pleural effusions, transudative in appearance on CT  Likely secondary to pump failure in setting of elevated BNP.  Chest X ray  showed right> left pleural effusion vs. Possible pneumonia  Had been on :  - Hold diuresis at this time given volume depleted clinical picture  - Will need thoracentesis for pleural effusions  - Empiric abx- ceftriaxone and metronidazole (colitis/diverticulitis)  - Continuous pulse oximetry  Then went COMFORT CARE.  - discontinued unnecessary treatments.     Left lower quadrant abdominal pain  Assessment & Plan  LLQ pain, distended abd on exam. CT abd/pelvis- stable long segmental distal colonic wall thickening without bowel obstruction, suggests chronic inflammation of infectious favored over inflammatory causes.  Had been on :  - Empiric abx- ceftriaxone and metronidazole (colitis/diverticulitis)  - CTM for worsening distention, bowel movements/bowel regime  Then went COMFORT CARE.  - discontinued unnecessary treatments.     Anasarca  Assessment & Plan  Bilateral pleural effusions w/ ascites w/ bilateral lower extremity pitting edema  Serum albumin 2.7, not that low  Liver function WNL  Likely secondary to pump failure  Had been on :  - Administered 40mg of Lasix in ED  - Hold diuresis at this time given volume depletion clinical picture  - Tap abdomen/chest as needed for therapeutic/diagnostic purposes  - Not witnessed on exam on 1/24/20.  Then went COMFORT CARE.  - discontinued unnecessary treatments.         Consultants:     ICU: Dr Cipriano Franco  GI:  Dr. Linn  Heme/Onc: Dr. Bates  Urology:  Dr. Mckeon  Nephrology:  Dr. Mendez    Procedures:        Central line- 1/23/20  Bone biopsy - 1/23/20    Imaging/ Testing:      DX-ABDOMEN FOR TUBE PLACEMENT   Final Result       NG tube has been advanced, now terminates over the stomach       Ascites       DX-ABDOMEN FOR TUBE PLACEMENT   Final Result       Enteric tube terminates high, over the distal esophagus. This would benefit from being advanced 10 cm       Ascites       DX-CHEST-FOR LINE PLACEMENT Perform procedure in: Patient's Room   Final Result       1.   Interval insertion of central venous catheter which terminates with the tip projecting over the expected region of the mid to distal superior vena cava.   2.  Stable enlargement of the cardiomediastinal silhouette.   3.  Stable right pleural effusion.   4.  Stable bilateral basilar atelectasis and/or consolidation. Underlying infection is possible.       EC-ECHOCARDIOGRAM COMPLETE W/O CONT   Final Result       US-RENAL   Final Result       Medium-large volume ascites and pleural fluid       Echogenic material in the bladder surrounds the Fox catheter, likely indicating hemorrhage       Borderline hydronephrosis       Echogenic renal cortices, compatible with medical renal disease       CT-ABDOMEN-PELVIS W/O   Final Result       1.  New anasarca with medium-large pleural effusions, ascites and diffuse fat stranding making detection of inflammation insensitive       2.  Ascites is low density suggesting it is not hemorrhagic       3.  Stable long segmental distal colonic wall thickening without bowel obstruction. This suggests chronic inflammation of infectious favored over inflammatory causes.       4.  4.1 cm ascending aortic aneurysm       5.  Severe coronary artery disease, aortic valve calcification indicating stenosis, left hepatic cyst and/or hemangioma and remote granulomatous disease as before       DX-CHEST-PORTABLE (1 VIEW)   Final Result       Enlarging right greater than left pleural effusions, worsening basilar atelectasis. Consolidation not excluded

## 2020-01-28 NOTE — DOCUMENTATION QUERY
[facility]                                                                       Query Response Note      PATIENT:               WILLIAM SANTACRUZ  ACCT #:                  [id number]  MRN:                     [id number]  :                      1935  ADMIT DATE:       2020 9:41 PM  DISCH DATE:        2020 12:21 AM  RESPONDING  PROVIDER #:        [id number]           QUERY TEXT:    Acute Decompensated Heart Failure is documented in the Medical Record. Please document the type (includes probable or suspected)    NOTE:  If an appropriate response is not listed below, please respond with a new note.      The patient's Clinical Indicators include:  NT-proBNP 41333    CXR : Enlarging right greater than left pleural effusions, worsening basilar atelectasis. Consolidation not excluded    Echo:   pericardial effusion slightly larger but still small, pleural effusion and ascites present.   EF 65%  Grade II diastolic dysfunction.   Moderate aortic stenosis.   Mild mitral regurgitation.   Moderate tricuspid regurgitation  Estimated RVSP 45 mmHg    Per Progress Notes   -Profoundly elevated BNP of 24,000 at [facility]  -w/ worsening shortness of breath, orthopnea, bilateral pleural effusions, and anasarca  -Diffuse coronary calcifications noted on CT chest   -No prior documented hx of cardiomyopathy, cardiac ischemic, ACS, etc    Treatment:   IV Lasix, echocardiogram, CXR, NT -proBNP, daily weight, I&O, supplemental O2, & Comfort Care measures    Risk Factors:   Aortic stenosis, pulmonary hypertension, bilateral pleural effusions, ascites, & acute on chronic renal failure  Options provided:   -- Acute Systolic heart failure   -- Acute on Chronic Systolic heart failure   -- Acute Diastolic heart failure   -- Acute on Chronic Diastolic heart failure   -- Acute Systolic and Diastolic heart failure   -- Acute on Chronic Systolic  and Diastolic heart failure   -- Heart Failure ruled out   -- Unable to determine      Query created by: Yoselin Rasheed on 1/27/2020 1:30 PM    RESPONSE TEXT:    Acute Systolic heart failure       QUERY TEXT:    Severe Malnutrition is noted in the Dietitian Consultation.  Can a diagnosis be provided to support this finding?     NOTE:  If an appropriate response is not listed below, please respond with a new note.    The patient's Clinical Indicators include:  Per Dietitian Consultation  -severe chronic illness related malnutrition secondary to failure to thrive, COPD  -as evidenced  by consuming 1/4 of usual PO intake x 6 months per pt verbalization   -and severe fat loss and severe muscle loss (pronounced hollowness of temples, depression in cheeks, sunken eyes, prominent brow bone, squaring of shoulders).     Per H&P  -Substantial postprandial abdominal pain leading to anorexia w/ 30-40lb unintentional weight loss    Treatment:   Dietitian Consultation & Comfort Care measures    Risk Factors:   Postprandial abdominal pain, anorexia, ascites, bilateral pleural effusions, melanotic stools, & pancytopenia  Options provided:   -- Agree with Dietitian finding of severe protein calorie malnutrition   -- Disagree with Dietitian finding of severe protein calorie malnutrition   -- Unable to determine      Query created by: Yoselin Rasheed on 1/27/2020 1:39 PM    RESPONSE TEXT:    Agree with Dietitian finding of severe protein calorie malnutrition       QUERY TEXT:    Anemia due to GI bleed and hematuria is documented in the Medical Record.  Please specify the acuity of the blood loss.     NOTE:  If an appropriate response is not listed below, please respond with a new note.    The patient's Clinical Indicators include:  Hg 8.5, 7.8, 7.9, 8.6, 8.4, 8.3, 7.9, 8.2    Per Hematology/Oncology Progress Notes  -Anemia - multifactorial  - decrease due to acute GI bleed and also hematuria    Per GI Progress Notes  -Melena and  anemia:  Most likely source of anemia is the bladder  -no further evidence since PTA of active GI     Per Resident Progress Notes 1/23  -hemoglobin dropped from 9-7.9, Hemoccult positive, new onset gross hematuria, pancytopenia platelets 26, worsening SHIRA    Per Resident Progress notes 1/26  -Anemia : likely  due to GI bleed with history of melena for last 2-3 months    Treatment:   Monitor H/H, iron studies, Transfuse 1 unit PRBCs, Transfuse 3 units Platelets, CBI, IV Protonix, & Urology & GI Consultations    Risk Factors:   Melena, gross hematuria, pancytopenia, coagulopathy, & concern for malignant process  Options provided:   -- Acute blood loss anemia   -- Chronic blood loss   -- Unable to determine      Query created by: Yoselin Rasheed on 1/27/2020 1:57 PM    RESPONSE TEXT:    Acute blood loss anemia          Electronically signed by:  VALENTIN PHILLIPS DO 1/28/2020 11:18 AM

## 2020-01-29 NOTE — DOCUMENTATION QUERY
[facility]                                                                       Query Response Note      PATIENT:               WILLIAM SANTACRUZ  ACCT #:                  [id number]  MRN:                     [id number]  :                      1935  ADMIT DATE:       2020 9:41 PM  DISCH DATE:        2020 12:21 AM  RESPONDING  PROVIDER #:        [id number]           QUERY TEXT:    Vasopressor therapy was utilized in the treatment plan. Can a diagnosis be provided to support this intervention?      NOTE:  If an appropriate response is not listed below, please respond with a new note.    The patient's Clinical Indicators include:  Per Progress Note   -ICU team was consulted for patient's worsening hemodynamic status,   -hemoglobin dropped from 9-7.9,   -Hemoccult positive,   -new onset gross hematuria,   -pancytopenia platelets 26,   -worsening SHIRA on CKD,   -& worsening shortness of breath.    -Central line was placed for hemodynamic instability, & blood pressure support    Per Progress Notes  He requires vasopressor therapy to maintain MAP    Treatment:   Transfer to ICU, central line placement, Levophed, Dextrose 10% 250ml bolus, IV NS bolus 1000ml, & continuous LR at 75ml/hr, midodrine, & comfort care measures     Risk Factors:   Acute decompensated heart failure, GI bleed, gross hematuria, pancytopenia, hypoglycemia, acute on chronic renal failure, coagulopathy, ascites, & concern for malignant process  Options provided:   -- Hypovolemic shock   -- Hemorrhagic  shock   -- Other shock, (please specify other type of shock)   -- Hypotension   -- Unable to determine      Query created by: Yoselin Rasheed on 2020 2:24 PM    RESPONSE TEXT:    Hemorrhagic shock          Electronically signed by:  VALENTIN PHILLIPS DO 2020 10:36 AM

## 2022-11-08 NOTE — ASSESSMENT & PLAN NOTE
[employer] D[employer]  : 1983    Chief Complaint:     Chief Complaint   Patient presents with    Medication Refill     He feels sick, has been like this for a week. HPI  Viral illness for about 1 week. Picked up from his kids. Had nausea at onset. Symptoms mainly resolved. Return of allergy symptoms. Flaring his psoriasis more. Using topical steroid cream.   Does not like the Abilify anymore. Too sedating. Would like to try something different. IT was controlling mood well. Reviewed last labs and . Reviewed ideal to start statin to prevent early plaque buildup. Drinking on weekends only now. Keeps it light, selzter or something. Drinks only 1-2 per day and cuts himself off after this, he feels he can manage this safely. Past medical, surgical, family and social histories reviewed and updated today as appropriate    Health Maintenance Due   Topic Date Due    Varicella vaccine (1 of 2 - 2-dose childhood series) Never done    HIV screen  Never done    Hepatitis C screen  Never done    DTaP/Tdap/Td vaccine (1 - Tdap) Never done    COVID-19 Vaccine (3 - Booster for Pfizer series) 2021    Lipids  2022    Flu vaccine (1) 2022       Current Outpatient Medications   Medication Sig Dispense Refill    cariprazine hcl (VRAYLAR) 1.5 MG capsule Take 1 capsule by mouth daily 30 capsule 1    lisinopril (PRINIVIL;ZESTRIL) 10 MG tablet take 1 tablet by mouth once daily 90 tablet 1    rosuvastatin (CRESTOR) 10 MG tablet Take 1 tablet by mouth daily 90 tablet 1    loratadine (CLARITIN) 10 MG tablet Take 1 tablet by mouth daily 90 tablet 1    FLUoxetine (PROZAC) 10 MG capsule Take 1 capsule by mouth daily 30 capsule 3    Multiple Vitamin (MULTIVITAMIN ADULT PO) Take by mouth      clobetasol (TEMOVATE) 0.05 % cream Apply to the affected area twice per day 45 g 2     No current facility-administered medications for this visit.        No Known Allergies      REVIEW OF SYSTEMS  Review of On protonix 40 BID.   Close monitoring of CBC. H/H appears stable.   NG tube output without any evidence of upper GI bleed. No coffee ground or blood. Bilious output  Continue low suction    Systems    PHYSICAL EXAM  /88   Pulse (!) 101   Temp 98.5 °F (36.9 °C)   Resp 16   Ht 6' (1.829 m)   Wt 198 lb (89.8 kg)   SpO2 98%   BMI 26.85 kg/m²   Physical Exam      The ASCVD Risk score (Roosevelt DK, et al., 2019) failed to calculate for the following reasons: The 2019 ASCVD risk score is only valid for ages 36 to 78    ASSESSMENT/PLAN:    1. Mixed hyperlipidemia  -     rosuvastatin (CRESTOR) 10 MG tablet; Take 1 tablet by mouth daily, Disp-90 tablet, R-1Normal  2. Psoriasis  -     loratadine (CLARITIN) 10 MG tablet; Take 1 tablet by mouth daily, Disp-90 tablet, R-1Normal  3. Mood disorder (HCC)  -     cariprazine hcl (VRAYLAR) 1.5 MG capsule; Take 1 capsule by mouth daily, Disp-30 capsule, R-1Normal  4. ROYCE (generalized anxiety disorder)  5. Alcohol use disorder, severe, in early remission ([facility] [address]  6. Primary hypertension  -     lisinopril (PRINIVIL;ZESTRIL) 10 MG tablet; take 1 tablet by mouth once daily, Disp-90 tablet, R-1Normal      Reviewed age and gender appropriate health screening exams and vaccinations. Advised patient regarding importance of keeping up with recommended health maintenance and to schedule as soon as possible if overdue, as this is important in assessing for undiagnosed pathology, especially cancer. Patient verbalizes understanding and agrees. Call or go to ED immediately if symptoms worsen or persist.  Return in about 2 months (around 1/8/2023). Sooner if necessary. Counseled regarding above diagnosis(es), including possible risks and complications, especially if left uncontrolled. Counseled regarding the possible side effects, risks, benefits and alternatives to treatment; patient and/or guardian verbalizes understanding. Advised patient to call with any new medication issues. All questions answered.     Marcela Deng MD  11/8/22

## 2024-02-14 NOTE — ASSESSMENT & PLAN NOTE
Patient found to have wheezing on physical exam.  Denies smoking history   Continue RT protocol   Trial symbicort.   Continue IV steroids  Outpatient PFTs.    supervision

## (undated) DEVICE — SYRINGE SAFETY 3 ML 18 GA X 1 1/2 BLUNT LL (100/BX 8BX/CA)

## (undated) DEVICE — SET EXTENSION WITH 2 PORTS (48EA/CA) ***PART #2C8610 IS A SUBSTITUTE*****

## (undated) DEVICE — CANNULA W/ SUPPLY TUBING O2 - (50/CA)

## (undated) DEVICE — LACTATED RINGERS INJ 1000 ML - (14EA/CA 60CA/PF)

## (undated) DEVICE — CANISTER SUCTION RIGID RED 1500CC (40EA/CA)

## (undated) DEVICE — SODIUM CHL. INJ. 0.9% 500ML (24EA/CA 50CA/PF)

## (undated) DEVICE — BANDAID SHEER STRIP 3/4 IN (100EA/BX 12BX/CA)

## (undated) DEVICE — ELECTRODE 850 FOAM ADHESIVE - HYDROGEL RADIOTRNSPRNT (50/PK)

## (undated) DEVICE — GLOVE, LITE (PAIR)

## (undated) DEVICE — TUBE SUCTION YANKAUER  1/4 X 6FT (20EA/CA)"

## (undated) DEVICE — SYRINGE 3 CC 22 GA X 1-1/2 - NDL SAFETY (50/BX 8BX/CA)

## (undated) DEVICE — SENSOR SPO2 ADULT LNCS ADTX (20/BX) ORDER ITEM #19593

## (undated) DEVICE — SYRINGE 6 CC 20 GA X 1 1/2 - NDL SAFETY  (50/BX)

## (undated) DEVICE — CON SEDATION EA ADDL 15 MIN

## (undated) DEVICE — SPONGE GAUZE NON-STERILE 4X4 - (2000/CA 10PK/CA)

## (undated) DEVICE — DEVICE HEMOSTATIC CLIPPING RESOLUTION 360 DEGREES (20EA/BX)

## (undated) DEVICE — TUBE CONNECTING SUCTION - CLEAR PLASTIC STERILE 72 IN (50EA/CA)

## (undated) DEVICE — TUBING CLEARLINK DUO-VENT - C-FLO (48EA/CA)

## (undated) DEVICE — SOD. CHL 10CC SYRINGE PREFILL - W/10 CC (30/BX)

## (undated) DEVICE — GOWN SURGEONS LARGE - (32/CA)

## (undated) DEVICE — KIT  I.V. START (100EA/CA)

## (undated) DEVICE — BASIN EMESIS DISP. - (250/CA)

## (undated) DEVICE — BITE BLOCK ADULT 60FR (100EA/CA)

## (undated) DEVICE — SYRINGE SAFETY 10 ML 18 GA X 1 1/2 BLUNT LL (100/BX 4BX/CA)

## (undated) DEVICE — CATHETER IV SAFETY 20 GA X 1-1/4 (50/BX)

## (undated) DEVICE — SYRINGE DISP. 50CC LS - (40/BX)

## (undated) DEVICE — CON SEDATION/>5 YR 1ST 15 MIN

## (undated) DEVICE — SYRINGE SAFETY 5 ML 18 GA X 1-1/2 BLUNT LL (100/BX 4BX/CA)

## (undated) DEVICE — NEPTUNE 4 PORT MANIFOLD - (20/PK)